# Patient Record
Sex: MALE | Race: BLACK OR AFRICAN AMERICAN | NOT HISPANIC OR LATINO | Employment: OTHER | URBAN - METROPOLITAN AREA
[De-identification: names, ages, dates, MRNs, and addresses within clinical notes are randomized per-mention and may not be internally consistent; named-entity substitution may affect disease eponyms.]

---

## 2018-05-22 ENCOUNTER — CLINICAL SUPPORT (OUTPATIENT)
Dept: SMOKING CESSATION | Facility: CLINIC | Age: 63
End: 2018-05-22
Payer: COMMERCIAL

## 2018-05-22 DIAGNOSIS — F17.200 NICOTINE DEPENDENCE: Primary | ICD-10-CM

## 2018-05-22 PROCEDURE — 99404 PREV MED CNSL INDIV APPRX 60: CPT | Mod: S$GLB,,, | Performed by: FAMILY MEDICINE

## 2018-05-22 RX ORDER — NICOTINE 7MG/24HR
1 PATCH, TRANSDERMAL 24 HOURS TRANSDERMAL DAILY
Qty: 28 PATCH | Refills: 0 | Status: SHIPPED | OUTPATIENT
Start: 2018-05-22 | End: 2018-05-28 | Stop reason: SDUPTHER

## 2018-05-22 NOTE — Clinical Note
Pt seen at intake today. He currently smokes 4 cigs/day, down from a pack a day. Discussed tobacco cessation medication of 7 mg nicotine patch QD. Pt started on rate reduction and wait time of 15 min prior to smoking. Exhaled carbon monoxide level was 9 (0-6 non-smoker). Will see pt back in office in 2 wks.

## 2018-05-28 DIAGNOSIS — F17.200 NICOTINE DEPENDENCE: ICD-10-CM

## 2018-05-28 RX ORDER — NICOTINE 7MG/24HR
1 PATCH, TRANSDERMAL 24 HOURS TRANSDERMAL DAILY
Qty: 28 PATCH | Refills: 0 | Status: SHIPPED | OUTPATIENT
Start: 2018-05-28 | End: 2018-06-05 | Stop reason: CLARIF

## 2018-05-28 NOTE — PROGRESS NOTES
Pt called regarding nicotine patches. Pharmacy states they never received prescription. Epic shows they did. Resent it today.

## 2018-06-05 ENCOUNTER — CLINICAL SUPPORT (OUTPATIENT)
Dept: SMOKING CESSATION | Facility: CLINIC | Age: 63
End: 2018-06-05
Payer: COMMERCIAL

## 2018-06-05 DIAGNOSIS — F17.200 NICOTINE DEPENDENCE: Primary | ICD-10-CM

## 2018-06-05 PROCEDURE — 99402 PREV MED CNSL INDIV APPRX 30: CPT | Mod: S$GLB,,, | Performed by: FAMILY MEDICINE

## 2018-06-05 RX ORDER — DM/P-EPHED/ACETAMINOPH/DOXYLAM 30-7.5/3
2 LIQUID (ML) ORAL
Qty: 81 LOZENGE | Refills: 0 | Status: SHIPPED | OUTPATIENT
Start: 2018-06-05 | End: 2018-07-05

## 2018-06-05 RX ORDER — NICOTINE 7MG/24HR
1 PATCH, TRANSDERMAL 24 HOURS TRANSDERMAL DAILY
Qty: 28 PATCH | Refills: 0 | Status: SHIPPED | OUTPATIENT
Start: 2018-06-05 | End: 2018-07-10 | Stop reason: SDUPTHER

## 2018-06-05 NOTE — Clinical Note
Pt seen in office today. He continues to smoke 4 cigs/day. Pt remains on tobacco cessation medication of 7 mg nicotine patch QD and 2 mg nicotine lozenge PRN (1-2 per hour in place of cigarettes). He has only been using cinnamon tooth picks. Pharmacy has patches and lozenges on back order. Cx'd and sent to another pharmacy. No adverse effects/mental changes noted at this time. Reviewed coping strategies/habitual behavior/relapse prevention with patient. Exhaled carbon monoxide level was 5 ppm per Smokerlyzer (0-6 non-smoker). Will see pt back in office in 1 wk.

## 2018-06-05 NOTE — PROGRESS NOTES
Individual Follow-Up Form    6/5/2018    Clinical Status of Patient: Outpatient    Length of Service: 30 minutes    Continuing Medication: yes  Patches or Nicotine Lozenges    Other Medications: none     Target Symptoms: Withdrawal and medication side effects. The following were rated moderate (3) to severe (4) on TCRS:  · Moderate (3): desire/crave tobaccp  · Severe (4): none    Comments:  Pt seen in office today. He continues to smoke 4 cigs/day. Pt remains on tobacco cessation medication of 7 mg nicotine patch QD and 2 mg nicotine lozenge PRN (1-2 per hour in place of cigarettes). He has only been using cinnamon tooth picks. Pharmacy has patches and lozenges on back order. Cx'd and sent to another pharmacy. No adverse effects/mental changes noted at this time. Reviewed coping strategies/habitual behavior/relapse prevention with patient. Exhaled carbon monoxide level was 5 ppm per Smokerlyzer (0-6 non-smoker). Will see pt back in office in 1 wk.     Diagnosis: F17.200    Next Visit: 1 week

## 2018-06-12 ENCOUNTER — CLINICAL SUPPORT (OUTPATIENT)
Dept: SMOKING CESSATION | Facility: CLINIC | Age: 63
End: 2018-06-12
Payer: COMMERCIAL

## 2018-06-12 DIAGNOSIS — F17.200 NICOTINE DEPENDENCE: ICD-10-CM

## 2018-06-12 PROCEDURE — 99402 PREV MED CNSL INDIV APPRX 30: CPT | Mod: S$GLB,,, | Performed by: FAMILY MEDICINE

## 2018-06-12 NOTE — Clinical Note
Pt seen in office today. He continues to smoke 3-4 cigs/day. Pt remains on tobacco cessation medication of 7 mg nicotine patch QD and 2 mg nicotine lozenge PRN (1-2 per hour in place of cigarettes). He has not been using lozenges due to disagreeable taste and hiccups. Explained that hiccups are due to too much nicotine. Advised to stop lozenges and use hard candy of his liking. No other adverse effects/mental changes noted at this time. Pt asked to attempt to make this his last pack. Reviewed coping strategies/habitual behavior/relapse prevention with patient. Exhaled carbon monoxide level was 5 ppm per Smokerlyzer (0-6 non-smoker). Will see pt back in office in 1 wk.

## 2018-06-12 NOTE — PROGRESS NOTES
Individual Follow-Up Form    6/12/2018    Clinical Status of Patient: Outpatient    Length of Service: 30 minutes    Continuing Medication: yes  Patches or Nicotine Lozenges    Other Medications: none     Target Symptoms: Withdrawal and medication side effects. The following were rated moderate (3) to severe (4) on TCRS:  · Moderate (3): desire/crave tobacco, hiccups  · Severe (4): none    Comments:  Pt seen in office today. He continues to smoke 3-4 cigs/day. Pt remains on tobacco cessation medication of 7 mg nicotine patch QD and 2 mg nicotine lozenge PRN (1-2 per hour in place of cigarettes). He has not been using lozenges due to disagreeable taste and hiccups. Explained that hiccups are due to too much nicotine. Advised to stop lozenges and use hard candy of his liking. No other adverse effects/mental changes noted at this time. Pt asked to attempt to make this his last pack. Reviewed coping strategies/habitual behavior/relapse prevention with patient. Exhaled carbon monoxide level was 5 ppm per Smokerlyzer (0-6 non-smoker). Will see pt back in office in 1 wk.     Diagnosis: F17.200    Next Visit: 1 week

## 2018-06-19 ENCOUNTER — CLINICAL SUPPORT (OUTPATIENT)
Dept: SMOKING CESSATION | Facility: CLINIC | Age: 63
End: 2018-06-19
Payer: COMMERCIAL

## 2018-06-19 DIAGNOSIS — F17.200 NICOTINE DEPENDENCE: ICD-10-CM

## 2018-06-19 PROCEDURE — 99402 PREV MED CNSL INDIV APPRX 30: CPT | Mod: S$GLB,,, | Performed by: FAMILY MEDICINE

## 2018-06-19 NOTE — PROGRESS NOTES
Individual Follow-Up Form    6/19/2018    Clinical Status of Patient: Outpatient    Length of Service: 30 minutes    Continuing Medication: yes  Patches or Nicotine Lozenges    Other Medications: none     Target Symptoms: Withdrawal and medication side effects. The following were rated moderate (3) to severe (4) on TCRS:  · Moderate (3): none  · Severe (4): none    Comments:  Pt seen in office today. He continues to smoke 3 cigs/day. Pt remains on tobacco cessation medication of 7 mg nicotine patch QD and 2 mg nicotine lozenge PRN (1-2 per hour in place of cigarettes). No adverse effects/mental changes noted at this time. Pt asked to make this his last pack and attempt a quit prior to next visit. Reviewed coping strategies/habitual behavior/relapse prevention with patient. Exhaled carbon monoxide level was 7 ppm per Smokerlyzer (0-6 non-smoker). Will see pt back in office in 1 wk.     Diagnosis: F17.200    Next Visit: 1 week

## 2018-06-19 NOTE — Clinical Note
Pt seen in office today. He continues to smoke 3 cigs/day. Pt remains on tobacco cessation medication of 7 mg nicotine patch QD and 2 mg nicotine lozenge PRN (1-2 per hour in place of cigarettes). No adverse effects/mental changes noted at this time. Pt asked to make this his last pack and attempt a quit prior to next visit. Reviewed coping strategies/habitual behavior/relapse prevention with patient. Exhaled carbon monoxide level was 7 ppm per Smokerlyzer (0-6 non-smoker). Will see pt back in office in 1 wk.

## 2018-06-26 ENCOUNTER — CLINICAL SUPPORT (OUTPATIENT)
Dept: SMOKING CESSATION | Facility: CLINIC | Age: 63
End: 2018-06-26
Payer: COMMERCIAL

## 2018-06-26 DIAGNOSIS — F17.200 NICOTINE DEPENDENCE: ICD-10-CM

## 2018-06-26 PROCEDURE — 99411 PREVENTIVE COUNSELING GROUP: CPT | Mod: S$GLB,,, | Performed by: FAMILY MEDICINE

## 2018-06-26 NOTE — PROGRESS NOTES
Individual Follow-Up Form    6/26/2018    Clinical Status of Patient: Outpatient    Length of Service: 30 minutes    Continuing Medication: yes  Patches or Nicotine Lozenges    Other Medications: none     Target Symptoms: Withdrawal and medication side effects. The following were rated moderate (3) to severe (4) on TCRS:  · Moderate (3): none  · Severe (4): none    Comments:  Pt seen in office today. He continues to smoke 2-3 cigs/day. Pt remains on tobacco cessation medication of 7 mg nicotine patch QD. No adverse effects/mental changes noted at this time. Pt asked to make this his last pack and attempt a quit. Reviewed coping strategies/habitual behavior/relapse prevention with patient. Exhaled carbon monoxide level was 5 ppm per Smokerlyzer (0-6 non-smoker). Will see pt back in office in 1 wk.     Diagnosis: F17.200    Next Visit: 1 week

## 2018-06-26 NOTE — Clinical Note
Pt seen in office today. He continues to smoke 2-3 cigs/day. Pt remains on tobacco cessation medication of 7 mg nicotine patch QD. No adverse effects/mental changes noted at this time. Pt asked to make this his last pack and attempt a quit. Reviewed coping strategies/habitual behavior/relapse prevention with patient. Exhaled carbon monoxide level was 5 ppm per Smokerlyzer (0-6 non-smoker). Will see pt back in office in 1 wk.

## 2018-07-03 ENCOUNTER — CLINICAL SUPPORT (OUTPATIENT)
Dept: SMOKING CESSATION | Facility: CLINIC | Age: 63
End: 2018-07-03
Payer: COMMERCIAL

## 2018-07-03 DIAGNOSIS — F17.200 NICOTINE DEPENDENCE: ICD-10-CM

## 2018-07-03 PROCEDURE — 99402 PREV MED CNSL INDIV APPRX 30: CPT | Mod: S$GLB,,, | Performed by: FAMILY MEDICINE

## 2018-07-03 NOTE — PROGRESS NOTES
Individual Follow-Up Form    7/3/2018    Clinical Status of Patient: Outpatient    Length of Service: 30 minutes    Continuing Medication: yes  Patches    Other Medications: none     Target Symptoms: Withdrawal and medication side effects. The following were rated moderate (3) to severe (4) on TCRS:  · Moderate (3): none  · Severe (4): none    Comments:  Pt seen in office today. He is tobacco free at this time. He did admit to a slip yesterday, but has remained tobacco free.  Pt remains on tobacco cessation medication of 7 mg nicotine patch QD. No adverse effects/mental changes noted at this time. Congratulated him on his success and encouraged him to keep up the great work.  Reviewed coping strategies/habitual behavior/relapse prevention with patient. Exhaled carbon monoxide level was 3 ppm per Smokerlyzer (0-6 non-smoker). Will see pt back in office in 1 wk.     Diagnosis: F17.200    Next Visit: 1 week

## 2018-07-03 NOTE — Clinical Note
Pt seen in office today. He is tobacco free at this time. He did admit to a slip yesterday, but has remained tobacco free.  Pt remains on tobacco cessation medication of 7 mg nicotine patch QD. No adverse effects/mental changes noted at this time. Congratulated him on his success and encouraged him to keep up the great work.  Reviewed coping strategies/habitual behavior/relapse prevention with patient. Exhaled carbon monoxide level was 3 ppm per Smokerlyzer (0-6 non-smoker). Will see pt back in office in 1 wk.

## 2018-07-10 ENCOUNTER — CLINICAL SUPPORT (OUTPATIENT)
Dept: SMOKING CESSATION | Facility: CLINIC | Age: 63
End: 2018-07-10
Payer: COMMERCIAL

## 2018-07-10 DIAGNOSIS — F17.200 NICOTINE DEPENDENCE: ICD-10-CM

## 2018-07-10 PROCEDURE — 99402 PREV MED CNSL INDIV APPRX 30: CPT | Mod: S$GLB,,, | Performed by: FAMILY MEDICINE

## 2018-07-10 RX ORDER — NICOTINE 7MG/24HR
1 PATCH, TRANSDERMAL 24 HOURS TRANSDERMAL DAILY
Qty: 28 PATCH | Refills: 0 | Status: SHIPPED | OUTPATIENT
Start: 2018-07-10 | End: 2018-08-09

## 2018-07-10 NOTE — Clinical Note
Pt seen in office today. He is tobacco free at this time, but he admits to a slip yesterday. Pt remains on tobacco cessation medication of 7 mg nicotine patch QD. No adverse effects/mental changes noted at this time. Congratulated him on his success and encouraged him to keep up the great work. Reviewed coping strategies/habitual behavior/relapse prevention with patient. Exhaled carbon monoxide level was 5 ppm per Smokerlyzer (0-6 non-smoker). Will see pt back in office PRN. He has completed all assigned visits.

## 2018-07-10 NOTE — PROGRESS NOTES
Individual Follow-Up Form    7/10/2018    Clinical Status of Patient: Outpatient    Length of Service: 30 minutes    Continuing Medication: yes  Patches    Other Medications: none     Target Symptoms: Withdrawal and medication side effects. The following were rated moderate (3) to severe (4) on TCRS:  · Moderate (3): none  · Severe (4): none    Comments:  Pt seen in office today. He is tobacco free at this time, but he admits to a slip yesterday. Pt remains on tobacco cessation medication of 7 mg nicotine patch QD. No adverse effects/mental changes noted at this time. Congratulated him on his success and encouraged him to keep up the great work. Reviewed coping strategies/habitual behavior/relapse prevention with patient. Exhaled carbon monoxide level was 5 ppm per Smokerlyzer (0-6 non-smoker). Will see pt back in office PRN. He has completed all assigned visits.     Diagnosis: F17.200    Next Visit: PRN

## 2018-08-03 ENCOUNTER — TELEPHONE (OUTPATIENT)
Dept: SMOKING CESSATION | Facility: CLINIC | Age: 63
End: 2018-08-03

## 2018-08-06 ENCOUNTER — CLINICAL SUPPORT (OUTPATIENT)
Dept: SMOKING CESSATION | Facility: CLINIC | Age: 63
End: 2018-08-06
Payer: COMMERCIAL

## 2018-08-06 DIAGNOSIS — F17.200 NICOTINE DEPENDENCE: Primary | ICD-10-CM

## 2018-08-06 PROCEDURE — 99407 BEHAV CHNG SMOKING > 10 MIN: CPT | Mod: S$GLB,,,

## 2018-08-06 NOTE — PROGRESS NOTES
Called pt to f/u on his 3 month smoking cessation quit status. Pt stated he remains tobacco free. Congratulated him and informed him of 6 and 12 month phone f/up's.

## 2018-11-28 ENCOUNTER — TELEPHONE (OUTPATIENT)
Dept: SMOKING CESSATION | Facility: CLINIC | Age: 63
End: 2018-11-28

## 2018-11-30 ENCOUNTER — CLINICAL SUPPORT (OUTPATIENT)
Dept: SMOKING CESSATION | Facility: CLINIC | Age: 63
End: 2018-11-30
Payer: COMMERCIAL

## 2018-11-30 DIAGNOSIS — F17.200 NICOTINE DEPENDENCE: Primary | ICD-10-CM

## 2018-11-30 PROCEDURE — 99407 BEHAV CHNG SMOKING > 10 MIN: CPT | Mod: S$GLB,,,

## 2019-05-28 ENCOUNTER — TELEPHONE (OUTPATIENT)
Dept: SMOKING CESSATION | Facility: CLINIC | Age: 64
End: 2019-05-28

## 2019-06-04 ENCOUNTER — TELEPHONE (OUTPATIENT)
Dept: SMOKING CESSATION | Facility: CLINIC | Age: 64
End: 2019-06-04

## 2019-06-25 ENCOUNTER — TELEPHONE (OUTPATIENT)
Dept: SMOKING CESSATION | Facility: CLINIC | Age: 64
End: 2019-06-25

## 2020-01-23 ENCOUNTER — OFFICE VISIT (OUTPATIENT)
Dept: PRIMARY CARE CLINIC | Facility: CLINIC | Age: 65
End: 2020-01-23
Payer: COMMERCIAL

## 2020-01-23 VITALS
BODY MASS INDEX: 33.7 KG/M2 | WEIGHT: 209.69 LBS | TEMPERATURE: 98 F | SYSTOLIC BLOOD PRESSURE: 122 MMHG | RESPIRATION RATE: 18 BRPM | HEART RATE: 89 BPM | HEIGHT: 66 IN | DIASTOLIC BLOOD PRESSURE: 84 MMHG | OXYGEN SATURATION: 95 %

## 2020-01-23 DIAGNOSIS — Z72.0 TOBACCO ABUSE: ICD-10-CM

## 2020-01-23 DIAGNOSIS — E78.5 HYPERLIPIDEMIA, UNSPECIFIED HYPERLIPIDEMIA TYPE: ICD-10-CM

## 2020-01-23 DIAGNOSIS — I10 ESSENTIAL HYPERTENSION: Primary | ICD-10-CM

## 2020-01-23 DIAGNOSIS — N18.30 CKD (CHRONIC KIDNEY DISEASE) STAGE 3, GFR 30-59 ML/MIN: ICD-10-CM

## 2020-01-23 DIAGNOSIS — Z87.01 HISTORY OF BACTERIAL PNEUMONIA: ICD-10-CM

## 2020-01-23 PROCEDURE — 99203 PR OFFICE/OUTPT VISIT, NEW, LEVL III, 30-44 MIN: ICD-10-PCS | Mod: S$GLB,,, | Performed by: FAMILY MEDICINE

## 2020-01-23 PROCEDURE — 3008F BODY MASS INDEX DOCD: CPT | Mod: CPTII,S$GLB,, | Performed by: FAMILY MEDICINE

## 2020-01-23 PROCEDURE — 99999 PR PBB SHADOW E&M-EST. PATIENT-LVL III: ICD-10-PCS | Mod: PBBFAC,,, | Performed by: FAMILY MEDICINE

## 2020-01-23 PROCEDURE — 3074F PR MOST RECENT SYSTOLIC BLOOD PRESSURE < 130 MM HG: ICD-10-PCS | Mod: CPTII,S$GLB,, | Performed by: FAMILY MEDICINE

## 2020-01-23 PROCEDURE — 3008F PR BODY MASS INDEX (BMI) DOCUMENTED: ICD-10-PCS | Mod: CPTII,S$GLB,, | Performed by: FAMILY MEDICINE

## 2020-01-23 PROCEDURE — 3079F DIAST BP 80-89 MM HG: CPT | Mod: CPTII,S$GLB,, | Performed by: FAMILY MEDICINE

## 2020-01-23 PROCEDURE — 3079F PR MOST RECENT DIASTOLIC BLOOD PRESSURE 80-89 MM HG: ICD-10-PCS | Mod: CPTII,S$GLB,, | Performed by: FAMILY MEDICINE

## 2020-01-23 PROCEDURE — 3074F SYST BP LT 130 MM HG: CPT | Mod: CPTII,S$GLB,, | Performed by: FAMILY MEDICINE

## 2020-01-23 PROCEDURE — 99203 OFFICE O/P NEW LOW 30 MIN: CPT | Mod: S$GLB,,, | Performed by: FAMILY MEDICINE

## 2020-01-23 PROCEDURE — 99999 PR PBB SHADOW E&M-EST. PATIENT-LVL III: CPT | Mod: PBBFAC,,, | Performed by: FAMILY MEDICINE

## 2020-01-23 RX ORDER — AMLODIPINE BESYLATE 10 MG/1
10 TABLET ORAL DAILY
COMMUNITY
Start: 2019-12-27 | End: 2020-06-02 | Stop reason: SDUPTHER

## 2020-01-23 RX ORDER — FUROSEMIDE 20 MG/1
20 TABLET ORAL DAILY
COMMUNITY
End: 2020-06-02 | Stop reason: SDUPTHER

## 2020-01-23 NOTE — PROGRESS NOTES
"  Subjective:       Patient ID: Gurwinder Hair is a 65 y.o. male.    Chief Complaint: Establish Care (Patient here to establish care with new PCP. Needs forms filled out for school. ); Insomnia (Patient waking up during the night.); Mass (Patient reports "lump" to left side of scalp. Nontender. ); and Shoulder Pain (Patient c/o bilateral shoulder andres, worse at night. )    HPI: 64 yo BM-- patient is a new patient needs new PCP has problems with some     History of hypertension patient has arm blood pressure cuff--BP at home 120/79 blood pressure here today 122/84     History of pneumonia 2 years ago--got congested few weeks ago--, no fever, +occasionally at night runny stuffy nose, no sore throat, +cough, +phlegm-green  No asthma TB-+smoking 1 pack per day.  Patient did participate in no smoking program but was ineffective afraid of Chantix due to side effects.    ROS:  Skin: no psoriasis, eczema, skin cancer  HEENT: No headache, ocular pain, blurred vision, diplopia, epistaxis, hoarseness change in voice, thyroid trouble  Lung:  + hx pneumonia, no asthma, Tb, wheezing, SOB, +smoking 1 pack per day 2 years ago after the ammonia was on oxygen for while  Heart: No chest pain, ankle edema, palpitations, MI, +merary murmur, +hypertension,+ hx  Hyperlipidemia---no stent bypass arrhythmia  Abdomen: No nausea, vomiting, diarrhea, constipation, ulcers, hepatitis, gallbladder disease, melena, hematochezia, hematemesis  : no UTI, renal disease, stones, prostate 2 yrs ago kidneys not functioning well after the pneumonia  MS: no fractures, O/A, lupus, rheumatoid, gout fracture left 3rd 4th and 5th digits, right wrist   Neuro: No dizziness, LOC, seizures   No diabetes, no anemia, no anxiety, no depression  --6 children, retired used work [employer] , Lives with wife and 2 children     Objective:   Physical Exam:  General: Well nourished, well developed, no acute distress  Skin: No lesions  HEENT: Eyes " PERRLA, EOM intact, nose patent, throat non-erythematous   NECK: Supple, no bruits, No JVD, no nodes  Lungs: Clear, no rales, rhonchi, wheezing  Heart: Regular rate and rhythm, no murmurs, gallops, or rubs  Abdomen: flat, bowel sounds positive, no tenderness, or organomegaly  MS: Range of motion and muscle strength intact  Neuro: Alert, CN intact, oriented X 3  Extremities: No cyanosis, clubbing, or edema         Assessment:       1. Essential hypertension    2. Hyperlipidemia, unspecified hyperlipidemia type    3. CKD (chronic kidney disease) stage 3, GFR 30-59 ml/min    4. Tobacco abuse    5. History of bacterial pneumonia        Plan:       Essential hypertension  -     CBC auto differential; Future; Expected date: 01/23/2020  -     Comprehensive metabolic panel; Future; Expected date: 01/23/2020  -     EKG 12-lead; Future  -     Fecal Immunochemical Test (iFOBT); Future; Expected date: 01/23/2020  -     X-Ray Chest PA And Lateral; Future; Expected date: 01/23/2020  -     POCT urine dipstick without microscope  -     T4, free; Future; Expected date: 01/23/2020  -     TSH; Future; Expected date: 01/23/2020    Hyperlipidemia, unspecified hyperlipidemia type  -     Lipid panel; Future; Expected date: 01/23/2020    CKD (chronic kidney disease) stage 3, GFR 30-59 ml/min    Tobacco abuse    History of bacterial pneumonia        Patient in mainly for form to be filled out for [employer] in [employer]--normal exam no contagious disease no physical or mental restrictions smokes 1 pack per day no alcohol hypertension but well controlled history of pneumonia 2 years ago no residual sequelae excellent candidate for [employer]  Hypertension-blood pressure 122/84 has arm blood pressure cuff checking blood pressure at home on Cozaar 100 hydrochlorothiazide 25 and Norvasc 10--if lab reveals patient has chronic renal insufficiency will have to be off of Cozaar  Hyperlipidemia--on Lipitor 20  Low-sodium  low-fat diet--exercise as tolerated--try to get ideal body weight  Tobacco abuse-1 pack per day-tried Nicoderm gum tried Chantix--told needs to stop smoking  Physical exam last in computer 2016-needs CBCs CMP lipids T4 TSH stool guaiac UA chest x-ray EKG is physical  Health maintenance hepatitis C lipids HIV tetanus  Six month supply of all medications

## 2020-02-05 ENCOUNTER — TELEPHONE (OUTPATIENT)
Dept: PRIMARY CARE CLINIC | Facility: CLINIC | Age: 65
End: 2020-02-05

## 2020-02-05 DIAGNOSIS — R91.1 PULMONARY NODULE: Primary | ICD-10-CM

## 2020-02-05 NOTE — TELEPHONE ENCOUNTER
----- Message from Sina Ball MD sent at 1/25/2020 10:32 PM CST -----  Chest Xray stable right lower lobe nodule, EKG inferior infarct age undetermined -- lab Cr 1.6 GFR 44-51 HDL 33 can increase with exercise See if pt has cardiologist if so needs see him with EKG unless ptnhad recent cardiac workup [place] chest Xray be sure nodule stable if not will need CT scan chest

## 2020-02-10 ENCOUNTER — TELEPHONE (OUTPATIENT)
Dept: PRIMARY CARE CLINIC | Facility: CLINIC | Age: 65
End: 2020-02-10

## 2020-02-10 DIAGNOSIS — R94.31 ABNORMAL EKG: Primary | ICD-10-CM

## 2020-02-10 NOTE — TELEPHONE ENCOUNTER
----- Message from Diana Alonso sent at 2/10/2020 12:38 PM CST -----  Contact: Patient  Type:  Patient Returning Call    Who Called:  Gurwinder, patient  Who Left Message for Patient:  Deepti  Does the patient know what this is regarding?:  Xray results  Best Call Back Number:  [phone]  Additional Information:  Missed your call, please call him back. Thanks.

## 2020-02-10 NOTE — TELEPHONE ENCOUNTER
Spoke with patient, lab, EKG, and CXR given. Patient with abnormal EKG. Referral to cardiology pended. Please advise.

## 2020-02-10 NOTE — TELEPHONE ENCOUNTER
Spoke with patient in another telephone encounter, results given. Referral for cardiology pended to PCP.

## 2020-02-19 NOTE — TELEPHONE ENCOUNTER
----- Message from Liya Amaradhaval sent at 2/19/2020 11:36 AM CST -----  Contact: Patient [phone]  Requesting an RX refill or new RX.  Is this a refill or new RX:  refill  RX name and strength: losartan (COZAAR) 100 MG tablet  Directions (copy/paste from chart):    Is this a 30 day or 90 day RX:    Local pharmacy or mail order pharmacy:  local  Pharmacy name and phone # (copy/paste from chart):  [id number] - [place], [place] - [address] AT [facility] [address] [phone] (Phone)  [phone] (Fax)     Comments:

## 2020-02-25 RX ORDER — LOSARTAN POTASSIUM 100 MG/1
100 TABLET ORAL EVERY MORNING
Qty: 30 TABLET | Refills: 5 | Status: SHIPPED | OUTPATIENT
Start: 2020-02-25 | End: 2020-06-02 | Stop reason: SDUPTHER

## 2020-06-02 ENCOUNTER — OFFICE VISIT (OUTPATIENT)
Dept: PRIMARY CARE CLINIC | Facility: CLINIC | Age: 65
End: 2020-06-02
Payer: COMMERCIAL

## 2020-06-02 VITALS
HEIGHT: 66 IN | DIASTOLIC BLOOD PRESSURE: 86 MMHG | OXYGEN SATURATION: 96 % | SYSTOLIC BLOOD PRESSURE: 122 MMHG | WEIGHT: 212.88 LBS | TEMPERATURE: 99 F | RESPIRATION RATE: 18 BRPM | HEART RATE: 89 BPM | BODY MASS INDEX: 34.21 KG/M2

## 2020-06-02 DIAGNOSIS — I10 ESSENTIAL HYPERTENSION: Primary | ICD-10-CM

## 2020-06-02 DIAGNOSIS — Z72.0 TOBACCO ABUSE: ICD-10-CM

## 2020-06-02 DIAGNOSIS — E78.5 HYPERLIPIDEMIA, UNSPECIFIED HYPERLIPIDEMIA TYPE: ICD-10-CM

## 2020-06-02 DIAGNOSIS — N18.30 CKD (CHRONIC KIDNEY DISEASE) STAGE 3, GFR 30-59 ML/MIN: ICD-10-CM

## 2020-06-02 PROCEDURE — 99999 PR PBB SHADOW E&M-EST. PATIENT-LVL III: ICD-10-PCS | Mod: PBBFAC,,, | Performed by: FAMILY MEDICINE

## 2020-06-02 PROCEDURE — 99999 PR PBB SHADOW E&M-EST. PATIENT-LVL III: CPT | Mod: PBBFAC,,, | Performed by: FAMILY MEDICINE

## 2020-06-02 PROCEDURE — 3079F DIAST BP 80-89 MM HG: CPT | Mod: CPTII,S$GLB,, | Performed by: FAMILY MEDICINE

## 2020-06-02 PROCEDURE — 99213 OFFICE O/P EST LOW 20 MIN: CPT | Mod: S$GLB,,, | Performed by: FAMILY MEDICINE

## 2020-06-02 PROCEDURE — 3079F PR MOST RECENT DIASTOLIC BLOOD PRESSURE 80-89 MM HG: ICD-10-PCS | Mod: CPTII,S$GLB,, | Performed by: FAMILY MEDICINE

## 2020-06-02 PROCEDURE — 3008F PR BODY MASS INDEX (BMI) DOCUMENTED: ICD-10-PCS | Mod: CPTII,S$GLB,, | Performed by: FAMILY MEDICINE

## 2020-06-02 PROCEDURE — 3008F BODY MASS INDEX DOCD: CPT | Mod: CPTII,S$GLB,, | Performed by: FAMILY MEDICINE

## 2020-06-02 PROCEDURE — 3074F PR MOST RECENT SYSTOLIC BLOOD PRESSURE < 130 MM HG: ICD-10-PCS | Mod: CPTII,S$GLB,, | Performed by: FAMILY MEDICINE

## 2020-06-02 PROCEDURE — 3074F SYST BP LT 130 MM HG: CPT | Mod: CPTII,S$GLB,, | Performed by: FAMILY MEDICINE

## 2020-06-02 PROCEDURE — 99213 PR OFFICE/OUTPT VISIT, EST, LEVL III, 20-29 MIN: ICD-10-PCS | Mod: S$GLB,,, | Performed by: FAMILY MEDICINE

## 2020-06-02 RX ORDER — AMLODIPINE BESYLATE 10 MG/1
10 TABLET ORAL DAILY
Qty: 90 TABLET | Refills: 1 | Status: SHIPPED | OUTPATIENT
Start: 2020-06-02 | End: 2020-12-10 | Stop reason: SDUPTHER

## 2020-06-02 RX ORDER — FUROSEMIDE 20 MG/1
20 TABLET ORAL DAILY
Qty: 90 TABLET | Refills: 1 | Status: SHIPPED | OUTPATIENT
Start: 2020-06-02 | End: 2020-12-10 | Stop reason: SDUPTHER

## 2020-06-02 RX ORDER — LOSARTAN POTASSIUM 100 MG/1
100 TABLET ORAL EVERY MORNING
Qty: 90 TABLET | Refills: 1 | Status: SHIPPED | OUTPATIENT
Start: 2020-06-02 | End: 2021-03-03 | Stop reason: SDUPTHER

## 2020-06-02 RX ORDER — ATORVASTATIN CALCIUM 20 MG/1
TABLET, FILM COATED ORAL
Qty: 90 TABLET | Refills: 1 | Status: SHIPPED | OUTPATIENT
Start: 2020-06-02 | End: 2021-02-12 | Stop reason: SDUPTHER

## 2020-06-02 NOTE — PROGRESS NOTES
Subjective:       Patient ID: Gurwinder Hair is a 65 y.o. male.    Chief Complaint: Medication Refill    HPI: 66 yo BM-- patient needs medication refills---eating well---+BM---ambulating well.  Patient has arm blood pressure cuff--doing well at home today 122/86    ROS:  Skin: no psoriasis, eczema, skin cancer  HEENT: No headache, ocular pain, blurred vision, diplopia, epistaxis, hoarseness change in voice, thyroid trouble  Lung:  + hx pneumonia, no asthma, Tb, wheezing, SOB, +smoking 1 pack per day 2 years ago after the ammonia was on oxygen for while  Heart: No chest pain, ankle edema, palpitations, MI, +merary murmur, +hypertension,+ hx  Hyperlipidemia---no stent bypass arrhythmia  Abdomen: No nausea, vomiting, diarrhea, constipation, ulcers, hepatitis, gallbladder disease, melena, hematochezia, hematemesis  : no UTI, renal disease, stones, prostate   MS: no fractures, O/A, lupus, rheumatoid, gout fracture left 3rd 4th and 5th digits, right wrist   Neuro: No dizziness, LOC, seizures   No diabetes, no anemia, no anxiety, no depression  --6 children, retired used work [employer] , Lives with wife and 2 children     Objective:   Physical Exam:  General: Well nourished, well developed, no acute distress  Skin: No lesions  HEENT: Eyes PERRLA, EOM intact, nose patent, throat non-erythematous   NECK: Supple, no bruits, No JVD, no nodes  Lungs: Clear, no rales, rhonchi, wheezing  Heart: Regular rate and rhythm, no murmurs, gallops, or rubs  Abdomen: flat, bowel sounds positive, no tenderness, or organomegaly  MS: Range of motion and muscle strength intact  Neuro: Alert, CN intact, oriented X 3  Extremities: No cyanosis, clubbing, or edema         Assessment:       1. Essential hypertension    2. Hyperlipidemia, unspecified hyperlipidemia type    3. CKD (chronic kidney disease) stage 3, GFR 30-59 ml/min    4. Tobacco abuse        Plan:       Essential hypertension    Hyperlipidemia, unspecified  hyperlipidemia type    CKD (chronic kidney disease) stage 3, GFR 30-59 ml/min    Tobacco abuse    Other orders  -     amLODIPine (NORVASC) 10 MG tablet; Take 1 tablet (10 mg total) by mouth once daily.  Dispense: 90 tablet; Refill: 1  -     atorvastatin (LIPITOR) 20 MG tablet; TAKE 1 TABLET (20 MG) BY ORAL ROUTE ONCE DAILY (PM)  Dispense: 90 tablet; Refill: 1  -     furosemide (LASIX) 20 MG tablet; Take 1 tablet (20 mg total) by mouth once daily.  Dispense: 90 tablet; Refill: 1  -     losartan (COZAAR) 100 MG tablet; Take 1 tablet (100 mg total) by mouth every morning.  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

## 2020-12-04 RX ORDER — FUROSEMIDE 20 MG/1
20 TABLET ORAL DAILY
Qty: 90 TABLET | Refills: 1 | Status: CANCELLED | OUTPATIENT
Start: 2020-12-04

## 2020-12-04 RX ORDER — AMLODIPINE BESYLATE 10 MG/1
10 TABLET ORAL DAILY
Qty: 90 TABLET | Refills: 1 | Status: CANCELLED | OUTPATIENT
Start: 2020-12-04

## 2020-12-04 NOTE — TELEPHONE ENCOUNTER
----- Message from Kinza Feliciano MA sent at 12/4/2020 11:03 AM CST -----  Contact: [phone]  Requesting an RX refill or new RX.  Is this a refill or new RX:   RX name and strength:furosemide (LASIX) 20 MG tablet  Is this a 30 day or 90 day RX: 30  Pharmacy name and phone # (copy/paste from chart):  [id number] [place] [facility] [place] - [address] AT [facility] [place] [place] [address] [phone] (Phone)  [phone] (Fax)  Comments:   Requesting an RX refill or new RX.  Is this a refill or new RX:   RX name and strength:amLODIPine (NORVASC) 10 MG tablet  Is this a 30 day or 90 day RX: 30  Pharmacy name and phone # (copy/paste from chart):  [facility] #[id number] - [place], [place] - [address] AT [facility] [place] [place] [address] [phone] (Phone)  [phone] (Fax)  Comments:

## 2020-12-10 NOTE — TELEPHONE ENCOUNTER
----- Message from Shira S Baljit sent at 12/10/2020  3:15 PM CST -----  Regarding: Pt self Mobile/Home [phone]  Requesting an RX refill or new RX.  Is this a refill or new RX: Refill  RX name and strength: furosemide (LASIX) 20 MG tablet  Is this a 30 day or 90 day RX: 90  Pharmacy name and phone # [facility] #[id number] - [place], [place]  [address] [facility] [address]  [phone] Fax# [phone]   Comments: Patient usually get a thirty day supply in these medications but he would like to get a ninety day supply please. Patient would like for you to give him a call when his script is filled please.           Requesting an RX refill or new RX.  Is this a refill or new RX: Refill  RX name and strength: amLODIPine (NORVASC) 10 MG tablet  Is this a 30 day or 90 day RX: 90  Pharmacy name and phone # [facility] #[id number] - [place], [place] - [address] [place] [facility] [address]  [phone] Fax# [phone]

## 2020-12-13 RX ORDER — FUROSEMIDE 20 MG/1
20 TABLET ORAL DAILY
Qty: 90 TABLET | Refills: 1 | Status: SHIPPED | OUTPATIENT
Start: 2020-12-13 | End: 2021-05-17 | Stop reason: SDUPTHER

## 2020-12-13 RX ORDER — AMLODIPINE BESYLATE 10 MG/1
10 TABLET ORAL DAILY
Qty: 90 TABLET | Refills: 1 | Status: SHIPPED | OUTPATIENT
Start: 2020-12-13 | End: 2021-05-17 | Stop reason: SDUPTHER

## 2020-12-14 NOTE — TELEPHONE ENCOUNTER
Call tell pt last seen may should have lab q 6 mo Last lab OK 3 mo refills but needs lab and be seen OK3 mo refills CBC CMP,lipid T4,TSh see me 2 mo later

## 2020-12-22 NOTE — TELEPHONE ENCOUNTER
Called patient notified that medications are refilled and needs labs before more refills states understanding

## 2021-02-12 ENCOUNTER — OFFICE VISIT (OUTPATIENT)
Dept: PRIMARY CARE CLINIC | Facility: CLINIC | Age: 66
End: 2021-02-12
Payer: MEDICARE

## 2021-02-12 VITALS
OXYGEN SATURATION: 98 % | BODY MASS INDEX: 34.29 KG/M2 | TEMPERATURE: 98 F | WEIGHT: 218.5 LBS | RESPIRATION RATE: 18 BRPM | DIASTOLIC BLOOD PRESSURE: 74 MMHG | SYSTOLIC BLOOD PRESSURE: 114 MMHG | HEART RATE: 84 BPM | HEIGHT: 67 IN

## 2021-02-12 DIAGNOSIS — Z11.4 ENCOUNTER FOR SCREENING FOR HIV: ICD-10-CM

## 2021-02-12 DIAGNOSIS — Z87.01 HISTORY OF BACTERIAL PNEUMONIA: ICD-10-CM

## 2021-02-12 DIAGNOSIS — I10 ESSENTIAL HYPERTENSION: Primary | ICD-10-CM

## 2021-02-12 DIAGNOSIS — Z72.0 TOBACCO ABUSE: ICD-10-CM

## 2021-02-12 DIAGNOSIS — E78.5 HYPERLIPIDEMIA, UNSPECIFIED HYPERLIPIDEMIA TYPE: ICD-10-CM

## 2021-02-12 DIAGNOSIS — Z11.59 NEED FOR HEPATITIS C SCREENING TEST: ICD-10-CM

## 2021-02-12 DIAGNOSIS — Z87.898 HISTORY OF SOLITARY PULMONARY NODULE: ICD-10-CM

## 2021-02-12 LAB
BILIRUB SERPL-MCNC: NORMAL MG/DL
BLOOD URINE, POC: NORMAL
CLARITY, POC UA: CLEAR
COLOR, POC UA: YELLOW
GLUCOSE UR QL STRIP: NORMAL
KETONES UR QL STRIP: NORMAL
LEUKOCYTE ESTERASE URINE, POC: NORMAL
NITRITE, POC UA: NORMAL
PH, POC UA: 5
PROTEIN, POC: NORMAL
SPECIFIC GRAVITY, POC UA: 1
UROBILINOGEN, POC UA: NORMAL

## 2021-02-12 PROCEDURE — 99499 UNLISTED E&M SERVICE: CPT | Mod: S$GLB,,, | Performed by: FAMILY MEDICINE

## 2021-02-12 PROCEDURE — 81002 URINALYSIS NONAUTO W/O SCOPE: CPT | Mod: S$GLB,,, | Performed by: FAMILY MEDICINE

## 2021-02-12 PROCEDURE — 99999 PR PBB SHADOW E&M-EST. PATIENT-LVL III: ICD-10-PCS | Mod: PBBFAC,,, | Performed by: FAMILY MEDICINE

## 2021-02-12 PROCEDURE — 99499 RISK ADDL DX/OHS AUDIT: ICD-10-PCS | Mod: S$GLB,,, | Performed by: FAMILY MEDICINE

## 2021-02-12 PROCEDURE — 1126F PR PAIN SEVERITY QUANTIFIED, NO PAIN PRESENT: ICD-10-PCS | Mod: S$GLB,,, | Performed by: FAMILY MEDICINE

## 2021-02-12 PROCEDURE — 1126F AMNT PAIN NOTED NONE PRSNT: CPT | Mod: S$GLB,,, | Performed by: FAMILY MEDICINE

## 2021-02-12 PROCEDURE — 81002 POCT URINE DIPSTICK WITHOUT MICROSCOPE: ICD-10-PCS | Mod: S$GLB,,, | Performed by: FAMILY MEDICINE

## 2021-02-12 PROCEDURE — 3008F PR BODY MASS INDEX (BMI) DOCUMENTED: ICD-10-PCS | Mod: CPTII,S$GLB,, | Performed by: FAMILY MEDICINE

## 2021-02-12 PROCEDURE — 99999 PR PBB SHADOW E&M-EST. PATIENT-LVL III: CPT | Mod: PBBFAC,,, | Performed by: FAMILY MEDICINE

## 2021-02-12 PROCEDURE — 3008F BODY MASS INDEX DOCD: CPT | Mod: CPTII,S$GLB,, | Performed by: FAMILY MEDICINE

## 2021-02-12 PROCEDURE — 99397 PR PREVENTIVE VISIT,EST,65 & OVER: ICD-10-PCS | Mod: 25,S$GLB,, | Performed by: FAMILY MEDICINE

## 2021-02-12 PROCEDURE — 99397 PER PM REEVAL EST PAT 65+ YR: CPT | Mod: 25,S$GLB,, | Performed by: FAMILY MEDICINE

## 2021-02-12 PROCEDURE — 3078F PR MOST RECENT DIASTOLIC BLOOD PRESSURE < 80 MM HG: ICD-10-PCS | Mod: CPTII,S$GLB,, | Performed by: FAMILY MEDICINE

## 2021-02-12 PROCEDURE — 3074F SYST BP LT 130 MM HG: CPT | Mod: CPTII,S$GLB,, | Performed by: FAMILY MEDICINE

## 2021-02-12 PROCEDURE — 3074F PR MOST RECENT SYSTOLIC BLOOD PRESSURE < 130 MM HG: ICD-10-PCS | Mod: CPTII,S$GLB,, | Performed by: FAMILY MEDICINE

## 2021-02-12 PROCEDURE — 3078F DIAST BP <80 MM HG: CPT | Mod: CPTII,S$GLB,, | Performed by: FAMILY MEDICINE

## 2021-02-12 RX ORDER — ATORVASTATIN CALCIUM 20 MG/1
TABLET, FILM COATED ORAL
Qty: 90 TABLET | Refills: 1 | Status: SHIPPED | OUTPATIENT
Start: 2021-02-12 | End: 2021-02-12 | Stop reason: SDUPTHER

## 2021-02-15 ENCOUNTER — TELEPHONE (OUTPATIENT)
Dept: PRIMARY CARE CLINIC | Facility: CLINIC | Age: 66
End: 2021-02-15

## 2021-02-16 RX ORDER — FENOFIBRIC ACID 45 MG/1
45 CAPSULE, DELAYED RELEASE ORAL DAILY
Qty: 90 CAPSULE | Refills: 3 | Status: SHIPPED | OUTPATIENT
Start: 2021-02-16 | End: 2021-08-12 | Stop reason: SDUPTHER

## 2021-02-19 ENCOUNTER — TELEPHONE (OUTPATIENT)
Dept: PRIMARY CARE CLINIC | Facility: CLINIC | Age: 66
End: 2021-02-19

## 2021-02-19 DIAGNOSIS — I71.40 ABDOMINAL AORTIC ANEURYSM (AAA) WITHOUT RUPTURE: Primary | ICD-10-CM

## 2021-02-22 ENCOUNTER — TELEPHONE (OUTPATIENT)
Dept: VASCULAR SURGERY | Facility: CLINIC | Age: 66
End: 2021-02-22

## 2021-02-22 DIAGNOSIS — I71.40 AAA (ABDOMINAL AORTIC ANEURYSM) WITHOUT RUPTURE: Primary | ICD-10-CM

## 2021-02-24 ENCOUNTER — INITIAL CONSULT (OUTPATIENT)
Dept: VASCULAR SURGERY | Facility: CLINIC | Age: 66
End: 2021-02-24
Payer: MEDICARE

## 2021-02-24 ENCOUNTER — HOSPITAL ENCOUNTER (OUTPATIENT)
Dept: VASCULAR SURGERY | Facility: CLINIC | Age: 66
Discharge: HOME | End: 2021-02-24
Attending: SURGERY
Payer: MEDICARE

## 2021-02-24 VITALS
HEART RATE: 77 BPM | BODY MASS INDEX: 33.66 KG/M2 | HEIGHT: 66 IN | SYSTOLIC BLOOD PRESSURE: 125 MMHG | WEIGHT: 209.44 LBS | DIASTOLIC BLOOD PRESSURE: 85 MMHG | TEMPERATURE: 99 F

## 2021-02-24 DIAGNOSIS — Z72.0 TOBACCO ABUSE: ICD-10-CM

## 2021-02-24 DIAGNOSIS — I71.40 ABDOMINAL AORTIC ANEURYSM (AAA) WITHOUT RUPTURE: Primary | ICD-10-CM

## 2021-02-24 DIAGNOSIS — I71.40 ABDOMINAL AORTIC ANEURYSM (AAA) WITHOUT RUPTURE: ICD-10-CM

## 2021-02-24 DIAGNOSIS — I71.40 AAA (ABDOMINAL AORTIC ANEURYSM) WITHOUT RUPTURE: ICD-10-CM

## 2021-02-24 PROCEDURE — 99407 BEHAV CHNG SMOKING > 10 MIN: CPT | Mod: S$GLB,,, | Performed by: SURGERY

## 2021-02-24 PROCEDURE — 99204 PR OFFICE/OUTPT VISIT, NEW, LEVL IV, 45-59 MIN: ICD-10-PCS | Mod: 25,S$GLB,, | Performed by: SURGERY

## 2021-02-24 PROCEDURE — 99204 OFFICE O/P NEW MOD 45 MIN: CPT | Mod: 25,S$GLB,, | Performed by: SURGERY

## 2021-02-24 PROCEDURE — 99999 PR PBB SHADOW E&M-EST. PATIENT-LVL III: CPT | Mod: PBBFAC,,, | Performed by: SURGERY

## 2021-02-24 PROCEDURE — 93925 PR DUPLEX LO EXTREM ART BILAT: ICD-10-PCS | Mod: 52,S$GLB,, | Performed by: SURGERY

## 2021-02-24 PROCEDURE — 93978 PR DUPLEX LARGE VESSEL(S),COMPLETE: ICD-10-PCS | Mod: S$GLB,,, | Performed by: SURGERY

## 2021-02-24 PROCEDURE — 93978 VASCULAR STUDY: CPT | Mod: S$GLB,,, | Performed by: SURGERY

## 2021-02-24 PROCEDURE — 99999 PR PBB SHADOW E&M-EST. PATIENT-LVL III: ICD-10-PCS | Mod: PBBFAC,,, | Performed by: SURGERY

## 2021-02-24 PROCEDURE — 3079F PR MOST RECENT DIASTOLIC BLOOD PRESSURE 80-89 MM HG: ICD-10-PCS | Mod: CPTII,S$GLB,, | Performed by: SURGERY

## 2021-02-24 PROCEDURE — 3074F PR MOST RECENT SYSTOLIC BLOOD PRESSURE < 130 MM HG: ICD-10-PCS | Mod: CPTII,S$GLB,, | Performed by: SURGERY

## 2021-02-24 PROCEDURE — 3079F DIAST BP 80-89 MM HG: CPT | Mod: CPTII,S$GLB,, | Performed by: SURGERY

## 2021-02-24 PROCEDURE — 3074F SYST BP LT 130 MM HG: CPT | Mod: CPTII,S$GLB,, | Performed by: SURGERY

## 2021-02-24 PROCEDURE — 93925 LOWER EXTREMITY STUDY: CPT | Mod: 52,S$GLB,, | Performed by: SURGERY

## 2021-02-24 PROCEDURE — 99407 PR TOBACCO USE CESSATION INTENSIVE >10 MINUTES: ICD-10-PCS | Mod: S$GLB,,, | Performed by: SURGERY

## 2021-03-03 DIAGNOSIS — I10 ESSENTIAL HYPERTENSION: Primary | ICD-10-CM

## 2021-03-03 RX ORDER — LOSARTAN POTASSIUM 100 MG/1
100 TABLET ORAL EVERY MORNING
Qty: 90 TABLET | Refills: 1 | Status: SHIPPED | OUTPATIENT
Start: 2021-03-03 | End: 2021-05-07 | Stop reason: SDUPTHER

## 2021-03-05 ENCOUNTER — IMMUNIZATION (OUTPATIENT)
Dept: PRIMARY CARE CLINIC | Facility: CLINIC | Age: 66
End: 2021-03-05
Payer: MEDICARE

## 2021-03-05 DIAGNOSIS — Z23 NEED FOR VACCINATION: Primary | ICD-10-CM

## 2021-03-05 PROCEDURE — 91303 PR SARSCOV2 VAC AD26 .5ML IM: ICD-10-PCS | Mod: S$GLB,,, | Performed by: INTERNAL MEDICINE

## 2021-03-05 PROCEDURE — 91303 PR SARSCOV2 VAC AD26 .5ML IM: CPT | Mod: S$GLB,,, | Performed by: INTERNAL MEDICINE

## 2021-03-05 PROCEDURE — 0031A PR IMMUNIZ ADMIN, SARS-COV-2 COVID-19 VACC, 5X10VP/0.5ML: CPT | Mod: CV19,S$GLB,, | Performed by: INTERNAL MEDICINE

## 2021-03-05 PROCEDURE — 0031A PR IMMUNIZ ADMIN, SARS-COV-2 COVID-19 VACC, 5X10VP/0.5ML: ICD-10-PCS | Mod: CV19,S$GLB,, | Performed by: INTERNAL MEDICINE

## 2021-03-07 LAB — NONINV COLON CA DNA+OCC BLD SCRN STL QL: NEGATIVE

## 2021-05-18 RX ORDER — FUROSEMIDE 20 MG/1
20 TABLET ORAL DAILY
Qty: 90 TABLET | Refills: 1 | Status: SHIPPED | OUTPATIENT
Start: 2021-05-18 | End: 2021-08-12 | Stop reason: SDUPTHER

## 2021-05-18 RX ORDER — AMLODIPINE BESYLATE 10 MG/1
10 TABLET ORAL DAILY
Qty: 90 TABLET | Refills: 1 | Status: SHIPPED | OUTPATIENT
Start: 2021-05-18 | End: 2021-08-12 | Stop reason: SDUPTHER

## 2021-06-16 ENCOUNTER — TELEPHONE (OUTPATIENT)
Dept: PRIMARY CARE CLINIC | Facility: CLINIC | Age: 66
End: 2021-06-16

## 2021-08-12 ENCOUNTER — OFFICE VISIT (OUTPATIENT)
Dept: PRIMARY CARE CLINIC | Facility: CLINIC | Age: 66
End: 2021-08-12
Payer: MEDICARE

## 2021-08-12 VITALS
HEART RATE: 81 BPM | SYSTOLIC BLOOD PRESSURE: 102 MMHG | WEIGHT: 207.69 LBS | BODY MASS INDEX: 33.38 KG/M2 | DIASTOLIC BLOOD PRESSURE: 66 MMHG | HEIGHT: 66 IN | RESPIRATION RATE: 16 BRPM | OXYGEN SATURATION: 95 %

## 2021-08-12 DIAGNOSIS — I71.40 ABDOMINAL AORTIC ANEURYSM (AAA) WITHOUT RUPTURE: ICD-10-CM

## 2021-08-12 DIAGNOSIS — Z87.898 HISTORY OF SOLITARY PULMONARY NODULE: ICD-10-CM

## 2021-08-12 DIAGNOSIS — Z72.0 TOBACCO ABUSE: ICD-10-CM

## 2021-08-12 DIAGNOSIS — E78.5 HYPERLIPIDEMIA, UNSPECIFIED HYPERLIPIDEMIA TYPE: ICD-10-CM

## 2021-08-12 DIAGNOSIS — Z87.01 HISTORY OF BACTERIAL PNEUMONIA: ICD-10-CM

## 2021-08-12 DIAGNOSIS — Z23 NEED FOR VACCINATION: Primary | ICD-10-CM

## 2021-08-12 DIAGNOSIS — I10 ESSENTIAL HYPERTENSION: ICD-10-CM

## 2021-08-12 PROCEDURE — 1159F PR MEDICATION LIST DOCUMENTED IN MEDICAL RECORD: ICD-10-PCS | Mod: CPTII,S$GLB,, | Performed by: FAMILY MEDICINE

## 2021-08-12 PROCEDURE — 99214 OFFICE O/P EST MOD 30 MIN: CPT | Mod: 25,S$GLB,, | Performed by: FAMILY MEDICINE

## 2021-08-12 PROCEDURE — 3074F SYST BP LT 130 MM HG: CPT | Mod: CPTII,S$GLB,, | Performed by: FAMILY MEDICINE

## 2021-08-12 PROCEDURE — G0009 ADMIN PNEUMOCOCCAL VACCINE: HCPCS | Mod: S$GLB,,, | Performed by: FAMILY MEDICINE

## 2021-08-12 PROCEDURE — 3074F PR MOST RECENT SYSTOLIC BLOOD PRESSURE < 130 MM HG: ICD-10-PCS | Mod: CPTII,S$GLB,, | Performed by: FAMILY MEDICINE

## 2021-08-12 PROCEDURE — 1101F PR PT FALLS ASSESS DOC 0-1 FALLS W/OUT INJ PAST YR: ICD-10-PCS | Mod: CPTII,S$GLB,, | Performed by: FAMILY MEDICINE

## 2021-08-12 PROCEDURE — 90670 PNEUMOCOCCAL CONJUGATE VACCINE 13-VALENT LESS THAN 5YO & GREATER THAN: ICD-10-PCS | Mod: S$GLB,,, | Performed by: FAMILY MEDICINE

## 2021-08-12 PROCEDURE — 99999 PR PBB SHADOW E&M-EST. PATIENT-LVL III: CPT | Mod: PBBFAC,,, | Performed by: FAMILY MEDICINE

## 2021-08-12 PROCEDURE — 1101F PT FALLS ASSESS-DOCD LE1/YR: CPT | Mod: CPTII,S$GLB,, | Performed by: FAMILY MEDICINE

## 2021-08-12 PROCEDURE — G0009 PNEUMOCOCCAL CONJUGATE VACCINE 13-VALENT LESS THAN 5YO & GREATER THAN: ICD-10-PCS | Mod: S$GLB,,, | Performed by: FAMILY MEDICINE

## 2021-08-12 PROCEDURE — 90714 TD VACCINE GREATER THAN OR EQUAL TO 7YO PRESERVATIVE FREE IM: ICD-10-PCS | Mod: S$GLB,,, | Performed by: FAMILY MEDICINE

## 2021-08-12 PROCEDURE — 90714 TD VACC NO PRESV 7 YRS+ IM: CPT | Mod: S$GLB,,, | Performed by: FAMILY MEDICINE

## 2021-08-12 PROCEDURE — 90472 TD VACCINE GREATER THAN OR EQUAL TO 7YO PRESERVATIVE FREE IM: ICD-10-PCS | Mod: S$GLB,,, | Performed by: FAMILY MEDICINE

## 2021-08-12 PROCEDURE — 99499 UNLISTED E&M SERVICE: CPT | Mod: S$GLB,,, | Performed by: FAMILY MEDICINE

## 2021-08-12 PROCEDURE — 99499 RISK ADDL DX/OHS AUDIT: ICD-10-PCS | Mod: S$GLB,,, | Performed by: FAMILY MEDICINE

## 2021-08-12 PROCEDURE — 99999 PR PBB SHADOW E&M-EST. PATIENT-LVL III: ICD-10-PCS | Mod: PBBFAC,,, | Performed by: FAMILY MEDICINE

## 2021-08-12 PROCEDURE — 3078F DIAST BP <80 MM HG: CPT | Mod: CPTII,S$GLB,, | Performed by: FAMILY MEDICINE

## 2021-08-12 PROCEDURE — 3008F PR BODY MASS INDEX (BMI) DOCUMENTED: ICD-10-PCS | Mod: CPTII,S$GLB,, | Performed by: FAMILY MEDICINE

## 2021-08-12 PROCEDURE — 3288F FALL RISK ASSESSMENT DOCD: CPT | Mod: CPTII,S$GLB,, | Performed by: FAMILY MEDICINE

## 2021-08-12 PROCEDURE — 99214 PR OFFICE/OUTPT VISIT, EST, LEVL IV, 30-39 MIN: ICD-10-PCS | Mod: 25,S$GLB,, | Performed by: FAMILY MEDICINE

## 2021-08-12 PROCEDURE — 3078F PR MOST RECENT DIASTOLIC BLOOD PRESSURE < 80 MM HG: ICD-10-PCS | Mod: CPTII,S$GLB,, | Performed by: FAMILY MEDICINE

## 2021-08-12 PROCEDURE — 1159F MED LIST DOCD IN RCRD: CPT | Mod: CPTII,S$GLB,, | Performed by: FAMILY MEDICINE

## 2021-08-12 PROCEDURE — 90670 PCV13 VACCINE IM: CPT | Mod: S$GLB,,, | Performed by: FAMILY MEDICINE

## 2021-08-12 PROCEDURE — 90472 IMMUNIZATION ADMIN EACH ADD: CPT | Mod: S$GLB,,, | Performed by: FAMILY MEDICINE

## 2021-08-12 PROCEDURE — 1126F AMNT PAIN NOTED NONE PRSNT: CPT | Mod: CPTII,S$GLB,, | Performed by: FAMILY MEDICINE

## 2021-08-12 PROCEDURE — 3288F PR FALLS RISK ASSESSMENT DOCUMENTED: ICD-10-PCS | Mod: CPTII,S$GLB,, | Performed by: FAMILY MEDICINE

## 2021-08-12 PROCEDURE — 3008F BODY MASS INDEX DOCD: CPT | Mod: CPTII,S$GLB,, | Performed by: FAMILY MEDICINE

## 2021-08-12 PROCEDURE — 1126F PR PAIN SEVERITY QUANTIFIED, NO PAIN PRESENT: ICD-10-PCS | Mod: CPTII,S$GLB,, | Performed by: FAMILY MEDICINE

## 2021-08-12 RX ORDER — ATORVASTATIN CALCIUM 20 MG/1
TABLET, FILM COATED ORAL
Qty: 90 TABLET | Refills: 1 | Status: SHIPPED | OUTPATIENT
Start: 2021-08-12 | End: 2021-12-27 | Stop reason: SDUPTHER

## 2021-08-12 RX ORDER — LOSARTAN POTASSIUM 50 MG/1
50 TABLET ORAL DAILY
Qty: 90 TABLET | Refills: 3 | Status: SHIPPED | OUTPATIENT
Start: 2021-08-12 | End: 2021-12-27

## 2021-08-12 RX ORDER — LOSARTAN POTASSIUM 100 MG/1
100 TABLET ORAL EVERY MORNING
Qty: 90 TABLET | Refills: 1 | Status: CANCELLED | OUTPATIENT
Start: 2021-08-12

## 2021-08-12 RX ORDER — AMLODIPINE BESYLATE 10 MG/1
10 TABLET ORAL DAILY
Qty: 90 TABLET | Refills: 1 | Status: SHIPPED | OUTPATIENT
Start: 2021-08-12 | End: 2021-09-17 | Stop reason: SDUPTHER

## 2021-08-12 RX ORDER — FUROSEMIDE 20 MG/1
20 TABLET ORAL DAILY
Qty: 90 TABLET | Refills: 1 | Status: SHIPPED | OUTPATIENT
Start: 2021-08-12 | End: 2021-09-17 | Stop reason: SDUPTHER

## 2021-08-12 RX ORDER — FENOFIBRIC ACID 45 MG/1
45 CAPSULE, DELAYED RELEASE ORAL DAILY
Qty: 90 CAPSULE | Refills: 3 | Status: SHIPPED | OUTPATIENT
Start: 2021-08-12 | End: 2022-02-10 | Stop reason: SDUPTHER

## 2021-09-17 RX ORDER — AMLODIPINE BESYLATE 10 MG/1
10 TABLET ORAL DAILY
Qty: 90 TABLET | Refills: 1 | Status: SHIPPED | OUTPATIENT
Start: 2021-09-17 | End: 2021-12-04

## 2021-09-17 RX ORDER — FUROSEMIDE 20 MG/1
20 TABLET ORAL DAILY
Qty: 90 TABLET | Refills: 1 | Status: SHIPPED | OUTPATIENT
Start: 2021-09-17 | End: 2021-12-04

## 2021-11-11 ENCOUNTER — IMMUNIZATION (OUTPATIENT)
Dept: PRIMARY CARE CLINIC | Facility: CLINIC | Age: 66
End: 2021-11-11
Payer: MEDICARE

## 2021-11-11 DIAGNOSIS — Z23 NEED FOR VACCINATION: Primary | ICD-10-CM

## 2021-11-11 PROCEDURE — 0064A COVID-19, MRNA, LNP-S, PF, 100 MCG/0.25 ML DOSE VACCINE (MODERNA BOOSTER): CPT | Mod: PBBFAC | Performed by: EMERGENCY MEDICINE

## 2021-12-19 NOTE — PROGRESS NOTES
Called pt to f/u on his 6 month smoking cessation quit status. Pt stated he remains tobacco free. Congratulated pt on his hard work and informed him of future benefits available, phone follow ups, and contact information. Will f/u in 6 months for 1 yr f/u.  
[employer] dose 1 and 2

## 2021-12-27 DIAGNOSIS — I10 ESSENTIAL HYPERTENSION: ICD-10-CM

## 2021-12-27 RX ORDER — LOSARTAN POTASSIUM 100 MG/1
TABLET ORAL
Qty: 90 TABLET | Refills: 1 | Status: SHIPPED | OUTPATIENT
Start: 2021-12-27 | End: 2022-02-10

## 2021-12-27 RX ORDER — AMLODIPINE BESYLATE 10 MG/1
10 TABLET ORAL DAILY
Qty: 90 TABLET | Refills: 1 | Status: SHIPPED | OUTPATIENT
Start: 2021-12-27 | End: 2022-02-10 | Stop reason: SDUPTHER

## 2021-12-27 RX ORDER — ATORVASTATIN CALCIUM 20 MG/1
TABLET, FILM COATED ORAL
Qty: 90 TABLET | Refills: 1 | Status: SHIPPED | OUTPATIENT
Start: 2021-12-27 | End: 2022-02-10 | Stop reason: SDUPTHER

## 2021-12-27 RX ORDER — FUROSEMIDE 20 MG/1
20 TABLET ORAL DAILY
Qty: 90 TABLET | Refills: 1 | Status: SHIPPED | OUTPATIENT
Start: 2021-12-27 | End: 2022-02-10 | Stop reason: SDUPTHER

## 2022-02-10 ENCOUNTER — OFFICE VISIT (OUTPATIENT)
Dept: PRIMARY CARE CLINIC | Facility: CLINIC | Age: 67
End: 2022-02-10
Payer: MEDICARE

## 2022-02-10 VITALS
OXYGEN SATURATION: 97 % | RESPIRATION RATE: 18 BRPM | WEIGHT: 210.63 LBS | SYSTOLIC BLOOD PRESSURE: 124 MMHG | BODY MASS INDEX: 33.85 KG/M2 | HEIGHT: 66 IN | DIASTOLIC BLOOD PRESSURE: 80 MMHG | HEART RATE: 90 BPM

## 2022-02-10 DIAGNOSIS — Z23 NEED FOR VACCINATION: Primary | ICD-10-CM

## 2022-02-10 DIAGNOSIS — I10 ESSENTIAL HYPERTENSION: ICD-10-CM

## 2022-02-10 DIAGNOSIS — Z87.898 HISTORY OF SOLITARY PULMONARY NODULE: ICD-10-CM

## 2022-02-10 DIAGNOSIS — Z72.0 TOBACCO ABUSE: ICD-10-CM

## 2022-02-10 DIAGNOSIS — I71.40 ABDOMINAL AORTIC ANEURYSM (AAA) WITHOUT RUPTURE: ICD-10-CM

## 2022-02-10 DIAGNOSIS — J44.9 COPD, SEVERITY TO BE DETERMINED: ICD-10-CM

## 2022-02-10 PROCEDURE — 90732 PPSV23 VACC 2 YRS+ SUBQ/IM: CPT | Mod: S$GLB,,, | Performed by: FAMILY MEDICINE

## 2022-02-10 PROCEDURE — 1159F PR MEDICATION LIST DOCUMENTED IN MEDICAL RECORD: ICD-10-PCS | Mod: HCNC,CPTII,S$GLB, | Performed by: FAMILY MEDICINE

## 2022-02-10 PROCEDURE — 4010F PR ACE/ARB THEARPY RXD/TAKEN: ICD-10-PCS | Mod: HCNC,CPTII,S$GLB, | Performed by: FAMILY MEDICINE

## 2022-02-10 PROCEDURE — 1126F AMNT PAIN NOTED NONE PRSNT: CPT | Mod: HCNC,CPTII,S$GLB, | Performed by: FAMILY MEDICINE

## 2022-02-10 PROCEDURE — 1159F MED LIST DOCD IN RCRD: CPT | Mod: HCNC,CPTII,S$GLB, | Performed by: FAMILY MEDICINE

## 2022-02-10 PROCEDURE — 3079F PR MOST RECENT DIASTOLIC BLOOD PRESSURE 80-89 MM HG: ICD-10-PCS | Mod: HCNC,CPTII,S$GLB, | Performed by: FAMILY MEDICINE

## 2022-02-10 PROCEDURE — 99214 PR OFFICE/OUTPT VISIT, EST, LEVL IV, 30-39 MIN: ICD-10-PCS | Mod: S$GLB,,, | Performed by: FAMILY MEDICINE

## 2022-02-10 PROCEDURE — 90732 PNEUMOCOCCAL POLYSACCHARIDE VACCINE 23-VALENT =>2YO SQ IM: ICD-10-PCS | Mod: S$GLB,,, | Performed by: FAMILY MEDICINE

## 2022-02-10 PROCEDURE — 3288F FALL RISK ASSESSMENT DOCD: CPT | Mod: HCNC,CPTII,S$GLB, | Performed by: FAMILY MEDICINE

## 2022-02-10 PROCEDURE — 1101F PT FALLS ASSESS-DOCD LE1/YR: CPT | Mod: HCNC,CPTII,S$GLB, | Performed by: FAMILY MEDICINE

## 2022-02-10 PROCEDURE — 99999 PR PBB SHADOW E&M-EST. PATIENT-LVL IV: CPT | Mod: PBBFAC,,, | Performed by: FAMILY MEDICINE

## 2022-02-10 PROCEDURE — 3074F PR MOST RECENT SYSTOLIC BLOOD PRESSURE < 130 MM HG: ICD-10-PCS | Mod: HCNC,CPTII,S$GLB, | Performed by: FAMILY MEDICINE

## 2022-02-10 PROCEDURE — 1126F PR PAIN SEVERITY QUANTIFIED, NO PAIN PRESENT: ICD-10-PCS | Mod: HCNC,CPTII,S$GLB, | Performed by: FAMILY MEDICINE

## 2022-02-10 PROCEDURE — 3079F DIAST BP 80-89 MM HG: CPT | Mod: HCNC,CPTII,S$GLB, | Performed by: FAMILY MEDICINE

## 2022-02-10 PROCEDURE — G0009 PNEUMOCOCCAL POLYSACCHARIDE VACCINE 23-VALENT =>2YO SQ IM: ICD-10-PCS | Mod: S$GLB,,, | Performed by: FAMILY MEDICINE

## 2022-02-10 PROCEDURE — 99214 OFFICE O/P EST MOD 30 MIN: CPT | Mod: S$GLB,,, | Performed by: FAMILY MEDICINE

## 2022-02-10 PROCEDURE — 99999 PR PBB SHADOW E&M-EST. PATIENT-LVL IV: ICD-10-PCS | Mod: PBBFAC,,, | Performed by: FAMILY MEDICINE

## 2022-02-10 PROCEDURE — 3288F PR FALLS RISK ASSESSMENT DOCUMENTED: ICD-10-PCS | Mod: HCNC,CPTII,S$GLB, | Performed by: FAMILY MEDICINE

## 2022-02-10 PROCEDURE — 3008F BODY MASS INDEX DOCD: CPT | Mod: HCNC,CPTII,S$GLB, | Performed by: FAMILY MEDICINE

## 2022-02-10 PROCEDURE — G0009 ADMIN PNEUMOCOCCAL VACCINE: HCPCS | Mod: S$GLB,,, | Performed by: FAMILY MEDICINE

## 2022-02-10 PROCEDURE — 1101F PR PT FALLS ASSESS DOC 0-1 FALLS W/OUT INJ PAST YR: ICD-10-PCS | Mod: HCNC,CPTII,S$GLB, | Performed by: FAMILY MEDICINE

## 2022-02-10 PROCEDURE — 3008F PR BODY MASS INDEX (BMI) DOCUMENTED: ICD-10-PCS | Mod: HCNC,CPTII,S$GLB, | Performed by: FAMILY MEDICINE

## 2022-02-10 PROCEDURE — 3074F SYST BP LT 130 MM HG: CPT | Mod: HCNC,CPTII,S$GLB, | Performed by: FAMILY MEDICINE

## 2022-02-10 PROCEDURE — 4010F ACE/ARB THERAPY RXD/TAKEN: CPT | Mod: HCNC,CPTII,S$GLB, | Performed by: FAMILY MEDICINE

## 2022-02-10 RX ORDER — AMLODIPINE BESYLATE 10 MG/1
10 TABLET ORAL DAILY
Qty: 90 TABLET | Refills: 3 | Status: SHIPPED | OUTPATIENT
Start: 2022-02-10 | End: 2022-06-03 | Stop reason: SDUPTHER

## 2022-02-10 RX ORDER — FENOFIBRIC ACID 45 MG/1
45 CAPSULE, DELAYED RELEASE ORAL DAILY
Qty: 90 CAPSULE | Refills: 3 | Status: SHIPPED | OUTPATIENT
Start: 2022-02-10 | End: 2022-07-26

## 2022-02-10 RX ORDER — FUROSEMIDE 20 MG/1
20 TABLET ORAL DAILY
Qty: 90 TABLET | Refills: 3 | Status: SHIPPED | OUTPATIENT
Start: 2022-02-10 | End: 2022-06-03 | Stop reason: SDUPTHER

## 2022-02-10 RX ORDER — LOSARTAN POTASSIUM 50 MG/1
50 TABLET ORAL DAILY
Qty: 90 TABLET | Refills: 3 | Status: SHIPPED | OUTPATIENT
Start: 2022-02-10 | End: 2022-09-06 | Stop reason: SDUPTHER

## 2022-02-10 RX ORDER — ATORVASTATIN CALCIUM 20 MG/1
TABLET, FILM COATED ORAL
Qty: 90 TABLET | Refills: 3 | Status: SHIPPED | OUTPATIENT
Start: 2022-02-10 | End: 2022-11-28 | Stop reason: SDUPTHER

## 2022-02-10 NOTE — PROGRESS NOTES
Subjective:       Patient ID: Gurwinder Hair is a 67 y.o. male.    Chief Complaint: Follow-up    HPI: 66 yo BM-- 6 month checkup--eating well--+BM--ambulating well.  History hypertension blood pressure today 124/80 .  Left wife of 9 years she was 49 want to be with a younger man living with 92 yo mother. Now  ,    ROS:  Skin: no psoriasis, eczema, skin cancer  HEENT: No headache, ocular pain, blurred vision, diplopia, epistaxis, hoarseness change in voice, thyroid trouble  Lung:  + hx pneumonia, no asthma, Tb, wheezing, SOB, +smoking 1 pack per day 2 years ago after the ammonia was on oxygen for while history chest x-ray showing right lower lobe nodule in change  Heart: No chest pain, ankle edema, palpitations, MI, +merary murmur, +hypertension,+ hx  Hyperlipidemia---no stent bypass arrhythmia EKG with inferior ST changes  Abdomen: No nausea, vomiting, diarrhea, constipation, ulcers, hepatitis, gallbladder disease, melena, hematochezia, hematemesis  : no UTI, renal disease, stones, prostate   MS: no fractures, O/A, lupus, rheumatoid, gout fracture left 3rd 4th and 5th digits, right wrist--some back pain gone    Neuro: No dizziness, LOC, seizures   No diabetes, no anemia, no anxiety, no depression  --2 children and 4 step children . , retired used work [employer] , Lives with mother     Objective:   Physical Exam:  General: Well nourished, well developed, no acute distress  Skin: No lesions  HEENT: Eyes PERRLA, EOM intact, nose patent, throat non-erythematous   NECK: Supple, no bruits, No JVD, no nodes  Lungs: Clear, no rales, rhonchi, wheezing  Heart: Regular rate and rhythm, no murmurs, gallops, or rubs  Abdomen: flat, bowel sounds positive, no tenderness, or organomegaly  MS: Range of motion and muscle strength intact  Neuro: Alert, CN intact, oriented X 3  Extremities: No cyanosis, clubbing, or edema         Assessment:       1. Need for vaccination    2. Essential hypertension     3. COPD, severity to be determined    4. Abdominal aortic aneurysm (AAA) without rupture    5. History of solitary pulmonary nodule    6. Tobacco abuse        Plan:       Need for vaccination  -     (In Office Administered) Pneumococcal Polysaccharide Vaccine (23 Valent) (SQ/IM)    Essential hypertension  -     Comprehensive Metabolic Panel; Future; Expected date: 02/10/2022  -     CBC Auto Differential; Future; Expected date: 02/10/2022  -     Lipid Panel; Future; Expected date: 02/10/2022    COPD, severity to be determined    Abdominal aortic aneurysm (AAA) without rupture  -     US Abdominal Aorta; Future; Expected date: 02/10/2022    History of solitary pulmonary nodule    Tobacco abuse    Other orders  -     amLODIPine (NORVASC) 10 MG tablet; Take 1 tablet (10 mg total) by mouth once daily.  Dispense: 90 tablet; Refill: 3  -     atorvastatin (LIPITOR) 20 MG tablet; TAKE 1 TABLET BY MOUTH EVERY EVENING  Dispense: 90 tablet; Refill: 3  -     fenofibric acid (FIBRICOR) 45 mg CpDR; Take 1 capsule (45 mg total) by mouth once daily.  Dispense: 90 capsule; Refill: 3  -     furosemide (LASIX) 20 MG tablet; Take 1 tablet (20 mg total) by mouth once daily.  Dispense: 90 tablet; Refill: 3  -     losartan (COZAAR) 50 MG tablet; Take 1 tablet (50 mg total) by mouth once daily.  Dispense: 90 tablet; Refill: 3        [place] for visit six-month checkup  Hypertension-blood pressure --blood pressure doing well 120/84 has arm blood pressure cuff Needs blood pressure be 140/90 or less and greater than 90/60 patient on amlodipine and Cozaar will decrease Cozaar from 100-50  Hyperlipidemia--on Lipitor 20  Low-sodium low-fat diet--exercise as tolerated--try to get ideal body weight  Tobacco abuse-1 pack per day-tried Nicoderm gum tried Chantix--told needs to stop smoking--smoking cessation program at hospital available  History pulmonary nodule should do chest x-ray February 2022  History abdominal aortic aneurysm needs to redo  ultrasound March 2022  Lab CBCs CMP lipids now and in 6 months  Health maintenance pneumococcal and flu vaccine  Six month supply of all medications

## 2022-02-21 PROBLEM — N18.9 CRI (CHRONIC RENAL INSUFFICIENCY): Status: ACTIVE | Noted: 2022-02-21

## 2022-03-02 DIAGNOSIS — I71.40 ABDOMINAL AORTIC ANEURYSM (AAA) WITHOUT RUPTURE: Primary | ICD-10-CM

## 2022-03-11 ENCOUNTER — TELEPHONE (OUTPATIENT)
Dept: PRIMARY CARE CLINIC | Facility: CLINIC | Age: 67
End: 2022-03-11
Payer: MEDICARE

## 2022-03-11 NOTE — TELEPHONE ENCOUNTER
----- Message from Amberly Kyle sent at 3/11/2022 11:11 AM CST -----  Regarding: Abdominal aortic aneurysm (AAA) without rupture  Please call patient about his Vascular Surgery referral. Thank you!

## 2022-04-28 ENCOUNTER — TELEPHONE (OUTPATIENT)
Dept: ADMINISTRATIVE | Facility: HOSPITAL | Age: 67
End: 2022-04-28
Payer: MEDICARE

## 2022-04-28 NOTE — TELEPHONE ENCOUNTER
----- Message from Aisha Lora sent at 4/28/2022 12:51 PM CDT -----  Contact: Pt [phone]  Patient is wanting to Establish Care with Dr Suarez and no available appointment are coming up.    Please call and advise.    Thank You

## 2022-05-05 ENCOUNTER — PATIENT MESSAGE (OUTPATIENT)
Dept: SMOKING CESSATION | Facility: CLINIC | Age: 67
End: 2022-05-05
Payer: MEDICARE

## 2022-05-25 ENCOUNTER — OFFICE VISIT (OUTPATIENT)
Dept: PRIMARY CARE CLINIC | Facility: CLINIC | Age: 67
End: 2022-05-25
Payer: MEDICARE

## 2022-05-25 VITALS
HEART RATE: 90 BPM | OXYGEN SATURATION: 88 % | DIASTOLIC BLOOD PRESSURE: 86 MMHG | SYSTOLIC BLOOD PRESSURE: 139 MMHG | WEIGHT: 205.25 LBS | BODY MASS INDEX: 33.13 KG/M2

## 2022-05-25 DIAGNOSIS — I10 ESSENTIAL HYPERTENSION: ICD-10-CM

## 2022-05-25 DIAGNOSIS — N18.32 STAGE 3B CHRONIC KIDNEY DISEASE: ICD-10-CM

## 2022-05-25 DIAGNOSIS — J44.1 COPD EXACERBATION: Primary | ICD-10-CM

## 2022-05-25 DIAGNOSIS — I71.40 ABDOMINAL AORTIC ANEURYSM (AAA) WITHOUT RUPTURE: ICD-10-CM

## 2022-05-25 DIAGNOSIS — Z72.0 TOBACCO ABUSE: ICD-10-CM

## 2022-05-25 DIAGNOSIS — R06.02 SHORTNESS OF BREATH: ICD-10-CM

## 2022-05-25 LAB
CTP QC/QA: YES
SARS-COV-2 RDRP RESP QL NAA+PROBE: NEGATIVE

## 2022-05-25 PROCEDURE — 3008F PR BODY MASS INDEX (BMI) DOCUMENTED: ICD-10-PCS | Mod: CPTII,S$GLB,, | Performed by: FAMILY MEDICINE

## 2022-05-25 PROCEDURE — 3075F SYST BP GE 130 - 139MM HG: CPT | Mod: CPTII,S$GLB,, | Performed by: FAMILY MEDICINE

## 2022-05-25 PROCEDURE — 99214 PR OFFICE/OUTPT VISIT, EST, LEVL IV, 30-39 MIN: ICD-10-PCS | Mod: S$GLB,,, | Performed by: FAMILY MEDICINE

## 2022-05-25 PROCEDURE — 99999 PR PBB SHADOW E&M-EST. PATIENT-LVL III: ICD-10-PCS | Mod: PBBFAC,,, | Performed by: FAMILY MEDICINE

## 2022-05-25 PROCEDURE — 3288F FALL RISK ASSESSMENT DOCD: CPT | Mod: CPTII,S$GLB,, | Performed by: FAMILY MEDICINE

## 2022-05-25 PROCEDURE — 99499 RISK ADDL DX/OHS AUDIT: ICD-10-PCS | Mod: S$GLB,,, | Performed by: FAMILY MEDICINE

## 2022-05-25 PROCEDURE — 99499 UNLISTED E&M SERVICE: CPT | Mod: S$GLB,,, | Performed by: FAMILY MEDICINE

## 2022-05-25 PROCEDURE — 3008F BODY MASS INDEX DOCD: CPT | Mod: CPTII,S$GLB,, | Performed by: FAMILY MEDICINE

## 2022-05-25 PROCEDURE — 4010F ACE/ARB THERAPY RXD/TAKEN: CPT | Mod: CPTII,S$GLB,, | Performed by: FAMILY MEDICINE

## 2022-05-25 PROCEDURE — U0002 COVID-19 LAB TEST NON-CDC: HCPCS | Mod: QW,S$GLB,, | Performed by: FAMILY MEDICINE

## 2022-05-25 PROCEDURE — 1159F PR MEDICATION LIST DOCUMENTED IN MEDICAL RECORD: ICD-10-PCS | Mod: CPTII,S$GLB,, | Performed by: FAMILY MEDICINE

## 2022-05-25 PROCEDURE — U0002: ICD-10-PCS | Mod: QW,S$GLB,, | Performed by: FAMILY MEDICINE

## 2022-05-25 PROCEDURE — 3288F PR FALLS RISK ASSESSMENT DOCUMENTED: ICD-10-PCS | Mod: CPTII,S$GLB,, | Performed by: FAMILY MEDICINE

## 2022-05-25 PROCEDURE — 1101F PR PT FALLS ASSESS DOC 0-1 FALLS W/OUT INJ PAST YR: ICD-10-PCS | Mod: CPTII,S$GLB,, | Performed by: FAMILY MEDICINE

## 2022-05-25 PROCEDURE — 4010F PR ACE/ARB THEARPY RXD/TAKEN: ICD-10-PCS | Mod: CPTII,S$GLB,, | Performed by: FAMILY MEDICINE

## 2022-05-25 PROCEDURE — 1159F MED LIST DOCD IN RCRD: CPT | Mod: CPTII,S$GLB,, | Performed by: FAMILY MEDICINE

## 2022-05-25 PROCEDURE — 3079F DIAST BP 80-89 MM HG: CPT | Mod: CPTII,S$GLB,, | Performed by: FAMILY MEDICINE

## 2022-05-25 PROCEDURE — 99214 OFFICE O/P EST MOD 30 MIN: CPT | Mod: S$GLB,,, | Performed by: FAMILY MEDICINE

## 2022-05-25 PROCEDURE — 3079F PR MOST RECENT DIASTOLIC BLOOD PRESSURE 80-89 MM HG: ICD-10-PCS | Mod: CPTII,S$GLB,, | Performed by: FAMILY MEDICINE

## 2022-05-25 PROCEDURE — 99999 PR PBB SHADOW E&M-EST. PATIENT-LVL III: CPT | Mod: PBBFAC,,, | Performed by: FAMILY MEDICINE

## 2022-05-25 PROCEDURE — 1101F PT FALLS ASSESS-DOCD LE1/YR: CPT | Mod: CPTII,S$GLB,, | Performed by: FAMILY MEDICINE

## 2022-05-25 PROCEDURE — 3075F PR MOST RECENT SYSTOLIC BLOOD PRESS GE 130-139MM HG: ICD-10-PCS | Mod: CPTII,S$GLB,, | Performed by: FAMILY MEDICINE

## 2022-05-25 RX ORDER — PREDNISONE 50 MG/1
50 TABLET ORAL DAILY
Qty: 5 TABLET | Refills: 0 | Status: SHIPPED | OUTPATIENT
Start: 2022-05-25 | End: 2022-06-03 | Stop reason: ALTCHOICE

## 2022-05-25 RX ORDER — AZITHROMYCIN 250 MG/1
TABLET, FILM COATED ORAL
Qty: 6 TABLET | Refills: 0 | Status: SHIPPED | OUTPATIENT
Start: 2022-05-25 | End: 2022-05-30

## 2022-05-25 NOTE — PROGRESS NOTES
Subjective:       Patient ID: Gurwinder Hair is a 67 y.o. male.    Chief Complaint: [facility]    68 yo M pt, new to me, with PMH significant for HTN, CKD3, COPD, Solitary Pulmonary Nodule (calcified granuloma in the RLL), AAA, Tobacco Use.  He presents to [facility]. He was previously followed by Dr. Sina Ball; last visit 2/10/22. He presents with c/o SOB x 2 weeks. 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  Reports that he developed shortness of breath that progressively worsened.  Reports having difficulty ambulating in the home; had minimal improvement with rest First 2-3 days had runny nose.  Subsequently developed a productive cough of yellow phlegm, wheezing, and chest tightness. Describes headache with excessive cough.  + subjective fevers/chills. No sore throat. No body aches  He has had an associated decreased appetite. Symptoms improved with night formulation of theraflu. Used nebulized albuterol about 3-4 times with improvement in symptoms. Over past week, he's been smoking 1-2 cigarettes daily; typically smokes 1 ppd. Has had J&J COVID-19 vaccine + 1 COVID-19 booster. He did not perform COVID-19 testing. Today he is feeling better. He was rx'd Symbicort in 2017/2018, but non-adherent with inhaler.      Past Medical History:   Diagnosis Date    Hypertension        Patient Active Problem List   Diagnosis    Other closed fractures of distal end of radius (alone)    Essential hypertension    CKD (chronic kidney disease) stage 3, GFR 30-59 ml/min    Tobacco abuse    History of bacterial pneumonia    History of solitary pulmonary nodule    Abdominal aortic aneurysm (AAA) without rupture    COPD, severity to be determined    CRI (chronic renal insufficiency)       Past Surgical History:   Procedure Laterality Date    FINGER FRACTURE SURGERY         Family History   Problem Relation Age of Onset    No Known Problems Mother      No Known Problems Father        Social History     Tobacco Use   Smoking Status Current Every Day Smoker    Packs/day: 1.00   Smokeless Tobacco Never Used       Social History     Social History Narrative    Tobacco: Initiated at age 11 yo; max use 1ppd    EtOH: None    Drug: None    Employment: Retired     Education: 1 year of college    Lives alone         2 adult children        Medications have been reviewed and reconciled.     Review of patient's allergies indicates:   Allergen Reactions    Codeine Itching        Review of Systems   Constitutional: Positive for chills, fatigue and fever.   HENT: Negative for congestion, postnasal drip, rhinorrhea, sinus pressure, sinus pain, sneezing and sore throat.    Respiratory: Positive for cough, shortness of breath and wheezing.    Cardiovascular: Negative for chest pain, palpitations and leg swelling.   Neurological: Positive for headaches.         Objective:        /86 (BP Location: Right arm, Patient Position: Sitting, BP Method: X-Large (Automatic))   Pulse 90   Wt 93.1 kg (205 lb 4 oz)   SpO2 (!) 88%   BMI 33.13 kg/m²      Physical Exam  Constitutional:       General: He is not in acute distress.     Appearance: He is well-developed. He is obese.   HENT:      Head: Normocephalic and atraumatic.      Right Ear: External ear normal.      Left Ear: External ear normal.   Eyes:      General: No scleral icterus.     Extraocular Movements: Extraocular movements intact.      Conjunctiva/sclera: Conjunctivae normal.   Cardiovascular:      Rate and Rhythm: Normal rate and regular rhythm.      Heart sounds: Normal heart sounds. No murmur heard.    No friction rub. No gallop.   Pulmonary:      Effort: Pulmonary effort is normal. No respiratory distress.      Breath sounds: Wheezing (expiratory wheezing throughout with prolonged expiration) and rhonchi present. No rales.   Musculoskeletal:         General: No tenderness or deformity. Normal  range of motion.      Cervical back: Normal range of motion.      Right lower leg: No edema.      Left lower leg: No edema.   Skin:     General: Skin is warm and dry.      Findings: No erythema or rash.   Neurological:      Mental Status: He is alert and oriented to person, place, and time.      Cranial Nerves: No cranial nerve deficit.      Motor: No abnormal muscle tone.      Gait: Gait normal.   Psychiatric:         Behavior: Behavior normal.         Assessment:       1. COPD exacerbation    2. Shortness of breath    3. Tobacco abuse    4. Essential hypertension    5. Stage 3b chronic kidney disease    6. Abdominal aortic aneurysm (AAA) without rupture        Plan:       Gurwinder was seen today for establish care.    Diagnoses and all orders for this visit:    COPD exacerbation  Comments:  Pred 50 mg x 5 days. Z-pack given age >66 yo. Start combivent prn and incruse as preventive. Smoking Cessation  Orders:  -     ipratropium-albuteroL (COMBIVENT)  mcg/actuation inhaler; Inhale 2 puffs into the lungs every 6 (six) hours as needed for Wheezing. Rescue  -     predniSONE (DELTASONE) 50 MG Tab; Take 1 tablet (50 mg total) by mouth once daily.  -     azithromycin (Z-LÁZARO) 250 MG tablet; Take 2 tablets by mouth on day 1; Take 1 tablet by mouth on days 2-5  -     umeclidinium (INCRUSE ELLIPTA) 62.5 mcg/actuation inhalation capsule; Inhale 62.5 mcg into the lungs once daily. Controller    Shortness of breath  Comments:  POCT COVID-19 test negative in office today 5/25/22. SOB likely due to COPD exacerbation. See above.   Orders:  -     POCT COVID-19 Rapid Screening; Future  -     POCT COVID-19 Rapid Screening    Tobacco abuse  -     Ambulatory referral/consult to Smoking Cessation Program; Future    Essential hypertension  Comments:  Cont amlodipine 10 mg daily + Losartan 50 mg daily.     Stage 3b chronic kidney disease  Comments:  BUN 17, Cr 1.8, eGFR 38.4 2/18/22    Abdominal aortic aneurysm (AAA) without  rupture  Comments:  US AAA 2/18/2022 with max diameter of 5cm; unchanged from 2021. Evaluated by vascular 3/2021. Will discuss f/u on next visit    US AA 2/10/2022  Distal abdominal aortic aneurysm that measures up to 5 cm.  The size of this aneurysm is unchanged from 02/15/2021, although there appears to be more plaque formation today when compared to that exam.  The aneurysm extends into the left iliac artery.  The proximal abdominal aorta measures up to 3.1 cm, also unchanged.    Follow up in about 10 days (around 6/4/2022) for F/U COPD exacerbation (Please scheduled for 9a on 6/3/2022).    I spent a total of 40 minutes on the day of the visit.This includes face to face time and non-face to face time preparing to see the patient (eg, review of tests), obtaining and/or reviewing separately obtained history, documenting clinical information in the electronic or other health record, independently interpreting results and communicating results to the patient/family/caregiver, or care coordinator.

## 2022-06-03 ENCOUNTER — OFFICE VISIT (OUTPATIENT)
Dept: PRIMARY CARE CLINIC | Facility: CLINIC | Age: 67
End: 2022-06-03
Payer: MEDICARE

## 2022-06-03 VITALS
HEART RATE: 73 BPM | SYSTOLIC BLOOD PRESSURE: 128 MMHG | WEIGHT: 207.44 LBS | TEMPERATURE: 99 F | DIASTOLIC BLOOD PRESSURE: 78 MMHG | BODY MASS INDEX: 33.34 KG/M2 | OXYGEN SATURATION: 97 % | HEIGHT: 66 IN

## 2022-06-03 DIAGNOSIS — I10 ESSENTIAL HYPERTENSION: ICD-10-CM

## 2022-06-03 DIAGNOSIS — N18.32 STAGE 3B CHRONIC KIDNEY DISEASE: ICD-10-CM

## 2022-06-03 DIAGNOSIS — I71.40 ABDOMINAL AORTIC ANEURYSM (AAA) WITHOUT RUPTURE: ICD-10-CM

## 2022-06-03 DIAGNOSIS — Z72.0 TOBACCO ABUSE: ICD-10-CM

## 2022-06-03 DIAGNOSIS — J41.0 SIMPLE CHRONIC BRONCHITIS: Primary | ICD-10-CM

## 2022-06-03 DIAGNOSIS — E78.5 HYPERLIPIDEMIA, UNSPECIFIED HYPERLIPIDEMIA TYPE: ICD-10-CM

## 2022-06-03 PROCEDURE — 3074F SYST BP LT 130 MM HG: CPT | Mod: CPTII,S$GLB,, | Performed by: FAMILY MEDICINE

## 2022-06-03 PROCEDURE — 1101F PT FALLS ASSESS-DOCD LE1/YR: CPT | Mod: CPTII,S$GLB,, | Performed by: FAMILY MEDICINE

## 2022-06-03 PROCEDURE — 1159F PR MEDICATION LIST DOCUMENTED IN MEDICAL RECORD: ICD-10-PCS | Mod: CPTII,S$GLB,, | Performed by: FAMILY MEDICINE

## 2022-06-03 PROCEDURE — 1101F PR PT FALLS ASSESS DOC 0-1 FALLS W/OUT INJ PAST YR: ICD-10-PCS | Mod: CPTII,S$GLB,, | Performed by: FAMILY MEDICINE

## 2022-06-03 PROCEDURE — 1126F PR PAIN SEVERITY QUANTIFIED, NO PAIN PRESENT: ICD-10-PCS | Mod: CPTII,S$GLB,, | Performed by: FAMILY MEDICINE

## 2022-06-03 PROCEDURE — 3078F DIAST BP <80 MM HG: CPT | Mod: CPTII,S$GLB,, | Performed by: FAMILY MEDICINE

## 2022-06-03 PROCEDURE — 99999 PR PBB SHADOW E&M-EST. PATIENT-LVL III: ICD-10-PCS | Mod: PBBFAC,,, | Performed by: FAMILY MEDICINE

## 2022-06-03 PROCEDURE — 1126F AMNT PAIN NOTED NONE PRSNT: CPT | Mod: CPTII,S$GLB,, | Performed by: FAMILY MEDICINE

## 2022-06-03 PROCEDURE — 3008F PR BODY MASS INDEX (BMI) DOCUMENTED: ICD-10-PCS | Mod: CPTII,S$GLB,, | Performed by: FAMILY MEDICINE

## 2022-06-03 PROCEDURE — 99214 OFFICE O/P EST MOD 30 MIN: CPT | Mod: S$GLB,,, | Performed by: FAMILY MEDICINE

## 2022-06-03 PROCEDURE — 3078F PR MOST RECENT DIASTOLIC BLOOD PRESSURE < 80 MM HG: ICD-10-PCS | Mod: CPTII,S$GLB,, | Performed by: FAMILY MEDICINE

## 2022-06-03 PROCEDURE — 3074F PR MOST RECENT SYSTOLIC BLOOD PRESSURE < 130 MM HG: ICD-10-PCS | Mod: CPTII,S$GLB,, | Performed by: FAMILY MEDICINE

## 2022-06-03 PROCEDURE — 4010F PR ACE/ARB THEARPY RXD/TAKEN: ICD-10-PCS | Mod: CPTII,S$GLB,, | Performed by: FAMILY MEDICINE

## 2022-06-03 PROCEDURE — 99214 PR OFFICE/OUTPT VISIT, EST, LEVL IV, 30-39 MIN: ICD-10-PCS | Mod: S$GLB,,, | Performed by: FAMILY MEDICINE

## 2022-06-03 PROCEDURE — 3008F BODY MASS INDEX DOCD: CPT | Mod: CPTII,S$GLB,, | Performed by: FAMILY MEDICINE

## 2022-06-03 PROCEDURE — 3288F FALL RISK ASSESSMENT DOCD: CPT | Mod: CPTII,S$GLB,, | Performed by: FAMILY MEDICINE

## 2022-06-03 PROCEDURE — 4010F ACE/ARB THERAPY RXD/TAKEN: CPT | Mod: CPTII,S$GLB,, | Performed by: FAMILY MEDICINE

## 2022-06-03 PROCEDURE — 3288F PR FALLS RISK ASSESSMENT DOCUMENTED: ICD-10-PCS | Mod: CPTII,S$GLB,, | Performed by: FAMILY MEDICINE

## 2022-06-03 PROCEDURE — 1159F MED LIST DOCD IN RCRD: CPT | Mod: CPTII,S$GLB,, | Performed by: FAMILY MEDICINE

## 2022-06-03 PROCEDURE — 99999 PR PBB SHADOW E&M-EST. PATIENT-LVL III: CPT | Mod: PBBFAC,,, | Performed by: FAMILY MEDICINE

## 2022-06-03 RX ORDER — FUROSEMIDE 20 MG/1
20 TABLET ORAL DAILY
Qty: 90 TABLET | Refills: 3 | Status: SHIPPED | OUTPATIENT
Start: 2022-06-03 | End: 2022-06-21 | Stop reason: SDUPTHER

## 2022-06-03 RX ORDER — AMLODIPINE BESYLATE 10 MG/1
10 TABLET ORAL DAILY
Qty: 90 TABLET | Refills: 3 | Status: SHIPPED | OUTPATIENT
Start: 2022-06-03 | End: 2022-06-21 | Stop reason: SDUPTHER

## 2022-06-03 NOTE — PROGRESS NOTES
Subjective:       Patient ID: Gurwinder Hair is a 67 y.o. male.    Chief Complaint: Follow-up and Medication Refill    68 yo M, known to me (last visit 05/25/2022) with PMH significant for HTN, CKD3, COPD, Solitary Pulmonary Nodule (calcified granuloma in the RLL), AAA, Tobacco Use. He presents to f/u COPD exacerbation. Details of previous visit reviewed:   =========   He presents with c/o SOB x 2 weeks. 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  Reports that he developed shortness of breath that progressively worsened.  Reports having difficulty ambulating in the home; had minimal improvement with rest First 2-3 days had runny nose.  Subsequently developed a productive cough of yellow phlegm, wheezing, and chest tightness. Describes headache with excessive cough.  + subjective fevers/chills. No sore throat. No body aches  He has had an associated decreased appetite. Symptoms improved with night formulation of theraflu. Used nebulized albuterol about 3-4 times with improvement in symptoms. Over past week, he's been smoking 1-2 cigarettes daily; typically smokes 1 ppd. Has had J&J COVID-19 vaccine + 1 COVID-19 booster. He did not perform COVID-19 testing. Today he is feeling better. He was rx'd Symbicort in 2017/2018, but non-adherent with inhaler.  ========  He was treated with pred 50 mg x 5 days + Z-pack. Also started on combivent prn and incruse as preventive. Patient no longer with cough, wheeze, or shortness of breath. Continues adherence with incruse. Referred to smoking cessation; has not called back to schedule appointment.     He has been out of amlodipine 10 mg daily and lasix 20 mg daily x 1 week and would like refills. Reports he was previously rx'd losartan 100 mg daily, but this was subsequently decreased to 50 mg daily. Reports dizziness with valsalva (ie laughing). Unclear why he is rx'd lasix. No h/o CHF or LE swelling.         Past  "Medical History:   Diagnosis Date    Hypertension        Patient Active Problem List   Diagnosis    Other closed fractures of distal end of radius (alone)    Essential hypertension    Hyperlipidemia    Stage 3b chronic kidney disease    Tobacco abuse    History of bacterial pneumonia    History of solitary pulmonary nodule    Abdominal aortic aneurysm (AAA) without rupture    COPD, severity to be determined    CRI (chronic renal insufficiency)       Past Surgical History:   Procedure Laterality Date    FINGER FRACTURE SURGERY         Family History   Problem Relation Age of Onset    No Known Problems Mother     No Known Problems Father        Social History     Tobacco Use   Smoking Status Current Every Day Smoker    Packs/day: 1.00   Smokeless Tobacco Never Used       Social History     Social History Narrative    Tobacco: Initiated at age 11 yo; max use 1ppd    EtOH: None    Drug: None    Employment: Retired     Education: 1 year of college    Lives alone         2 adult children        Medications have been reviewed and reconciled.     Review of patient's allergies indicates:   Allergen Reactions    Codeine Itching        Review of Systems   Constitutional: Negative for chills, fatigue and fever.   HENT: Negative for congestion, postnasal drip, rhinorrhea, sinus pressure, sinus pain, sneezing and sore throat.    Respiratory: Negative for cough, shortness of breath and wheezing.    Cardiovascular: Negative for chest pain, palpitations and leg swelling.   Neurological: Positive for headaches. Light-headedness: with valsalva.         Objective:        /78 (BP Location: Right arm, Patient Position: Sitting, BP Method: Large (Automatic))   Pulse 73   Temp 98.8 °F (37.1 °C) (Oral)   Ht 5' 6" (1.676 m)   Wt 94.1 kg (207 lb 7.3 oz)   SpO2 97%   BMI 33.48 kg/m²      Physical Exam  Constitutional:       General: He is not in acute distress.     Appearance: He is " well-developed. He is obese.   HENT:      Head: Normocephalic and atraumatic.      Right Ear: External ear normal.      Left Ear: External ear normal.   Eyes:      General: No scleral icterus.     Extraocular Movements: Extraocular movements intact.      Conjunctiva/sclera: Conjunctivae normal.   Cardiovascular:      Rate and Rhythm: Normal rate and regular rhythm.      Heart sounds: Normal heart sounds. No murmur heard.    No friction rub. No gallop.   Pulmonary:      Effort: Pulmonary effort is normal. No respiratory distress.      Breath sounds: No wheezing, rhonchi or rales.   Musculoskeletal:         General: No tenderness or deformity. Normal range of motion.      Cervical back: Normal range of motion.      Right lower leg: No edema.      Left lower leg: No edema.   Skin:     General: Skin is warm and dry.      Findings: No erythema or rash.   Neurological:      Mental Status: He is alert and oriented to person, place, and time.      Cranial Nerves: No cranial nerve deficit.      Motor: No abnormal muscle tone.      Gait: Gait normal.   Psychiatric:         Behavior: Behavior normal.         Assessment:       1. Simple chronic bronchitis    2. Tobacco abuse    3. Essential hypertension    4. Hyperlipidemia, unspecified hyperlipidemia type    5. Stage 3b chronic kidney disease    6. Abdominal aortic aneurysm (AAA) without rupture        Plan:       Gurwinder was seen today for follow-up and medication refill.    Diagnoses and all orders for this visit:    Simple chronic bronchitis    Tobacco abuse    Essential hypertension  -     amLODIPine (NORVASC) 10 MG tablet; Take 1 tablet (10 mg total) by mouth once daily.  -     furosemide (LASIX) 20 MG tablet; Take 1 tablet (20 mg total) by mouth once daily.    Hyperlipidemia, unspecified hyperlipidemia type    Stage 3b chronic kidney disease    Abdominal aortic aneurysm (AAA) without rupture      COPD   Comments:  Exacerbation resolved with Pred 50 mg x 5 days + Z-pack  given age >65 yo.   Continue combivent prn and incruse daily as preventive. Encouraged to schedule with smoking Cessation    Tobacco abuse   Encouraged to schedule with smoking Cessation      Essential hypertension  Comments:  BP at target despite being out of amlodipine 10 mg daily x 1 week. Continue Losartan 50 mg daily. Refilling amlodipine 10 mg daily + lasix 20 mg daily. Strongly consider d/c'ing lasix and decreasing amlodipine 10-->5 mg daily pending BP reads on f/u    HLD  Comments:  , , HDL 31, .0 2/18/22. Continue atorvastatin 20 mg hs. Continue fenofibric acid 45 mg daily. Consider d/cing fibrate in futrue    Stage 3b chronic kidney disease  Comments:  BUN 17, Cr 1.8, eGFR 38.4 2/18/22    Abdominal aortic aneurysm (AAA) without rupture  Comments:  US AAA 2/18/2022 with max diameter of 5cm; unchanged from 2021. Evaluated by vascular 3/2021. Will discuss f/u on next visit    US AA 2/10/2022  Distal abdominal aortic aneurysm that measures up to 5 cm.  The size of this aneurysm is unchanged from 02/15/2021, although there appears to be more plaque formation today when compared to that exam.  The aneurysm extends into the left iliac artery.  The proximal abdominal aorta measures up to 3.1 cm, also unchanged.    I spent a total of 30 minutes on the day of the visit.This includes face to face time and non-face to face time preparing to see the patient (eg, review of tests), obtaining and/or reviewing separately obtained history, documenting clinical information in the electronic or other health record, independently interpreting results and communicating results to the patient/family/caregiver, or care coordinator.    Follow up in about 3 months (around 9/3/2022) for f/u chronic medical conditions.

## 2022-06-07 ENCOUNTER — CLINICAL SUPPORT (OUTPATIENT)
Dept: SMOKING CESSATION | Facility: CLINIC | Age: 67
End: 2022-06-07
Payer: COMMERCIAL

## 2022-06-07 DIAGNOSIS — F17.200 NICOTINE DEPENDENCE: Primary | ICD-10-CM

## 2022-06-07 PROCEDURE — 99404 PR PREVENT COUNSEL,INDIV,60 MIN: ICD-10-PCS | Mod: S$GLB,,, | Performed by: FAMILY MEDICINE

## 2022-06-07 PROCEDURE — 99404 PREV MED CNSL INDIV APPRX 60: CPT | Mod: S$GLB,,, | Performed by: FAMILY MEDICINE

## 2022-06-07 RX ORDER — IBUPROFEN 200 MG
1 TABLET ORAL DAILY
Qty: 14 PATCH | Refills: 0 | Status: SHIPPED | OUTPATIENT
Start: 2022-06-07 | End: 2022-06-21 | Stop reason: SDUPTHER

## 2022-06-07 NOTE — Clinical Note
Patient was in seen clinic today for intake.  Patient smokes 1 pack a day. Patient was prescribed tobacco cessation medication regimen of 21 mg nicotine patch.  Patient will participating bi weekly sessions

## 2022-06-07 NOTE — PROGRESS NOTES
Patient was in seen clinic today for intake.  Patient smokes 1 pack a day. Patient was prescribed tobacco cessation medication regimen of 21 mg nicotine patch.  Patient will participating bi weekly sessions.

## 2022-06-08 ENCOUNTER — TELEPHONE (OUTPATIENT)
Dept: PRIMARY CARE CLINIC | Facility: CLINIC | Age: 67
End: 2022-06-08
Payer: MEDICARE

## 2022-06-08 NOTE — TELEPHONE ENCOUNTER
----- Message from Peyton Mandel sent at 6/8/2022 12:14 PM CDT -----  Contact: Patient [phone]  Requesting an RX refill or new RX.  Is this a refill or new RX: new  RX name and strength (copy/paste from chart):  losartan (COZAAR) 50 MG tablet  Is this a 30 day or 90 day RX: 90  Pharmacy name and phone # (copy/paste from chart):   [id number] - [place] - [address] AT [facility] [address]  [address] [address]  Phone: [phone] Fax: [phone]    Pt states the pharmacy advised the pharmacy advised they do not have the Rxs for amLODIPine (NORVASC) 10 MG tablet and furosemide (LASIX) 20 MG tablet that was sent over on 06/03/2022.   Pt is requesting a call from the office.

## 2022-06-08 NOTE — TELEPHONE ENCOUNTER
Spoke with patient and [facility] pharmacy and informed that patient the the medication the he is requesting a refill for was sent into the pharmacy on 06/03/2022. Upon talking to pharmacy they stated that it was to soon for the patient to have the medication refilled and that he could not received the medication until August. Patient was then notified of pharmacy explanation. Patient verbalized understanding to all information noted above.

## 2022-06-20 ENCOUNTER — NURSE TRIAGE (OUTPATIENT)
Dept: ADMINISTRATIVE | Facility: CLINIC | Age: 67
End: 2022-06-20
Payer: MEDICARE

## 2022-06-20 NOTE — TELEPHONE ENCOUNTER
Pt calling regarding elevated blood pressure of 164/100. 159/102 currently. He also reports chest tightness/pressure. Advised, per protocol. He verbalizes understanding, may arrive POV to nearest ED.

## 2022-06-20 NOTE — TELEPHONE ENCOUNTER
Spoke to pt who states he is currently in ER at [facility] getting worked up. 2 wk f/u scheduled and mailed.

## 2022-06-21 ENCOUNTER — OFFICE VISIT (OUTPATIENT)
Dept: CARDIOLOGY | Facility: CLINIC | Age: 67
End: 2022-06-21
Payer: MEDICARE

## 2022-06-21 ENCOUNTER — HOSPITAL ENCOUNTER (EMERGENCY)
Facility: OTHER | Age: 67
Discharge: HOME | End: 2022-06-21
Attending: EMERGENCY MEDICINE
Payer: MEDICARE

## 2022-06-21 ENCOUNTER — CLINICAL SUPPORT (OUTPATIENT)
Dept: SMOKING CESSATION | Facility: CLINIC | Age: 67
End: 2022-06-21
Payer: COMMERCIAL

## 2022-06-21 VITALS
SYSTOLIC BLOOD PRESSURE: 161 MMHG | DIASTOLIC BLOOD PRESSURE: 86 MMHG | WEIGHT: 206 LBS | HEART RATE: 88 BPM | HEIGHT: 66 IN | BODY MASS INDEX: 33.11 KG/M2 | RESPIRATION RATE: 20 BRPM | TEMPERATURE: 98 F | OXYGEN SATURATION: 95 %

## 2022-06-21 VITALS
DIASTOLIC BLOOD PRESSURE: 92 MMHG | BODY MASS INDEX: 33.06 KG/M2 | HEIGHT: 66 IN | HEART RATE: 75 BPM | SYSTOLIC BLOOD PRESSURE: 134 MMHG | WEIGHT: 205.69 LBS | OXYGEN SATURATION: 95 %

## 2022-06-21 DIAGNOSIS — I10 ESSENTIAL HYPERTENSION: ICD-10-CM

## 2022-06-21 DIAGNOSIS — R07.89 CHEST PAIN, NON-CARDIAC: Primary | ICD-10-CM

## 2022-06-21 DIAGNOSIS — I71.40 ABDOMINAL AORTIC ANEURYSM (AAA) WITHOUT RUPTURE: Primary | ICD-10-CM

## 2022-06-21 DIAGNOSIS — R07.9 CHEST PAIN: ICD-10-CM

## 2022-06-21 DIAGNOSIS — E78.5 HYPERLIPIDEMIA, UNSPECIFIED HYPERLIPIDEMIA TYPE: ICD-10-CM

## 2022-06-21 DIAGNOSIS — F17.200 NICOTINE DEPENDENCE: Primary | ICD-10-CM

## 2022-06-21 DIAGNOSIS — R07.89 OTHER CHEST PAIN: ICD-10-CM

## 2022-06-21 DIAGNOSIS — R94.31 NONSPECIFIC ABNORMAL ELECTROCARDIOGRAM (ECG) (EKG): ICD-10-CM

## 2022-06-21 DIAGNOSIS — K21.9 GASTROESOPHAGEAL REFLUX DISEASE, UNSPECIFIED WHETHER ESOPHAGITIS PRESENT: ICD-10-CM

## 2022-06-21 DIAGNOSIS — Z72.0 TOBACCO ABUSE: ICD-10-CM

## 2022-06-21 LAB
ALBUMIN SERPL BCP-MCNC: 4.1 G/DL (ref 3.5–5.2)
ALP SERPL-CCNC: 53 U/L (ref 55–135)
ALT SERPL W/O P-5'-P-CCNC: 21 U/L (ref 10–44)
ANION GAP SERPL CALC-SCNC: 10 MMOL/L (ref 8–16)
AST SERPL-CCNC: 23 U/L (ref 10–40)
BASOPHILS # BLD AUTO: 0.02 K/UL (ref 0–0.2)
BASOPHILS NFR BLD: 0.3 % (ref 0–1.9)
BILIRUB SERPL-MCNC: 0.5 MG/DL (ref 0.1–1)
BNP SERPL-MCNC: <10 PG/ML (ref 0–99)
BUN SERPL-MCNC: 15 MG/DL (ref 8–23)
CALCIUM SERPL-MCNC: 10.3 MG/DL (ref 8.7–10.5)
CHLORIDE SERPL-SCNC: 107 MMOL/L (ref 95–110)
CO2 SERPL-SCNC: 26 MMOL/L (ref 23–29)
CREAT SERPL-MCNC: 1.8 MG/DL (ref 0.5–1.4)
DIFFERENTIAL METHOD: NORMAL
EOSINOPHIL # BLD AUTO: 0.3 K/UL (ref 0–0.5)
EOSINOPHIL NFR BLD: 4.8 % (ref 0–8)
ERYTHROCYTE [DISTWIDTH] IN BLOOD BY AUTOMATED COUNT: 14.1 % (ref 11.5–14.5)
EST. GFR  (AFRICAN AMERICAN): 44 ML/MIN/1.73 M^2
EST. GFR  (NON AFRICAN AMERICAN): 38 ML/MIN/1.73 M^2
GLUCOSE SERPL-MCNC: 86 MG/DL (ref 70–110)
HCT VFR BLD AUTO: 49.2 % (ref 40–54)
HGB BLD-MCNC: 16.2 G/DL (ref 14–18)
IMM GRANULOCYTES # BLD AUTO: 0.02 K/UL (ref 0–0.04)
IMM GRANULOCYTES NFR BLD AUTO: 0.3 % (ref 0–0.5)
LYMPHOCYTES # BLD AUTO: 1.9 K/UL (ref 1–4.8)
LYMPHOCYTES NFR BLD: 29.5 % (ref 18–48)
MCH RBC QN AUTO: 30.3 PG (ref 27–31)
MCHC RBC AUTO-ENTMCNC: 32.9 G/DL (ref 32–36)
MCV RBC AUTO: 92 FL (ref 82–98)
MONOCYTES # BLD AUTO: 0.5 K/UL (ref 0.3–1)
MONOCYTES NFR BLD: 7.5 % (ref 4–15)
NEUTROPHILS # BLD AUTO: 3.7 K/UL (ref 1.8–7.7)
NEUTROPHILS NFR BLD: 57.6 % (ref 38–73)
NRBC BLD-RTO: 0 /100 WBC
PLATELET # BLD AUTO: 195 K/UL (ref 150–450)
PMV BLD AUTO: 10.8 FL (ref 9.2–12.9)
POTASSIUM SERPL-SCNC: 4.3 MMOL/L (ref 3.5–5.1)
PROT SERPL-MCNC: 7.9 G/DL (ref 6–8.4)
RBC # BLD AUTO: 5.34 M/UL (ref 4.6–6.2)
SODIUM SERPL-SCNC: 143 MMOL/L (ref 136–145)
TROPONIN I SERPL DL<=0.01 NG/ML-MCNC: 0.01 NG/ML (ref 0–0.03)
WBC # BLD AUTO: 6.4 K/UL (ref 3.9–12.7)

## 2022-06-21 PROCEDURE — 1125F AMNT PAIN NOTED PAIN PRSNT: CPT | Mod: CPTII,S$GLB,, | Performed by: INTERNAL MEDICINE

## 2022-06-21 PROCEDURE — 99403 PREV MED CNSL INDIV APPRX 45: CPT | Mod: S$GLB,,,

## 2022-06-21 PROCEDURE — 93005 ELECTROCARDIOGRAM TRACING: CPT

## 2022-06-21 PROCEDURE — 1125F PR PAIN SEVERITY QUANTIFIED, PAIN PRESENT: ICD-10-PCS | Mod: CPTII,S$GLB,, | Performed by: INTERNAL MEDICINE

## 2022-06-21 PROCEDURE — 83880 ASSAY OF NATRIURETIC PEPTIDE: CPT | Performed by: NURSE PRACTITIONER

## 2022-06-21 PROCEDURE — 3008F PR BODY MASS INDEX (BMI) DOCUMENTED: ICD-10-PCS | Mod: CPTII,S$GLB,, | Performed by: INTERNAL MEDICINE

## 2022-06-21 PROCEDURE — 3080F PR MOST RECENT DIASTOLIC BLOOD PRESSURE >= 90 MM HG: ICD-10-PCS | Mod: CPTII,S$GLB,, | Performed by: INTERNAL MEDICINE

## 2022-06-21 PROCEDURE — 3080F DIAST BP >= 90 MM HG: CPT | Mod: CPTII,S$GLB,, | Performed by: INTERNAL MEDICINE

## 2022-06-21 PROCEDURE — 99204 OFFICE O/P NEW MOD 45 MIN: CPT | Mod: S$GLB,,, | Performed by: INTERNAL MEDICINE

## 2022-06-21 PROCEDURE — 84484 ASSAY OF TROPONIN QUANT: CPT | Performed by: NURSE PRACTITIONER

## 2022-06-21 PROCEDURE — 3288F FALL RISK ASSESSMENT DOCD: CPT | Mod: CPTII,S$GLB,, | Performed by: INTERNAL MEDICINE

## 2022-06-21 PROCEDURE — 99285 EMERGENCY DEPT VISIT HI MDM: CPT | Mod: 25

## 2022-06-21 PROCEDURE — 3288F PR FALLS RISK ASSESSMENT DOCUMENTED: ICD-10-PCS | Mod: CPTII,S$GLB,, | Performed by: INTERNAL MEDICINE

## 2022-06-21 PROCEDURE — 36000 PLACE NEEDLE IN VEIN: CPT

## 2022-06-21 PROCEDURE — 3008F BODY MASS INDEX DOCD: CPT | Mod: CPTII,S$GLB,, | Performed by: INTERNAL MEDICINE

## 2022-06-21 PROCEDURE — 4010F PR ACE/ARB THEARPY RXD/TAKEN: ICD-10-PCS | Mod: CPTII,S$GLB,, | Performed by: INTERNAL MEDICINE

## 2022-06-21 PROCEDURE — 99999 PR PBB SHADOW E&M-EST. PATIENT-LVL III: CPT | Mod: PBBFAC,,, | Performed by: INTERNAL MEDICINE

## 2022-06-21 PROCEDURE — 1101F PT FALLS ASSESS-DOCD LE1/YR: CPT | Mod: CPTII,S$GLB,, | Performed by: INTERNAL MEDICINE

## 2022-06-21 PROCEDURE — 99999 PR PBB SHADOW E&M-EST. PATIENT-LVL III: ICD-10-PCS | Mod: PBBFAC,,, | Performed by: INTERNAL MEDICINE

## 2022-06-21 PROCEDURE — 93010 ELECTROCARDIOGRAM REPORT: CPT | Mod: 76,,, | Performed by: INTERNAL MEDICINE

## 2022-06-21 PROCEDURE — 3075F SYST BP GE 130 - 139MM HG: CPT | Mod: CPTII,S$GLB,, | Performed by: INTERNAL MEDICINE

## 2022-06-21 PROCEDURE — 25000003 PHARM REV CODE 250: Performed by: NURSE PRACTITIONER

## 2022-06-21 PROCEDURE — 4010F ACE/ARB THERAPY RXD/TAKEN: CPT | Mod: CPTII,S$GLB,, | Performed by: INTERNAL MEDICINE

## 2022-06-21 PROCEDURE — 3075F PR MOST RECENT SYSTOLIC BLOOD PRESS GE 130-139MM HG: ICD-10-PCS | Mod: CPTII,S$GLB,, | Performed by: INTERNAL MEDICINE

## 2022-06-21 PROCEDURE — 80053 COMPREHEN METABOLIC PANEL: CPT | Performed by: NURSE PRACTITIONER

## 2022-06-21 PROCEDURE — 93010 ELECTROCARDIOGRAM REPORT: CPT | Mod: ,,, | Performed by: INTERNAL MEDICINE

## 2022-06-21 PROCEDURE — 1159F MED LIST DOCD IN RCRD: CPT | Mod: CPTII,S$GLB,, | Performed by: INTERNAL MEDICINE

## 2022-06-21 PROCEDURE — 1101F PR PT FALLS ASSESS DOC 0-1 FALLS W/OUT INJ PAST YR: ICD-10-PCS | Mod: CPTII,S$GLB,, | Performed by: INTERNAL MEDICINE

## 2022-06-21 PROCEDURE — 99204 PR OFFICE/OUTPT VISIT, NEW, LEVL IV, 45-59 MIN: ICD-10-PCS | Mod: S$GLB,,, | Performed by: INTERNAL MEDICINE

## 2022-06-21 PROCEDURE — 1159F PR MEDICATION LIST DOCUMENTED IN MEDICAL RECORD: ICD-10-PCS | Mod: CPTII,S$GLB,, | Performed by: INTERNAL MEDICINE

## 2022-06-21 PROCEDURE — 93010 EKG 12-LEAD: ICD-10-PCS | Mod: 76,,, | Performed by: INTERNAL MEDICINE

## 2022-06-21 PROCEDURE — 85025 COMPLETE CBC W/AUTO DIFF WBC: CPT | Performed by: NURSE PRACTITIONER

## 2022-06-21 PROCEDURE — 99403 PR PREVENT COUNSEL,INDIV,45 MIN: ICD-10-PCS | Mod: S$GLB,,,

## 2022-06-21 RX ORDER — MAG HYDROX/ALUMINUM HYD/SIMETH 200-200-20
30 SUSPENSION, ORAL (FINAL DOSE FORM) ORAL ONCE
Status: COMPLETED | OUTPATIENT
Start: 2022-06-21 | End: 2022-06-21

## 2022-06-21 RX ORDER — IBUPROFEN 200 MG
1 TABLET ORAL DAILY
Qty: 14 PATCH | Refills: 0 | Status: SHIPPED | OUTPATIENT
Start: 2022-06-21 | End: 2022-08-02 | Stop reason: SDUPTHER

## 2022-06-21 RX ORDER — FAMOTIDINE 20 MG/1
20 TABLET, FILM COATED ORAL
Status: COMPLETED | OUTPATIENT
Start: 2022-06-21 | End: 2022-06-21

## 2022-06-21 RX ORDER — FUROSEMIDE 20 MG/1
20 TABLET ORAL DAILY
Qty: 30 TABLET | Refills: 0 | Status: SHIPPED | OUTPATIENT
Start: 2022-06-21 | End: 2022-06-27 | Stop reason: ALTCHOICE

## 2022-06-21 RX ORDER — PANTOPRAZOLE SODIUM 20 MG/1
20 TABLET, DELAYED RELEASE ORAL DAILY
Qty: 30 TABLET | Refills: 0 | Status: SHIPPED | OUTPATIENT
Start: 2022-06-21 | End: 2022-07-13 | Stop reason: SDUPTHER

## 2022-06-21 RX ORDER — AMLODIPINE BESYLATE 10 MG/1
10 TABLET ORAL DAILY
Qty: 30 TABLET | Refills: 0 | Status: SHIPPED | OUTPATIENT
Start: 2022-06-21 | End: 2022-07-13 | Stop reason: SDUPTHER

## 2022-06-21 RX ORDER — AMLODIPINE BESYLATE 5 MG/1
10 TABLET ORAL
Status: COMPLETED | OUTPATIENT
Start: 2022-06-21 | End: 2022-06-21

## 2022-06-21 RX ORDER — LIDOCAINE HYDROCHLORIDE 20 MG/ML
10 SOLUTION OROPHARYNGEAL ONCE
Status: COMPLETED | OUTPATIENT
Start: 2022-06-21 | End: 2022-06-21

## 2022-06-21 RX ADMIN — FAMOTIDINE 20 MG: 20 TABLET ORAL at 08:06

## 2022-06-21 RX ADMIN — AMLODIPINE BESYLATE 10 MG: 5 TABLET ORAL at 08:06

## 2022-06-21 RX ADMIN — LIDOCAINE HYDROCHLORIDE 10 ML: 20 SOLUTION ORAL; TOPICAL at 07:06

## 2022-06-21 RX ADMIN — ALUMINUM HYDROXIDE, MAGNESIUM HYDROXIDE, AND SIMETHICONE 30 ML: 200; 200; 20 SUSPENSION ORAL at 07:06

## 2022-06-21 NOTE — PROGRESS NOTES
Cardiology    6/21/2022  11:48 AM    Problem list  Patient Active Problem List   Diagnosis    Other closed fractures of distal end of radius (alone)    Essential hypertension    Hyperlipidemia    Stage 3b chronic kidney disease    Tobacco abuse    History of bacterial pneumonia    History of solitary pulmonary nodule    Abdominal aortic aneurysm (AAA) without rupture    COPD, severity to be determined    CRI (chronic renal insufficiency)    Other chest pain       CC:  Chest pain    HPI:  Patient is here for same-day appointment for evaluation of chest pain.  He went to [facility] with  yesterday.  He described his chest pain as a pulling sensation.  He states his blood pressure is not well controlled.  He was on losartan 100 mg in the past and his blood pressure was well controlled.  For some reason it was decreased to 50 mg. He also has not filled his amlodipine on Lasix because the pharmacy did not have them in stock.  He has not had any recent cardiac workup.  He just started smoking sensation program last week.  He used to smoke 1 pack per day and now is down to 1 pack per week.  There is no family history for premature coronary disease.  His EKG performed yesterday shows sinus rhythm with inferior Q-waves.  He denies having chest pain on exertion.  He denies any palpitation or syncope.    Medications  Current Outpatient Medications   Medication Sig Dispense Refill    atorvastatin (LIPITOR) 20 MG tablet TAKE 1 TABLET BY MOUTH EVERY EVENING 90 tablet 3    fenofibric acid (FIBRICOR) 45 mg CpDR Take 1 capsule (45 mg total) by mouth once daily. 90 capsule 3    ipratropium-albuteroL (COMBIVENT)  mcg/actuation inhaler Inhale 2 puffs into the lungs every 6 (six) hours as needed for Wheezing. Rescue 4 g 0    losartan (COZAAR) 50 MG tablet Take 1 tablet (50 mg total) by mouth once daily. 90 tablet 3    nicotine (NICODERM CQ) 21 mg/24 hr Place 1 patch onto the skin once daily. 14 patch 0     umeclidinium (INCRUSE ELLIPTA) 62.5 mcg/actuation inhalation capsule Inhale 62.5 mcg into the lungs once daily. Controller 30 each 0    amLODIPine (NORVASC) 10 MG tablet Take 1 tablet (10 mg total) by mouth once daily. (Patient not taking: Reported on 6/21/2022) 90 tablet 3    furosemide (LASIX) 20 MG tablet Take 1 tablet (20 mg total) by mouth once daily. (Patient not taking: Reported on 6/21/2022) 90 tablet 3     No current facility-administered medications for this visit.      Prior to Admission medications    Medication Sig Start Date End Date Taking? Authorizing Provider   atorvastatin (LIPITOR) 20 MG tablet TAKE 1 TABLET BY MOUTH EVERY EVENING 2/10/22  Yes Sina Ball MD   fenofibric acid (FIBRICOR) 45 mg CpDR Take 1 capsule (45 mg total) by mouth once daily. 2/10/22 2/10/23 Yes Sina Ball MD   ipratropium-albuteroL (COMBIVENT)  mcg/actuation inhaler Inhale 2 puffs into the lungs every 6 (six) hours as needed for Wheezing. Rescue 5/25/22  Yes Kurt Suarez MD   losartan (COZAAR) 50 MG tablet Take 1 tablet (50 mg total) by mouth once daily. 2/10/22 2/10/23 Yes Sina Ball MD   nicotine (NICODERM CQ) 21 mg/24 hr Place 1 patch onto the skin once daily. 6/21/22  Yes Nicho Peres MD   umeclidinium (INCRUSE ELLIPTA) 62.5 mcg/actuation inhalation capsule Inhale 62.5 mcg into the lungs once daily. Controller 5/25/22  Yes Kurt Suarez MD   amLODIPine (NORVASC) 10 MG tablet Take 1 tablet (10 mg total) by mouth once daily.  Patient not taking: Reported on 6/21/2022 6/3/22   Kurt Suarez MD   furosemide (LASIX) 20 MG tablet Take 1 tablet (20 mg total) by mouth once daily.  Patient not taking: Reported on 6/21/2022 6/3/22   Kurt Suarez MD   nicotine (NICODERM CQ) 21 mg/24 hr Place 1 patch onto the skin once daily. 6/7/22 6/21/22  Nicho Peres MD         History  Past Medical History:   Diagnosis Date    Asthma     Hyperlipidemia     Hypertension      Past  Surgical History:   Procedure Laterality Date    FINGER FRACTURE SURGERY       Social History     Socioeconomic History    Marital status:    Tobacco Use    Smoking status: Current Every Day Smoker     Packs/day: 1.00     Types: Cigarettes    Smokeless tobacco: Never Used   Substance and Sexual Activity    Alcohol use: Not Currently     Comment: social    Drug use: Never    Sexual activity: Not Currently   Social History Narrative    Tobacco: Initiated at age 11 yo; max use 1ppd    EtOH: None    Drug: None    Employment: Retired     Education: 1 year of college    Lives alone         2 adult children          Allergies  Review of patient's allergies indicates:   Allergen Reactions    Codeine Itching         Review of Systems   Review of Systems   Constitutional: Negative for decreased appetite, fever and weight loss.   HENT: Negative for congestion and nosebleeds.    Eyes: Negative for double vision, vision loss in left eye, vision loss in right eye and visual disturbance.   Cardiovascular: Positive for chest pain. Negative for claudication, cyanosis, dyspnea on exertion, irregular heartbeat, leg swelling, near-syncope, orthopnea, palpitations, paroxysmal nocturnal dyspnea and syncope.   Respiratory: Negative for cough, hemoptysis, shortness of breath, sleep disturbances due to breathing, snoring, sputum production and wheezing.    Endocrine: Negative for cold intolerance and heat intolerance.   Skin: Negative for nail changes and rash.   Musculoskeletal: Negative for joint pain, muscle cramps, muscle weakness and myalgias.   Gastrointestinal: Negative for change in bowel habit, heartburn, hematemesis, hematochezia, hemorrhoids and melena.   Neurological: Negative for dizziness, focal weakness and headaches.         Physical Exam  Wt Readings from Last 1 Encounters:   06/03/22 94.1 kg (207 lb 7.3 oz)     BP Readings from Last 3 Encounters:   06/03/22 128/78   05/25/22 139/86    02/10/22 124/80     Pulse Readings from Last 1 Encounters:   06/03/22 73     There is no height or weight on file to calculate BMI.    Physical Exam  Vitals reviewed.   Constitutional:       Appearance: He is well-developed. He is obese.   HENT:      Head: Atraumatic.   Eyes:      General: No scleral icterus.  Neck:      Vascular: Normal carotid pulses. No carotid bruit, hepatojugular reflux or JVD.   Cardiovascular:      Rate and Rhythm: Normal rate and regular rhythm.      Chest Wall: PMI is not displaced.      Pulses: Intact distal pulses.           Carotid pulses are 2+ on the right side and 2+ on the left side.       Radial pulses are 2+ on the right side and 2+ on the left side.        Dorsalis pedis pulses are 2+ on the right side and 2+ on the left side.      Heart sounds: Normal heart sounds, S1 normal and S2 normal. No murmur heard.    No friction rub.   Pulmonary:      Effort: Pulmonary effort is normal. No respiratory distress.      Breath sounds: Normal breath sounds. No stridor. No wheezing or rales.   Chest:      Chest wall: No tenderness.   Abdominal:      General: Bowel sounds are normal.      Palpations: Abdomen is soft.   Musculoskeletal:      Cervical back: Neck supple. No edema.   Skin:     General: Skin is warm and dry.      Nails: There is no clubbing.   Neurological:      Mental Status: He is alert and oriented to person, place, and time.   Psychiatric:         Behavior: Behavior normal.         Thought Content: Thought content normal.           Assessment  1. Abdominal aortic aneurysm (AAA) without rupture  Being evaluated    2. Hyperlipidemia, unspecified hyperlipidemia type  On medications    3. Essential hypertension  Not well controlled    4. Tobacco abuse  Improving on smoking cessation program    5. Other chest pain  Being evaluated    6.  Abnormal EKG with inferior Q-waves        Plan and Discussion  Discussed his coronary artery disease risk factors.  Discussed his chest pain with  abnormal EKG.  Will proceed with a treadmill nuclear stress test to evaluate his chest pain and abnormal EKG.  Will also proceed with an echocardiogram.  Increase losartan to 100 mg daily.  Continue aspirin 81 mg daily.  Will get an ultrasound the abdomen to evaluate his abdominal aortic aneurysm.  Continue with smoking cessation program.  Will enroll in hypertension digital medicine program.    Follow Up  1-2 months      Constantino Peres MD, F.A.C.C, F.S.C.A.I.

## 2022-06-21 NOTE — PROGRESS NOTES
Individual Follow-Up Form    6/21/2022    Quit Date:     Clinical Status of Patient: Outpatient      Continuing Medication: yes  Patches    Other Medications:      Target Symptoms: Withdrawal and medication side effects. The following were  rated moderate (3) to severe (4) on TCRS:  · Moderate (3): none  · Severe (4): none    Comments: Patient was seen in clinic today for follow up. Patient states smoking  1 ppd down from  3  cpd.  Commended patient on the accomplishment.  Patient remains on prescribed tobacco cessation medication regimen of 21 mg patch without any negative side effects at this time. Refill sent to pharmacy. Discussed changing habitual behavior  and using 15 minutes delay to break routine tobacco use.  The patient will continue with sessions and medication monitoring by CTTS.  Prescribed medication management will be by physician.  Diagnosis: F17.200    Next Visit: 2 weeks

## 2022-06-21 NOTE — Clinical Note
Patient was seen in clinic today for follow up. Patient states smoking  1 ppd down from  3  cpd.  Commended patient on the accomplishment.  Patient remains on prescribed tobacco cessation medication regimen of 21 mg patch without any negative side effects at this time. Refill sent to pharmacy. Discussed changing habitual behavior  and using 15 minutes delay to break routine tobacco use.  The patient will continue with sessions and medication monitoring by CTTS.  Prescribed medication management will be by physician.

## 2022-06-22 NOTE — ED PROVIDER NOTES
Source of History:  Patient, chart    Chief complaint:  Chest Pain (Pt c.o chest pain onset 5 days ago. Non radiating. Denies n/v.  Pt states pain comes and goes. Pt was seen yesterday at [facility]  and today for same complaint  in office. Pt was seen by cardiologist today.  AAO x 3 nadn skin w.d )      HPI:  Gurwinder Hair is a 67 y.o. male presenting with midsternal nonradiating chest pain for the past 5 days.  Patient states he was seen at [facility] yesterday with an unremarkable workup.  Patient did follow-up with PCP and cardiologist today but remain concerned and came to the ED for evaluation.  Patient states he is compliant with his losartan but he did not get his delivery of his furosemide or amlodipine from his mail order pharmacy.  Patient states he has been out for the past 2 weeks.  Patient also endorses increased belching over the past few days.  Patient denies taking anything for acid reflux but has been taking Gas-X with minimal relief.    This is the extent to the patients complaints today here in the emergency department.    ROS: As per HPI and below:  Constitutional: No fever.  No chills.  Eyes: No visual changes.  ENT: No sore throat. No ear pain    Cardiovascular:  Positive for chest pain.    Respiratory:  No cough.  No shortness of breath.  GI: No abdominal pain.  No nausea or vomiting.  Positive for increased belching  Genitourinary: No abnormal urination.  Neurologic: No headache. No focal weakness.  No numbness.  MSK: no back pain.  Integument: No rashes or lesions.  Hematologic: No easy bruising.  Endocrine: No excessive thirst or urination.    Review of patient's allergies indicates:   Allergen Reactions    Codeine Itching       PMH:  As per HPI and below:  Past Medical History:   Diagnosis Date    Asthma     Hyperlipidemia     Hypertension      Past Surgical History:   Procedure Laterality Date    FINGER FRACTURE SURGERY         Social History     Tobacco Use    Smoking status:  "Current Every Day Smoker     Packs/day: 1.00     Types: Cigarettes    Smokeless tobacco: Never Used   Substance Use Topics    Alcohol use: Not Currently     Comment: social    Drug use: Never       Physical Exam:    BP (!) 178/93   Pulse 85   Temp 98.4 °F (36.9 °C) (Oral)   Resp 20   Ht 5' 6" (1.676 m)   Wt 93.4 kg (206 lb)   SpO2 96%   BMI 33.25 kg/m²   Nursing note and vital signs reviewed.  Patient hypertensive but nontoxic appearing.  Constitutional: No acute distress.  Nontoxic  Eyes: No conjunctival injection.  Extraocular muscles are intact.  ENT: Oropharynx clear.  Cardiovascular:  Regular rate and rhythm no murmurs gallops or rubs  Chest/ Respiratory:  Clear to auscultation bilaterally without wheezing rhonchi or rales  Abdomen: Soft.  Not distended.  Nontender.  No guarding.  No rebound. Non-peritoneal.  Musculoskeletal: Good range of motion all joints.  No deformities.  Neck supple.  No meningismus.  Skin: No rashes seen.  Good turgor.  No abrasions.  No ecchymoses.  Neuro: alert and oriented x3,  no focal neurological deficits.  Psych: Appropriate, conversant    Labs that have been ordered have been independently reviewed and interpreted by myself.    Labs Reviewed   COMPREHENSIVE METABOLIC PANEL - Abnormal; Notable for the following components:       Result Value    Creatinine 1.8 (*)     Alkaline Phosphatase 53 (*)     eGFR if  44 (*)     eGFR if non  38 (*)     All other components within normal limits   CBC W/ AUTO DIFFERENTIAL   TROPONIN I   B-TYPE NATRIURETIC PEPTIDE       Imaging Results          X-Ray Chest AP Portable (Final result)  Result time 06/21/22 20:03:22    Final result by Stephan Guillermo MD (06/21/22 20:03:22)                 Impression:      No acute process.      Electronically signed by: Stephan Guillermo MD  Date:    06/21/2022  Time:    20:03             Narrative:    EXAMINATION:  XR CHEST AP PORTABLE    CLINICAL HISTORY:  Chest " Pain;    TECHNIQUE:  Single frontal view of the chest was performed.    COMPARISON:  02/15/2021.    FINDINGS:  Monitoring EKG leads are present.  The trachea is unremarkable.  The cardiomediastinal silhouette is within limits.  The hemidiaphragms unremarkable.  There is no evidence of free air beneath the hemidiaphragms.  There are no pleural effusions.  There is no evidence of a pneumothorax.  There is no evidence of pneumomediastinum.  There is a stable calcified granuloma in the right lower lung zone.  There is no focal consolidation.  The osseous structures are unremarkable.                                I decided to obtain the patient's medical records.  Summary of Medical Records:  Patient seen at and 1 cc yesterday.  I unremarkable workup.  Cardiologist today recommends outpatient stress test, echocardiogram, abdominal ultrasound and follow-up.    MDM/ Differential Dx:  Emergent evaluation of a 68 yo male presenting for upper epigastric pain and chest pain.  Patient states pain has been present for the past 5 days.  Patient seen by outside facility ED, PCP and cardiologist within the past 24 hours.  Patient states pain has continued.  Patient denies taking anything for his pain pain.  On exam pt is A&Ox3. VSS. Nonfebrile and nontoxic appearing. Breath sounds clear bilaterally. Mucous membranes pink and moist.  Tenderness to palpation to epigastric region.  No chest wall pain to palpation noted peer.  Remain abdomen soft and nontender. No rebound or guarding appreciated on exam.   BS WNL.  Pt speaking in full sentences.  Steady gait appreciated. Cap refill < 3 seconds.      Differential diagnoses include but are not limited to ischemic chest pain, angina, pulmonary embolism, aortic dissection, pericarditis, chest wall pain, pneumonia, referred pain from the abdomen, gastritis, pancreatitis, anxiety, GERD others.    I will get labs, imaging, medicate and reassess.        ED Course as of 06/21/22 2103   Tue Gómez  21, 2022 1937 CBC unremarkable.  No leukocytosis noted.  H&H stable 16.2 and 49.2. [RZ]   2017 CMP unremarkable.  Troponin negative.  BNP negative.  Chest x-ray negative for any acute abnormalities.  Patient reassessed.  Patient states feeling much better.  Patient updated on lab and imaging results.  Did order patient's home Norvasc.  Will monitor for a little longer and discharged home. [RZ]   2101 Pt reassessed.  Blood pressure improved.  Patient states he does have some mild upper epigastric pain.  Will give a dose of Pepcid prior to discharge.  Patient advised to avoid fried or greasy foods.  Avoid caffeine or coffee.  Follow-up with PCP.  Strict return to ED precautions discussed.  Patient verbalized understanding of this plan of care.  All questions and concerns addressed.   [RZ]   2102 Patient is hemodynamically stable, vital signs are normal. Discharge instructions given. Return to ED precautions discussed. Follow up as directed. Pt verbalized understanding of this plan.  Pt is stable for discharge.  [RZ]      ED Course User Index  [RZ] Maureen Whittington NP               Diagnostic Impression:    1. Chest pain, non-cardiac    2. Chest pain    3. Gastroesophageal reflux disease, unspecified whether esophagitis present    4. Essential hypertension         ED Disposition Condition    Discharge Stable          ED Prescriptions     Medication Sig Dispense Start Date End Date Auth. Provider    pantoprazole (PROTONIX) 20 MG tablet Take 1 tablet (20 mg total) by mouth once daily. 30 tablet 6/21/2022 7/21/2022 Maureen Whittington NP    amLODIPine (NORVASC) 10 MG tablet Take 1 tablet (10 mg total) by mouth once daily. 30 tablet 6/21/2022 7/21/2022 Maureen Whittington NP    furosemide (LASIX) 20 MG tablet Take 1 tablet (20 mg total) by mouth once daily. 30 tablet 6/21/2022 7/21/2022 Maureen Whittington NP        Follow-up Information     Follow up With Specialties Details Why Contact Info    Kurt Suarez MD Family  Medicine Schedule an appointment as soon as possible for a visit  As needed [address]  [address]  [place] [address]  [phone]               Maureen Whittington NP  06/21/22 2074

## 2022-06-22 NOTE — ED NOTES
Pt states epigastric pain has returned. Pt states pain is a sharp pain. Rates pains 9/10. BERNARDO Reddy notified of this. New order received.

## 2022-06-22 NOTE — DISCHARGE INSTRUCTIONS
Please start taking medication for your acid reflux.  We have given you a short-term refills on your Norvasc and Lasix due to not getting them from [facility].  Please monitor your blood pressure.  Please avoid spicy or fried foods.  Please follow-up with your cardia was for your outpatient testing as recommended. Please return to the ED for any worsening symptoms such as chest pain, shortness of breath, fever not controlled with Tylenol or ibuprofen or uncontrolled pain.      Our goal in the emergency department is to always give you outstanding care and exceptional service. You may receive a survey by mail or e-mail in the next week regarding your experience in our ED. We would greatly appreciate your completing and returning the survey. Your feedback provides us with a way to recognize our staff who give very good care and it helps us learn how to improve when your experience was below our aspiration of excellence.

## 2022-06-22 NOTE — ED NOTES
Pt states pain is much better after gi cocktail. Pt denies any complaints at this time. Pending provider recheck. Family present at bedside. Call light within reach. Will cont to monitor

## 2022-06-22 NOTE — ED NOTES
Pt c/o intermittent epigastric pain since Friday. Pt states that he was seen for the same on yesterday at [facility] and was told to take gas-x. Pt denies sob or any other complaints. Ccm/bp/o2 monitoring initiated. Ed work-up in progress

## 2022-06-23 NOTE — PROGRESS NOTES
"Subjective:       Patient ID: Gurwinder Hair is a 67 y.o. male.    Chief Complaint: Follow-up (1 week)    Mr. Gurwinder Hair is a 67 year old male, new to me, presents to the clinic for blood pressure follow up.PCP is Kurt Suarez. Medical and surgical history in addition to problem list reviewed as listed below.    Patient presents for blood pressure follow up. Patient states he has not been taking Amlodipoine. Continues to take Lasix, patient is unsure why he ever started Lasix. Patient states he has been smoking since 12 years old. Continues with smoking cessation program, he states "I am working on it." Spoke in depth with patient about smoking and abdominal aortic aneurysm. No acute concerns.      Chart Review 06/21/2022 patient seen by Dr. Peres Cardiology. Plan for a treadmill nuclear stress test to evaluate his chest pain/ abnormal EKG, and echocardiogram. Patient increased Losartan to 100 mg daily as recommended by cardiology and Continue aspirin 81 mg daily. Ultrasound of abdomen to evaluate  abdominal aortic aneurysm.    Past Medical History:   Diagnosis Date    Asthma     Hyperlipidemia     Hypertension         Past Surgical History:   Procedure Laterality Date    FINGER FRACTURE SURGERY          Family History   Problem Relation Age of Onset    No Known Problems Mother     No Known Problems Father        Social History     Tobacco Use   Smoking Status Current Every Day Smoker    Packs/day: 1.00    Types: Cigarettes   Smokeless Tobacco Never Used       Social History     Social History Narrative    Tobacco: Initiated at age 13 yo; max use 1ppd    EtOH: None    Drug: None    Employment: Retired     Education: 1 year of college    Lives alone         2 adult children        Review of patient's allergies indicates:   Allergen Reactions    Codeine Itching        Review of Systems   Constitutional: Negative.    HENT: Negative.    Eyes: Negative.    Respiratory: Negative.  "   Cardiovascular: Negative.    Gastrointestinal: Negative.    Genitourinary: Negative.    Musculoskeletal: Negative.    Neurological: Negative.    Psychiatric/Behavioral: Negative.          Objective:        Vitals:    06/27/22 0811   BP: (!) 148/73   Pulse: 82        Physical Exam  Constitutional:       General: He is not in acute distress.     Appearance: He is well-developed.   HENT:      Head: Normocephalic and atraumatic.      Right Ear: External ear normal.      Left Ear: External ear normal.   Eyes:      General: No scleral icterus.     Extraocular Movements: Extraocular movements intact.      Conjunctiva/sclera: Conjunctivae normal.   Cardiovascular:      Rate and Rhythm: Normal rate and regular rhythm.      Heart sounds: Normal heart sounds. No murmur heard.    No friction rub. No gallop.   Pulmonary:      Effort: Pulmonary effort is normal. No respiratory distress.      Breath sounds: Normal breath sounds. No wheezing or rales.   Musculoskeletal:         General: No tenderness or deformity. Normal range of motion.      Cervical back: Normal range of motion.   Skin:     General: Skin is warm and dry.      Findings: No erythema or rash.   Neurological:      Mental Status: He is alert and oriented to person, place, and time.      Cranial Nerves: No cranial nerve deficit.      Motor: No abnormal muscle tone.      Gait: Gait normal.   Psychiatric:         Behavior: Behavior normal.         Assessment:       1. Essential hypertension        Plan:       Essential hypertension   Follow up in two weeks   Document Blood Pressure in the morning and night, Record.   Restart Amlodipine 5 mg if blood pressure not within range.       Medication List with Changes/Refills   Current Medications    AMLODIPINE (NORVASC) 10 MG TABLET    Take 1 tablet (10 mg total) by mouth once daily.    ATORVASTATIN (LIPITOR) 20 MG TABLET    TAKE 1 TABLET BY MOUTH EVERY EVENING    FENOFIBRIC ACID (FIBRICOR) 45 MG CPDR    Take 1 capsule (45 mg  total) by mouth once daily.    IPRATROPIUM-ALBUTEROL (COMBIVENT)  MCG/ACTUATION INHALER    Inhale 2 puffs into the lungs every 6 (six) hours as needed for Wheezing. Rescue    LOSARTAN (COZAAR) 50 MG TABLET    Take 1 tablet (50 mg total) by mouth once daily.    NICOTINE (NICODERM CQ) 21 MG/24 HR    Place 1 patch onto the skin once daily.    PANTOPRAZOLE (PROTONIX) 20 MG TABLET    Take 1 tablet (20 mg total) by mouth once daily.    PNEUMOVAX-23 25 MCG/0.5 ML VACCINE        UMECLIDINIUM (INCRUSE ELLIPTA) 62.5 MCG/ACTUATION INHALATION CAPSULE    Inhale 62.5 mcg into the lungs once daily. Controller   Discontinued Medications    FUROSEMIDE (LASIX) 20 MG TABLET    Take 1 tablet (20 mg total) by mouth once daily.            Follow up in about 2 weeks (around 7/11/2022) for Amelia Ramirez, MSN, APRN, FNP-C.    CATA Spivey, MSN, FNP-C

## 2022-06-27 ENCOUNTER — OFFICE VISIT (OUTPATIENT)
Dept: PRIMARY CARE CLINIC | Facility: CLINIC | Age: 67
End: 2022-06-27
Payer: MEDICARE

## 2022-06-27 VITALS
OXYGEN SATURATION: 95 % | BODY MASS INDEX: 33.25 KG/M2 | HEIGHT: 66 IN | DIASTOLIC BLOOD PRESSURE: 73 MMHG | HEART RATE: 82 BPM | WEIGHT: 206.88 LBS | SYSTOLIC BLOOD PRESSURE: 148 MMHG

## 2022-06-27 DIAGNOSIS — I10 ESSENTIAL HYPERTENSION: Primary | ICD-10-CM

## 2022-06-27 PROCEDURE — 4010F ACE/ARB THERAPY RXD/TAKEN: CPT | Mod: CPTII,S$GLB,, | Performed by: NURSE PRACTITIONER

## 2022-06-27 PROCEDURE — 1159F PR MEDICATION LIST DOCUMENTED IN MEDICAL RECORD: ICD-10-PCS | Mod: CPTII,S$GLB,, | Performed by: NURSE PRACTITIONER

## 2022-06-27 PROCEDURE — 99999 PR PBB SHADOW E&M-EST. PATIENT-LVL IV: CPT | Mod: PBBFAC,,, | Performed by: NURSE PRACTITIONER

## 2022-06-27 PROCEDURE — 1101F PR PT FALLS ASSESS DOC 0-1 FALLS W/OUT INJ PAST YR: ICD-10-PCS | Mod: CPTII,S$GLB,, | Performed by: NURSE PRACTITIONER

## 2022-06-27 PROCEDURE — 3008F PR BODY MASS INDEX (BMI) DOCUMENTED: ICD-10-PCS | Mod: CPTII,S$GLB,, | Performed by: NURSE PRACTITIONER

## 2022-06-27 PROCEDURE — 99212 PR OFFICE/OUTPT VISIT, EST, LEVL II, 10-19 MIN: ICD-10-PCS | Mod: S$GLB,,, | Performed by: NURSE PRACTITIONER

## 2022-06-27 PROCEDURE — 4010F PR ACE/ARB THEARPY RXD/TAKEN: ICD-10-PCS | Mod: CPTII,S$GLB,, | Performed by: NURSE PRACTITIONER

## 2022-06-27 PROCEDURE — 1126F AMNT PAIN NOTED NONE PRSNT: CPT | Mod: CPTII,S$GLB,, | Performed by: NURSE PRACTITIONER

## 2022-06-27 PROCEDURE — 1159F MED LIST DOCD IN RCRD: CPT | Mod: CPTII,S$GLB,, | Performed by: NURSE PRACTITIONER

## 2022-06-27 PROCEDURE — 3008F BODY MASS INDEX DOCD: CPT | Mod: CPTII,S$GLB,, | Performed by: NURSE PRACTITIONER

## 2022-06-27 PROCEDURE — 1101F PT FALLS ASSESS-DOCD LE1/YR: CPT | Mod: CPTII,S$GLB,, | Performed by: NURSE PRACTITIONER

## 2022-06-27 PROCEDURE — 1126F PR PAIN SEVERITY QUANTIFIED, NO PAIN PRESENT: ICD-10-PCS | Mod: CPTII,S$GLB,, | Performed by: NURSE PRACTITIONER

## 2022-06-27 PROCEDURE — 3077F SYST BP >= 140 MM HG: CPT | Mod: CPTII,S$GLB,, | Performed by: NURSE PRACTITIONER

## 2022-06-27 PROCEDURE — 3288F FALL RISK ASSESSMENT DOCD: CPT | Mod: CPTII,S$GLB,, | Performed by: NURSE PRACTITIONER

## 2022-06-27 PROCEDURE — 99999 PR PBB SHADOW E&M-EST. PATIENT-LVL IV: ICD-10-PCS | Mod: PBBFAC,,, | Performed by: NURSE PRACTITIONER

## 2022-06-27 PROCEDURE — 99212 OFFICE O/P EST SF 10 MIN: CPT | Mod: S$GLB,,, | Performed by: NURSE PRACTITIONER

## 2022-06-27 PROCEDURE — 3288F PR FALLS RISK ASSESSMENT DOCUMENTED: ICD-10-PCS | Mod: CPTII,S$GLB,, | Performed by: NURSE PRACTITIONER

## 2022-06-27 PROCEDURE — 3078F DIAST BP <80 MM HG: CPT | Mod: CPTII,S$GLB,, | Performed by: NURSE PRACTITIONER

## 2022-06-27 PROCEDURE — 3077F PR MOST RECENT SYSTOLIC BLOOD PRESSURE >= 140 MM HG: ICD-10-PCS | Mod: CPTII,S$GLB,, | Performed by: NURSE PRACTITIONER

## 2022-06-27 PROCEDURE — 3078F PR MOST RECENT DIASTOLIC BLOOD PRESSURE < 80 MM HG: ICD-10-PCS | Mod: CPTII,S$GLB,, | Performed by: NURSE PRACTITIONER

## 2022-06-27 RX ORDER — PNEUMOCOCCAL VACCINE POLYVALENT 25; 25; 25; 25; 25; 25; 25; 25; 25; 25; 25; 25; 25; 25; 25; 25; 25; 25; 25; 25; 25; 25; 25 UG/.5ML; UG/.5ML; UG/.5ML; UG/.5ML; UG/.5ML; UG/.5ML; UG/.5ML; UG/.5ML; UG/.5ML; UG/.5ML; UG/.5ML; UG/.5ML; UG/.5ML; UG/.5ML; UG/.5ML; UG/.5ML; UG/.5ML; UG/.5ML; UG/.5ML; UG/.5ML; UG/.5ML; UG/.5ML; UG/.5ML
INJECTION, SOLUTION INTRAMUSCULAR; SUBCUTANEOUS
COMMUNITY
Start: 2022-02-10 | End: 2022-07-13

## 2022-07-05 ENCOUNTER — CLINICAL SUPPORT (OUTPATIENT)
Dept: SMOKING CESSATION | Facility: CLINIC | Age: 67
End: 2022-07-05
Payer: COMMERCIAL

## 2022-07-05 DIAGNOSIS — F17.200 NICOTINE DEPENDENCE: ICD-10-CM

## 2022-07-05 PROCEDURE — 99403 PREV MED CNSL INDIV APPRX 45: CPT | Mod: S$GLB,,,

## 2022-07-05 PROCEDURE — 99403 PR PREVENT COUNSEL,INDIV,45 MIN: ICD-10-PCS | Mod: S$GLB,,,

## 2022-07-05 NOTE — PROGRESS NOTES
Individual Follow-Up Form    7/5/2022    Quit Date:     Clinical Status of Patient: Outpatient      Continuing Medication: yes  Patches    Other Medications:      Target Symptoms: Withdrawal and medication side effects. The following were  rated moderate (3) to severe (4) on TCRS:  · Moderate (3): none  · Severe (4): none    Comments: Patient was seen in clinic today for follow up.  Patient smokes 2 cpd.  Patient smokes 10 cigarettes yesterday due to family gathering event.  Reviewed strategies, controlling environment, cues, triggers, new goals set. Introduced high risk situations with preparation interventions, caffeine similarities with withdrawal issue of habit and nicotine, alcohol, understanding urges, cravings, stress, and relaxation. Open discussion with intervention discussion.  Patient remains on prescribed tobacco cessation medication regimen of 21 mg patch without any negative side effects at this time. No refill is needed. Patient will continue bi weekly sessions.    Prescribed medication management will be by physician.      Diagnosis: F17.200    Next Visit: 2 weeks

## 2022-07-05 NOTE — Clinical Note
Patient was seen in clinic today for follow up.  Patient smokes 2 cpd.  Patient smokes 10 cigarettes yesterday due to family gathering event.  Reviewed strategies, controlling environment, cues, triggers, new goals set. Introduced high risk situations with preparation interventions, caffeine similarities with withdrawal issue of habit and nicotine, alcohol, understanding urges, cravings, stress, and relaxation. Open discussion with intervention discussion.  Patient remains on prescribed tobacco cessation medication regimen of 21 mg patch without any negative side effects at this time. No refill is needed. Patient will continue bi weekly sessions.    Prescribed medication management will be by physician.

## 2022-07-12 ENCOUNTER — HOSPITAL ENCOUNTER (OUTPATIENT)
Dept: CARDIOLOGY | Facility: OTHER | Age: 67
Discharge: HOME | End: 2022-07-12
Attending: INTERNAL MEDICINE
Payer: MEDICARE

## 2022-07-12 ENCOUNTER — HOSPITAL ENCOUNTER (OUTPATIENT)
Dept: RADIOLOGY | Facility: OTHER | Age: 67
Discharge: HOME | End: 2022-07-12
Attending: INTERNAL MEDICINE
Payer: MEDICARE

## 2022-07-12 VITALS
WEIGHT: 206 LBS | BODY MASS INDEX: 33.11 KG/M2 | SYSTOLIC BLOOD PRESSURE: 148 MMHG | HEIGHT: 66 IN | DIASTOLIC BLOOD PRESSURE: 73 MMHG

## 2022-07-12 DIAGNOSIS — R07.89 OTHER CHEST PAIN: ICD-10-CM

## 2022-07-12 DIAGNOSIS — R94.31 NONSPECIFIC ABNORMAL ELECTROCARDIOGRAM (ECG) (EKG): ICD-10-CM

## 2022-07-12 DIAGNOSIS — I71.40 ABDOMINAL AORTIC ANEURYSM (AAA) WITHOUT RUPTURE: ICD-10-CM

## 2022-07-12 LAB
ABDOMINAL INFRARENAL AORTA AP: 3.3 CM
ABDOMINAL INFRARENAL AORTA ED VEL: 0 CM/S
ABDOMINAL INFRARENAL AORTA PS VEL: 55 CM/S
ABDOMINAL INFRARENAL AORTA TRANS: 4 CM
ABDOMINAL JUXTARENAL AORTA AP: 2.2 CM
ABDOMINAL JUXTARENAL AORTA ED VEL: 8 CM/S
ABDOMINAL JUXTARENAL AORTA PS VEL: 47 CM/S
ABDOMINAL JUXTARENAL AORTA TRANS: 2.4 CM
ABDOMINAL LT COM ILIAC AP: 1.4 CM
ABDOMINAL LT COM ILIAC TRANS: 1.4 CM
ABDOMINAL LT COM ILIAC VEL: 41 CM/S
ABDOMINAL LT COM ILLIAC ED VEL: 7 CM/S
ABDOMINAL RT COM ILIAC AP: 1.4 CM
ABDOMINAL RT COM ILIAC TRANS: 1.3 CM
ABDOMINAL RT COM ILIAC VEL: 59 CM/S
ABDOMINAL RT COM ILLIAC ED VEL: 11 CM/S
ABDOMINAL SUPRARENAL AORTA AP: 2.7 CM
ABDOMINAL SUPRARENAL AORTA ED VEL: 12 CM/S
ABDOMINAL SUPRARENAL AORTA PS VEL: 46 CM/S
ABDOMINAL SUPRARENAL AORTA TRANS: 2.8 CM
ASCENDING AORTA: 3.43 CM
AV INDEX (PROSTH): 0.82
AV MEAN GRADIENT: 3 MMHG
AV PEAK GRADIENT: 4 MMHG
AV VALVE AREA: 2.89 CM2
AV VELOCITY RATIO: 0.85
BSA FOR ECHO PROCEDURE: 2.09 M2
CV ECHO LV RWT: 0.37 CM
DOP CALC AO PEAK VEL: 1.01 M/S
DOP CALC AO VTI: 23.01 CM
DOP CALC LVOT AREA: 3.5 CM2
DOP CALC LVOT DIAMETER: 2.12 CM
DOP CALC LVOT PEAK VEL: 0.86 M/S
DOP CALC LVOT STROKE VOLUME: 66.5 CM3
DOP CALCLVOT PEAK VEL VTI: 18.85 CM
E WAVE DECELERATION TIME: 151.66 MSEC
E/A RATIO: 0.51
E/E' RATIO: 7.47 M/S
ECHO LV POSTERIOR WALL: 0.81 CM (ref 0.6–1.1)
EJECTION FRACTION: 60 %
FRACTIONAL SHORTENING: 36 % (ref 28–44)
INTERVENTRICULAR SEPTUM: 0.85 CM (ref 0.6–1.1)
IVRT: 143.02 MSEC
LA MAJOR: 3.86 CM
LA MINOR: 4.97 CM
LA WIDTH: 3.35 CM
LEFT ATRIUM SIZE: 3.24 CM
LEFT ATRIUM VOLUME INDEX MOD: 18.7 ML/M2
LEFT ATRIUM VOLUME INDEX: 19.7 ML/M2
LEFT ATRIUM VOLUME MOD: 38 CM3
LEFT ATRIUM VOLUME: 40.09 CM3
LEFT INTERNAL DIMENSION IN SYSTOLE: 2.79 CM (ref 2.1–4)
LEFT VENTRICLE DIASTOLIC VOLUME INDEX: 42.34 ML/M2
LEFT VENTRICLE DIASTOLIC VOLUME: 85.96 ML
LEFT VENTRICLE MASS INDEX: 56 G/M2
LEFT VENTRICLE SYSTOLIC VOLUME INDEX: 14.5 ML/M2
LEFT VENTRICLE SYSTOLIC VOLUME: 29.37 ML
LEFT VENTRICULAR INTERNAL DIMENSION IN DIASTOLE: 4.36 CM (ref 3.5–6)
LEFT VENTRICULAR MASS: 113.16 G
LV LATERAL E/E' RATIO: 7 M/S
LV SEPTAL E/E' RATIO: 8 M/S
MV A" WAVE DURATION": 11.07 MSEC
MV PEAK A VEL: 1.1 M/S
MV PEAK E VEL: 0.56 M/S
MV STENOSIS PRESSURE HALF TIME: 43.98 MS
MV VALVE AREA P 1/2 METHOD: 5 CM2
PISA TR MAX VEL: 2.11 M/S
PULM VEIN S/D RATIO: 1.16
PV PEAK D VEL: 0.43 M/S
PV PEAK S VEL: 0.5 M/S
PV PEAK VELOCITY: 0.9 CM/S
RA MAJOR: 5.04 CM
RIGHT VENTRICULAR END-DIASTOLIC DIMENSION: 2.42 CM
SINUS: 3.14 CM
STJ: 2.75 CM
TDI LATERAL: 0.08 M/S
TDI SEPTAL: 0.07 M/S
TDI: 0.08 M/S
TR MAX PG: 18 MMHG

## 2022-07-12 PROCEDURE — 93016 CV STRESS TEST SUPVJ ONLY: CPT | Mod: ,,, | Performed by: INTERNAL MEDICINE

## 2022-07-12 PROCEDURE — 78452 STRESS TEST WITH MYOCARDIAL PERFUSION (CUPID ONLY): ICD-10-PCS | Mod: 26,,, | Performed by: INTERNAL MEDICINE

## 2022-07-12 PROCEDURE — 93018 STRESS TEST WITH MYOCARDIAL PERFUSION (CUPID ONLY): ICD-10-PCS | Mod: ,,, | Performed by: INTERNAL MEDICINE

## 2022-07-12 PROCEDURE — 93306 TTE W/DOPPLER COMPLETE: CPT | Mod: 26,,, | Performed by: INTERNAL MEDICINE

## 2022-07-12 PROCEDURE — 93016 STRESS TEST WITH MYOCARDIAL PERFUSION (CUPID ONLY): ICD-10-PCS | Mod: ,,, | Performed by: INTERNAL MEDICINE

## 2022-07-12 PROCEDURE — A9500 TC99M SESTAMIBI: HCPCS

## 2022-07-12 PROCEDURE — 93018 CV STRESS TEST I&R ONLY: CPT | Mod: ,,, | Performed by: INTERNAL MEDICINE

## 2022-07-12 PROCEDURE — 76706 CV US AAA SCREENING (CUPID ONLY): ICD-10-PCS | Mod: 26,,, | Performed by: INTERNAL MEDICINE

## 2022-07-12 PROCEDURE — 78452 HT MUSCLE IMAGE SPECT MULT: CPT | Mod: 26,,, | Performed by: INTERNAL MEDICINE

## 2022-07-12 PROCEDURE — 93306 TTE W/DOPPLER COMPLETE: CPT

## 2022-07-12 PROCEDURE — 93306 ECHO (CUPID ONLY): ICD-10-PCS | Mod: 26,,, | Performed by: INTERNAL MEDICINE

## 2022-07-12 PROCEDURE — 76706 US ABDL AORTA SCREEN AAA: CPT | Mod: 26,,, | Performed by: INTERNAL MEDICINE

## 2022-07-12 PROCEDURE — 76706 US ABDL AORTA SCREEN AAA: CPT

## 2022-07-13 ENCOUNTER — OFFICE VISIT (OUTPATIENT)
Dept: PRIMARY CARE CLINIC | Facility: CLINIC | Age: 67
End: 2022-07-13
Payer: MEDICARE

## 2022-07-13 VITALS
RESPIRATION RATE: 16 BRPM | HEIGHT: 66 IN | SYSTOLIC BLOOD PRESSURE: 152 MMHG | HEART RATE: 82 BPM | BODY MASS INDEX: 33.62 KG/M2 | OXYGEN SATURATION: 97 % | WEIGHT: 209.19 LBS | DIASTOLIC BLOOD PRESSURE: 94 MMHG

## 2022-07-13 DIAGNOSIS — K21.9 GASTROESOPHAGEAL REFLUX DISEASE WITHOUT ESOPHAGITIS: ICD-10-CM

## 2022-07-13 DIAGNOSIS — I10 ESSENTIAL HYPERTENSION: Primary | ICD-10-CM

## 2022-07-13 PROCEDURE — 4010F PR ACE/ARB THEARPY RXD/TAKEN: ICD-10-PCS | Mod: CPTII,S$GLB,, | Performed by: NURSE PRACTITIONER

## 2022-07-13 PROCEDURE — 99213 PR OFFICE/OUTPT VISIT, EST, LEVL III, 20-29 MIN: ICD-10-PCS | Mod: S$GLB,,, | Performed by: NURSE PRACTITIONER

## 2022-07-13 PROCEDURE — 3080F DIAST BP >= 90 MM HG: CPT | Mod: CPTII,S$GLB,, | Performed by: NURSE PRACTITIONER

## 2022-07-13 PROCEDURE — 4010F ACE/ARB THERAPY RXD/TAKEN: CPT | Mod: CPTII,S$GLB,, | Performed by: NURSE PRACTITIONER

## 2022-07-13 PROCEDURE — 1101F PT FALLS ASSESS-DOCD LE1/YR: CPT | Mod: CPTII,S$GLB,, | Performed by: NURSE PRACTITIONER

## 2022-07-13 PROCEDURE — 1126F PR PAIN SEVERITY QUANTIFIED, NO PAIN PRESENT: ICD-10-PCS | Mod: CPTII,S$GLB,, | Performed by: NURSE PRACTITIONER

## 2022-07-13 PROCEDURE — 1160F PR REVIEW ALL MEDS BY PRESCRIBER/CLIN PHARMACIST DOCUMENTED: ICD-10-PCS | Mod: CPTII,S$GLB,, | Performed by: NURSE PRACTITIONER

## 2022-07-13 PROCEDURE — 3008F PR BODY MASS INDEX (BMI) DOCUMENTED: ICD-10-PCS | Mod: CPTII,S$GLB,, | Performed by: NURSE PRACTITIONER

## 2022-07-13 PROCEDURE — 99213 OFFICE O/P EST LOW 20 MIN: CPT | Mod: S$GLB,,, | Performed by: NURSE PRACTITIONER

## 2022-07-13 PROCEDURE — 3288F FALL RISK ASSESSMENT DOCD: CPT | Mod: CPTII,S$GLB,, | Performed by: NURSE PRACTITIONER

## 2022-07-13 PROCEDURE — 99999 PR PBB SHADOW E&M-EST. PATIENT-LVL IV: CPT | Mod: PBBFAC,,, | Performed by: NURSE PRACTITIONER

## 2022-07-13 PROCEDURE — 3008F BODY MASS INDEX DOCD: CPT | Mod: CPTII,S$GLB,, | Performed by: NURSE PRACTITIONER

## 2022-07-13 PROCEDURE — 1126F AMNT PAIN NOTED NONE PRSNT: CPT | Mod: CPTII,S$GLB,, | Performed by: NURSE PRACTITIONER

## 2022-07-13 PROCEDURE — 99999 PR PBB SHADOW E&M-EST. PATIENT-LVL IV: ICD-10-PCS | Mod: PBBFAC,,, | Performed by: NURSE PRACTITIONER

## 2022-07-13 PROCEDURE — 3288F PR FALLS RISK ASSESSMENT DOCUMENTED: ICD-10-PCS | Mod: CPTII,S$GLB,, | Performed by: NURSE PRACTITIONER

## 2022-07-13 PROCEDURE — 3077F PR MOST RECENT SYSTOLIC BLOOD PRESSURE >= 140 MM HG: ICD-10-PCS | Mod: CPTII,S$GLB,, | Performed by: NURSE PRACTITIONER

## 2022-07-13 PROCEDURE — 1101F PR PT FALLS ASSESS DOC 0-1 FALLS W/OUT INJ PAST YR: ICD-10-PCS | Mod: CPTII,S$GLB,, | Performed by: NURSE PRACTITIONER

## 2022-07-13 PROCEDURE — 1160F RVW MEDS BY RX/DR IN RCRD: CPT | Mod: CPTII,S$GLB,, | Performed by: NURSE PRACTITIONER

## 2022-07-13 PROCEDURE — 3077F SYST BP >= 140 MM HG: CPT | Mod: CPTII,S$GLB,, | Performed by: NURSE PRACTITIONER

## 2022-07-13 PROCEDURE — 1159F PR MEDICATION LIST DOCUMENTED IN MEDICAL RECORD: ICD-10-PCS | Mod: CPTII,S$GLB,, | Performed by: NURSE PRACTITIONER

## 2022-07-13 PROCEDURE — 1159F MED LIST DOCD IN RCRD: CPT | Mod: CPTII,S$GLB,, | Performed by: NURSE PRACTITIONER

## 2022-07-13 PROCEDURE — 3080F PR MOST RECENT DIASTOLIC BLOOD PRESSURE >= 90 MM HG: ICD-10-PCS | Mod: CPTII,S$GLB,, | Performed by: NURSE PRACTITIONER

## 2022-07-13 RX ORDER — AMLODIPINE BESYLATE 10 MG/1
10 TABLET ORAL DAILY
Qty: 30 TABLET | Refills: 3 | Status: SHIPPED | OUTPATIENT
Start: 2022-07-13 | End: 2022-07-15 | Stop reason: SDUPTHER

## 2022-07-13 RX ORDER — PANTOPRAZOLE SODIUM 20 MG/1
20 TABLET, DELAYED RELEASE ORAL DAILY
Qty: 30 TABLET | Refills: 0 | Status: SHIPPED | OUTPATIENT
Start: 2022-07-13 | End: 2022-07-15 | Stop reason: SDUPTHER

## 2022-07-13 NOTE — PROGRESS NOTES
Subjective:       Patient ID: Gurwinder Hair is a 67 y.o. male.    Chief Complaint: Follow-up    Mr. Gurwinder Hair is a 67 year old male, established with me, presents to the clinic for blood pressure follow up. PCP is Kurt Suarez. Medical and surgical history in addition to problem list reviewed as listed below.     Patient presents for blood pressure follow up. Blood Pressure log for the past two weeks 128-167/.  Patient states he has not been taking Amlodipine, waiting on mail order. Continues with smoking cessation program. Currently wearing a nicotine patch on left scapula. No acute concerns.      Past Medical History:   Diagnosis Date    Asthma     Hyperlipidemia     Hypertension         Past Surgical History:   Procedure Laterality Date    FINGER FRACTURE SURGERY          Family History   Problem Relation Age of Onset    No Known Problems Mother     No Known Problems Father        Social History     Tobacco Use   Smoking Status Current Every Day Smoker    Packs/day: 1.00    Types: Cigarettes   Smokeless Tobacco Never Used       Social History     Social History Narrative    Tobacco: Initiated at age 13 yo; max use 1ppd    EtOH: None    Drug: None    Employment: Retired     Education: 1 year of college    Lives alone         2 adult children        Review of patient's allergies indicates:   Allergen Reactions    Codeine Itching        Review of Systems   Constitutional: Negative.    HENT: Negative.    Respiratory: Negative.    Cardiovascular: Negative.    Gastrointestinal: Negative.    Genitourinary: Negative.    Musculoskeletal: Negative.    Skin: Negative.    Neurological: Negative.    Psychiatric/Behavioral: Negative.          Objective:        Vitals:    07/13/22 0930   BP: (!) 152/94   Pulse: 82   Resp: 16          Physical Exam  Constitutional:       General: He is not in acute distress.     Appearance: He is well-developed.   HENT:      Head: Normocephalic and  atraumatic.      Right Ear: External ear normal.      Left Ear: External ear normal.   Eyes:      General: No scleral icterus.     Extraocular Movements: Extraocular movements intact.      Conjunctiva/sclera: Conjunctivae normal.   Cardiovascular:      Rate and Rhythm: Normal rate and regular rhythm.      Heart sounds: Normal heart sounds. No murmur heard.    No friction rub. No gallop.   Pulmonary:      Effort: Pulmonary effort is normal. No respiratory distress.      Breath sounds: Normal breath sounds. No wheezing or rales.   Musculoskeletal:         General: No tenderness or deformity. Normal range of motion.      Cervical back: Normal range of motion.   Skin:     General: Skin is warm and dry.      Findings: No erythema or rash.   Neurological:      Mental Status: He is alert and oriented to person, place, and time.      Cranial Nerves: No cranial nerve deficit.      Motor: No abnormal muscle tone.      Gait: Gait normal.   Psychiatric:         Behavior: Behavior normal.         Assessment:       1. Essential hypertension    2. Gastroesophageal reflux disease without esophagitis        Plan:       Essential hypertension   Continue to monitor and record blood pressure.    Heart Healthy Diet.  -     amlodipine (NORVASC) 10 MG tablet; Take 1 tablet (10 mg total) by mouth once daily.  Dispense: 30 tablet; Refill: 3    Gastroesophageal reflux disease without esophagitis  -     pantoprazole (PROTONIX) 20 MG tablet; Take 1 tablet (20 mg total) by mouth once daily.  Dispense: 30 tablet; Refill: 0       Medication List with Changes/Refills   Current Medications    ATORVASTATIN (LIPITOR) 20 MG TABLET    TAKE 1 TABLET BY MOUTH EVERY EVENING    FENOFIBRIC ACID (FIBRICOR) 45 MG CPDR    Take 1 capsule (45 mg total) by mouth once daily.    IPRATROPIUM-ALBUTEROL (COMBIVENT)  MCG/ACTUATION INHALER    Inhale 2 puffs into the lungs every 6 (six) hours as needed for Wheezing. Rescue    LOSARTAN (COZAAR) 50 MG TABLET    Take  1 tablet (50 mg total) by mouth once daily.    NICOTINE (NICODERM CQ) 21 MG/24 HR    Place 1 patch onto the skin once daily.   Changed and/or Refilled Medications    Modified Medication Previous Medication    AMLODIPINE (NORVASC) 10 MG TABLET amLODIPine (NORVASC) 10 MG tablet       Take 1 tablet (10 mg total) by mouth once daily.    Take 1 tablet (10 mg total) by mouth once daily.    PANTOPRAZOLE (PROTONIX) 20 MG TABLET pantoprazole (PROTONIX) 20 MG tablet       Take 1 tablet (20 mg total) by mouth once daily.    Take 1 tablet (20 mg total) by mouth once daily.   Discontinued Medications    PNEUMOVAX-23 25 MCG/0.5 ML VACCINE        UMECLIDINIUM (INCRUSE ELLIPTA) 62.5 MCG/ACTUATION INHALATION CAPSULE    Inhale 62.5 mcg into the lungs once daily. Controller            Follow up in about 2 weeks (around 7/27/2022), or if symptoms worsen or fail to improve, for Amelia Ramirez, SAIRA, APRN, FNP-C.    CATA Spivey, MSN, FNP-C

## 2022-07-14 PROBLEM — K21.9 GASTROESOPHAGEAL REFLUX DISEASE WITHOUT ESOPHAGITIS: Status: ACTIVE | Noted: 2022-07-14

## 2022-07-14 LAB
CV STRESS BASE HR: 65 BPM
DIASTOLIC BLOOD PRESSURE: 94 MMHG
NUC REST EJECTION FRACTION: 68
NUC STRESS EJECTION FRACTION: 62 %
OHS CV CPX 85 PERCENT MAX PREDICTED HEART RATE MALE: 131
OHS CV CPX ESTIMATED METS: 7
OHS CV CPX MAX PREDICTED HEART RATE: 154
OHS CV CPX PATIENT IS FEMALE: 0
OHS CV CPX PATIENT IS MALE: 1
OHS CV CPX PEAK DIASTOLIC BLOOD PRESSURE: 90 MMHG
OHS CV CPX PEAK HEAR RATE: 150 BPM
OHS CV CPX PEAK RATE PRESSURE PRODUCT: NORMAL
OHS CV CPX PEAK SYSTOLIC BLOOD PRESSURE: 178 MMHG
OHS CV CPX PERCENT MAX PREDICTED HEART RATE ACHIEVED: 97
OHS CV CPX RATE PRESSURE PRODUCT PRESENTING: 8645
STRESS ANGINA INDEX: 0
STRESS DUKE TREADMILL SCORE: 5
STRESS ECHO POST EXERCISE DUR MIN: 4 MINUTES
STRESS ECHO POST EXERCISE DUR SEC: 31 SECONDS
STRESS ST DEPRESSION: 0 MM
STRESS ST ELEVATION: 0 MM
SYSTOLIC BLOOD PRESSURE: 133 MMHG

## 2022-07-15 DIAGNOSIS — I10 ESSENTIAL HYPERTENSION: ICD-10-CM

## 2022-07-15 DIAGNOSIS — K21.9 GASTROESOPHAGEAL REFLUX DISEASE WITHOUT ESOPHAGITIS: ICD-10-CM

## 2022-07-15 RX ORDER — PANTOPRAZOLE SODIUM 20 MG/1
20 TABLET, DELAYED RELEASE ORAL DAILY
Qty: 30 TABLET | Refills: 0 | Status: SHIPPED | OUTPATIENT
Start: 2022-07-15 | End: 2022-07-15

## 2022-07-15 RX ORDER — AMLODIPINE BESYLATE 10 MG/1
10 TABLET ORAL DAILY
Qty: 30 TABLET | Refills: 3 | Status: SHIPPED | OUTPATIENT
Start: 2022-07-15 | End: 2022-10-04 | Stop reason: SDUPTHER

## 2022-07-15 NOTE — TELEPHONE ENCOUNTER
----- Message from Mary Carmen Hendrix sent at 7/15/2022 10:00 AM CDT -----  Contact: [phone]  Requesting an RX refill or new RX.  Is this a refill or new RX: refill  RX name and strength (copy/paste from chart):  amLODIPine (NORVASC) 10 MG tablet  Is this a 30 day or 90 day RX: 90 day   Pharmacy name and phone # (copy/paste from chart):    [id number] - [place] - [address] AT [facility]  [address] [place] [address]  Phone: [phone] Fax: [phone]  The doctors have asked that we provide their patients with the following 2 reminders -- prescription refills can take up to 72 hours, and a friendly reminder that in the future you can use your [facility] account to request refills: aware       Requesting an RX refill or new RX.  Is this a refill or new RX: refill  RX name and strength (copy/paste from chart):  pantoprazole (PROTONIX) 20 MG tablet  Is this a 30 day or 90 day RX: 90 day   Pharmacy name and phone # (copy/paste from chart):    [id number] - [place] [address] AT [facility] [address] [facility]  [address] [place] [address]  Phone: [phone] Fax: [phone]  The doctors have asked that we provide their patients with the following 2 reminders -- prescription refills can take up to 72 hours, and a friendly reminder that in the future you can use your [facility] account to request refills: aware         Pt advise that he was told by the pharmacy the the RX was missing the provider signature. Pt is out of these medications. Please Advise

## 2022-07-19 ENCOUNTER — CLINICAL SUPPORT (OUTPATIENT)
Dept: SMOKING CESSATION | Facility: CLINIC | Age: 67
End: 2022-07-19
Payer: COMMERCIAL

## 2022-07-19 DIAGNOSIS — F17.200 NICOTINE DEPENDENCE: ICD-10-CM

## 2022-07-19 PROCEDURE — 99403 PREV MED CNSL INDIV APPRX 45: CPT | Mod: S$GLB,,, | Performed by: FAMILY MEDICINE

## 2022-07-19 PROCEDURE — 99403 PR PREVENT COUNSEL,INDIV,45 MIN: ICD-10-PCS | Mod: S$GLB,,, | Performed by: FAMILY MEDICINE

## 2022-07-19 NOTE — Clinical Note
Patient was seen in clinic today for follow up.  Patient currently smokes 5 cigarettes per day.  Discussed changing habitual behavior  and using 15 minutes delay to break routine tobacco use.  Reminded patient to wear nicotine patch everyday   Patient remains on prescribed tobacco cessation medication regimen of 21 mg patch without any negative side effects at this time.  No refill is needed.   The patient denies any abnormal behavior or mental changes at this time. Prescribed medication management will be by physician.Patient will continue bi weekly sessions.

## 2022-07-19 NOTE — PROGRESS NOTES
Individual Follow-Up Form    7/19/2022    Quit Date:     Clinical Status of Patient: Outpatient      Continuing Medication: yes  Patches       Target Symptoms: Withdrawal and medication side effects. The following were  rated moderate (3) to severe (4) on TCRS:  · Moderate (3): none  · Severe (4): none    Comments: Patient was seen in clinic today for follow up.  Patient currently smokes 5 cigarettes per day.  Discussed changing habitual behavior  and using 15 minutes delay to break routine tobacco use.  Reminded patient to wear nicotine patch everyday   Patient remains on prescribed tobacco cessation medication regimen of 21 mg patch without any negative side effects at this time.  No refill is needed.   The patient denies any abnormal behavior or mental changes at this time. Prescribed medication management will be by physician.Patient will continue bi weekly sessions     Diagnosis: F17.200    Next Visit: 2 weeks

## 2022-07-26 ENCOUNTER — OFFICE VISIT (OUTPATIENT)
Dept: CARDIOLOGY | Facility: CLINIC | Age: 67
End: 2022-07-26
Payer: MEDICARE

## 2022-07-26 VITALS
WEIGHT: 207 LBS | DIASTOLIC BLOOD PRESSURE: 82 MMHG | OXYGEN SATURATION: 96 % | HEART RATE: 89 BPM | HEIGHT: 66 IN | BODY MASS INDEX: 33.27 KG/M2 | SYSTOLIC BLOOD PRESSURE: 124 MMHG

## 2022-07-26 DIAGNOSIS — I71.40 ABDOMINAL AORTIC ANEURYSM (AAA) WITHOUT RUPTURE: Primary | ICD-10-CM

## 2022-07-26 DIAGNOSIS — Z72.0 TOBACCO ABUSE: ICD-10-CM

## 2022-07-26 DIAGNOSIS — I10 ESSENTIAL HYPERTENSION: ICD-10-CM

## 2022-07-26 DIAGNOSIS — R07.89 OTHER CHEST PAIN: ICD-10-CM

## 2022-07-26 DIAGNOSIS — E78.5 HYPERLIPIDEMIA, UNSPECIFIED HYPERLIPIDEMIA TYPE: ICD-10-CM

## 2022-07-26 PROCEDURE — 1126F AMNT PAIN NOTED NONE PRSNT: CPT | Mod: CPTII,S$GLB,, | Performed by: INTERNAL MEDICINE

## 2022-07-26 PROCEDURE — 99214 OFFICE O/P EST MOD 30 MIN: CPT | Mod: S$GLB,,, | Performed by: INTERNAL MEDICINE

## 2022-07-26 PROCEDURE — 99999 PR PBB SHADOW E&M-EST. PATIENT-LVL III: ICD-10-PCS | Mod: PBBFAC,,, | Performed by: INTERNAL MEDICINE

## 2022-07-26 PROCEDURE — 3008F PR BODY MASS INDEX (BMI) DOCUMENTED: ICD-10-PCS | Mod: CPTII,S$GLB,, | Performed by: INTERNAL MEDICINE

## 2022-07-26 PROCEDURE — 1126F PR PAIN SEVERITY QUANTIFIED, NO PAIN PRESENT: ICD-10-PCS | Mod: CPTII,S$GLB,, | Performed by: INTERNAL MEDICINE

## 2022-07-26 PROCEDURE — 3074F PR MOST RECENT SYSTOLIC BLOOD PRESSURE < 130 MM HG: ICD-10-PCS | Mod: CPTII,S$GLB,, | Performed by: INTERNAL MEDICINE

## 2022-07-26 PROCEDURE — 1101F PT FALLS ASSESS-DOCD LE1/YR: CPT | Mod: CPTII,S$GLB,, | Performed by: INTERNAL MEDICINE

## 2022-07-26 PROCEDURE — 4010F ACE/ARB THERAPY RXD/TAKEN: CPT | Mod: CPTII,S$GLB,, | Performed by: INTERNAL MEDICINE

## 2022-07-26 PROCEDURE — 3079F DIAST BP 80-89 MM HG: CPT | Mod: CPTII,S$GLB,, | Performed by: INTERNAL MEDICINE

## 2022-07-26 PROCEDURE — 4010F PR ACE/ARB THEARPY RXD/TAKEN: ICD-10-PCS | Mod: CPTII,S$GLB,, | Performed by: INTERNAL MEDICINE

## 2022-07-26 PROCEDURE — 3074F SYST BP LT 130 MM HG: CPT | Mod: CPTII,S$GLB,, | Performed by: INTERNAL MEDICINE

## 2022-07-26 PROCEDURE — 3288F PR FALLS RISK ASSESSMENT DOCUMENTED: ICD-10-PCS | Mod: CPTII,S$GLB,, | Performed by: INTERNAL MEDICINE

## 2022-07-26 PROCEDURE — 1101F PR PT FALLS ASSESS DOC 0-1 FALLS W/OUT INJ PAST YR: ICD-10-PCS | Mod: CPTII,S$GLB,, | Performed by: INTERNAL MEDICINE

## 2022-07-26 PROCEDURE — 99999 PR PBB SHADOW E&M-EST. PATIENT-LVL III: CPT | Mod: PBBFAC,,, | Performed by: INTERNAL MEDICINE

## 2022-07-26 PROCEDURE — 3079F PR MOST RECENT DIASTOLIC BLOOD PRESSURE 80-89 MM HG: ICD-10-PCS | Mod: CPTII,S$GLB,, | Performed by: INTERNAL MEDICINE

## 2022-07-26 PROCEDURE — 3008F BODY MASS INDEX DOCD: CPT | Mod: CPTII,S$GLB,, | Performed by: INTERNAL MEDICINE

## 2022-07-26 PROCEDURE — 3288F FALL RISK ASSESSMENT DOCD: CPT | Mod: CPTII,S$GLB,, | Performed by: INTERNAL MEDICINE

## 2022-07-26 PROCEDURE — 1159F MED LIST DOCD IN RCRD: CPT | Mod: CPTII,S$GLB,, | Performed by: INTERNAL MEDICINE

## 2022-07-26 PROCEDURE — 1159F PR MEDICATION LIST DOCUMENTED IN MEDICAL RECORD: ICD-10-PCS | Mod: CPTII,S$GLB,, | Performed by: INTERNAL MEDICINE

## 2022-07-26 PROCEDURE — 99214 PR OFFICE/OUTPT VISIT, EST, LEVL IV, 30-39 MIN: ICD-10-PCS | Mod: S$GLB,,, | Performed by: INTERNAL MEDICINE

## 2022-07-26 NOTE — PROGRESS NOTES
Cardiology    7/26/2022  11:07 AM    Problem list  Patient Active Problem List   Diagnosis    Other closed fractures of distal end of radius (alone)    Essential hypertension    Hyperlipidemia    Stage 3b chronic kidney disease    Tobacco abuse    History of bacterial pneumonia    History of solitary pulmonary nodule    Abdominal aortic aneurysm (AAA) without rupture    COPD, severity to be determined    CRI (chronic renal insufficiency)    Other chest pain    Gastroesophageal reflux disease without esophagitis       CC:  Follow-up    HPI:  He is doing well.  He is here for results.  We discussed his negative nuclear stress test.  We discussed his echocardiogram showed good left ventricular function no valvular abnormalities.  His abdominal ultrasound showed a small infrarenal aortic aneurysm.  He is still smoking.  His blood pressure is not well controlled since taking the amlodipine.    Medications  Current Outpatient Medications   Medication Sig Dispense Refill    amLODIPine (NORVASC) 10 MG tablet Take 1 tablet (10 mg total) by mouth once daily. 30 tablet 3    atorvastatin (LIPITOR) 20 MG tablet TAKE 1 TABLET BY MOUTH EVERY EVENING 90 tablet 3    losartan (COZAAR) 50 MG tablet Take 1 tablet (50 mg total) by mouth once daily. (Patient taking differently: Take 100 mg by mouth once daily.) 90 tablet 3    nicotine (NICODERM CQ) 21 mg/24 hr Place 1 patch onto the skin once daily. 14 patch 0    ipratropium-albuteroL (COMBIVENT)  mcg/actuation inhaler Inhale 2 puffs into the lungs every 6 (six) hours as needed for Wheezing. Rescue (Patient not taking: Reported on 7/26/2022) 4 g 0    pantoprazole (PROTONIX) 20 MG tablet TAKE 1 TABLET(20 MG) BY MOUTH EVERY DAY (Patient not taking: Reported on 7/26/2022) 90 tablet 0     No current facility-administered medications for this visit.      Prior to Admission medications    Medication Sig Start Date End Date Taking? Authorizing Provider    amLODIPine (NORVASC) 10 MG tablet Take 1 tablet (10 mg total) by mouth once daily. 7/15/22 11/12/22 Yes Amelia Ramirez NP   atorvastatin (LIPITOR) 20 MG tablet TAKE 1 TABLET BY MOUTH EVERY EVENING 2/10/22  Yes Sina Ball MD   losartan (COZAAR) 50 MG tablet Take 1 tablet (50 mg total) by mouth once daily.  Patient taking differently: Take 100 mg by mouth once daily. 2/10/22 2/10/23 Yes Sina Ball MD   nicotine (NICODERM CQ) 21 mg/24 hr Place 1 patch onto the skin once daily. 6/21/22  Yes Nicho Peres MD   ipratropium-albuteroL (COMBIVENT)  mcg/actuation inhaler Inhale 2 puffs into the lungs every 6 (six) hours as needed for Wheezing. Rescue  Patient not taking: Reported on 7/26/2022 5/25/22   Kurt Suarez MD   pantoprazole (PROTONIX) 20 MG tablet TAKE 1 TABLET(20 MG) BY MOUTH EVERY DAY  Patient not taking: Reported on 7/26/2022 7/15/22 10/13/22  Amelia Ramirez NP   fenofibric acid (FIBRICOR) 45 mg CpDR Take 1 capsule (45 mg total) by mouth once daily. 2/10/22 7/26/22  Sina Ball MD         History  Past Medical History:   Diagnosis Date    Asthma     Hyperlipidemia     Hypertension      Past Surgical History:   Procedure Laterality Date    FINGER FRACTURE SURGERY       Social History     Socioeconomic History    Marital status:    Tobacco Use    Smoking status: Current Every Day Smoker     Packs/day: 1.00     Types: Cigarettes    Smokeless tobacco: Never Used   Substance and Sexual Activity    Alcohol use: Not Currently     Comment: social    Drug use: Never    Sexual activity: Not Currently   Social History Narrative    Tobacco: Initiated at age 13 yo; max use 1ppd    EtOH: None    Drug: None    Employment: Retired     Education: 1 year of college    Lives alone         2 adult children          Allergies  Review of patient's allergies indicates:   Allergen Reactions    Codeine Itching         Review of Systems   Review of Systems    Constitutional: Negative for decreased appetite, fever and weight loss.   HENT: Negative for congestion and nosebleeds.    Eyes: Negative for double vision, vision loss in left eye, vision loss in right eye and visual disturbance.   Cardiovascular: Negative for chest pain, claudication, cyanosis, dyspnea on exertion, irregular heartbeat, leg swelling, near-syncope, orthopnea, palpitations, paroxysmal nocturnal dyspnea and syncope.   Respiratory: Negative for cough, hemoptysis, shortness of breath, sleep disturbances due to breathing, snoring, sputum production and wheezing.    Endocrine: Negative for cold intolerance and heat intolerance.   Skin: Negative for nail changes and rash.   Musculoskeletal: Negative for joint pain, muscle cramps, muscle weakness and myalgias.   Gastrointestinal: Negative for change in bowel habit, heartburn, hematemesis, hematochezia, hemorrhoids and melena.   Neurological: Negative for dizziness, focal weakness and headaches.         Physical Exam  Wt Readings from Last 1 Encounters:   07/26/22 93.9 kg (207 lb 0.2 oz)     BP Readings from Last 3 Encounters:   07/26/22 124/82   07/13/22 (!) 152/94   07/12/22 (!) 148/73     Pulse Readings from Last 1 Encounters:   07/26/22 89     Body mass index is 33.41 kg/m².    Physical Exam  Vitals reviewed.   Constitutional:       Appearance: He is well-developed. He is obese.   HENT:      Head: Atraumatic.   Eyes:      General: No scleral icterus.  Neck:      Vascular: Normal carotid pulses. No carotid bruit, hepatojugular reflux or JVD.   Cardiovascular:      Rate and Rhythm: Normal rate and regular rhythm.      Chest Wall: PMI is not displaced.      Pulses: Intact distal pulses.           Carotid pulses are 2+ on the right side and 2+ on the left side.       Radial pulses are 2+ on the right side and 2+ on the left side.        Dorsalis pedis pulses are 2+ on the right side and 2+ on the left side.      Heart sounds: Normal heart sounds, S1 normal  and S2 normal. No murmur heard.    No friction rub.   Pulmonary:      Effort: Pulmonary effort is normal. No respiratory distress.      Breath sounds: Normal breath sounds. No stridor. No wheezing or rales.   Chest:      Chest wall: No tenderness.   Abdominal:      General: Bowel sounds are normal.      Palpations: Abdomen is soft.   Musculoskeletal:      Cervical back: Neck supple. No edema.   Skin:     General: Skin is warm and dry.      Nails: There is no clubbing.   Neurological:      Mental Status: He is alert and oriented to person, place, and time.   Psychiatric:         Behavior: Behavior normal.         Thought Content: Thought content normal.             Assessment  1. Abdominal aortic aneurysm (AAA) without rupture  Stable    2. Other chest pain  Normal stress test    3. Hyperlipidemia, unspecified hyperlipidemia type  On statin  - Comprehensive Metabolic Panel; Future  - Lipid Panel; Future    4. Essential hypertension  Well controlled  - Hypertension Digital Medicine (HDMP) Enrollment Order  - Lipids Digital Medicine (LDMP) Enrollment Order  - Lipids Digital Medicine (LDMP): Assign Onboarding Questionnaires    5. Tobacco abuse  Unchanged        Plan and Discussion  Continue current medications.  Encouraged to quit smoking.  Encourage to exercise at least 30 minutes per day, 5 days per week.  Enroll in hypertension digital medicine.      Follow Up  6 months with labs      Constantino Peres MD, F.A.C.C, F.S.C.A.I.

## 2022-07-27 ENCOUNTER — OFFICE VISIT (OUTPATIENT)
Dept: PRIMARY CARE CLINIC | Facility: CLINIC | Age: 67
End: 2022-07-27
Payer: MEDICARE

## 2022-07-27 VITALS
OXYGEN SATURATION: 94 % | HEIGHT: 66 IN | WEIGHT: 207.44 LBS | HEART RATE: 88 BPM | SYSTOLIC BLOOD PRESSURE: 127 MMHG | DIASTOLIC BLOOD PRESSURE: 87 MMHG | RESPIRATION RATE: 16 BRPM | BODY MASS INDEX: 33.34 KG/M2 | TEMPERATURE: 98 F

## 2022-07-27 DIAGNOSIS — I10 ESSENTIAL HYPERTENSION: Primary | ICD-10-CM

## 2022-07-27 DIAGNOSIS — Z72.0 CURRENTLY ATTEMPTING TO QUIT SMOKING: ICD-10-CM

## 2022-07-27 PROCEDURE — 3008F BODY MASS INDEX DOCD: CPT | Mod: CPTII,S$GLB,, | Performed by: NURSE PRACTITIONER

## 2022-07-27 PROCEDURE — 3008F PR BODY MASS INDEX (BMI) DOCUMENTED: ICD-10-PCS | Mod: CPTII,S$GLB,, | Performed by: NURSE PRACTITIONER

## 2022-07-27 PROCEDURE — 99212 OFFICE O/P EST SF 10 MIN: CPT | Mod: S$GLB,,, | Performed by: NURSE PRACTITIONER

## 2022-07-27 PROCEDURE — 4010F ACE/ARB THERAPY RXD/TAKEN: CPT | Mod: CPTII,S$GLB,, | Performed by: NURSE PRACTITIONER

## 2022-07-27 PROCEDURE — 1126F AMNT PAIN NOTED NONE PRSNT: CPT | Mod: CPTII,S$GLB,, | Performed by: NURSE PRACTITIONER

## 2022-07-27 PROCEDURE — 3079F DIAST BP 80-89 MM HG: CPT | Mod: CPTII,S$GLB,, | Performed by: NURSE PRACTITIONER

## 2022-07-27 PROCEDURE — 99999 PR PBB SHADOW E&M-EST. PATIENT-LVL IV: ICD-10-PCS | Mod: PBBFAC,,, | Performed by: NURSE PRACTITIONER

## 2022-07-27 PROCEDURE — 3079F PR MOST RECENT DIASTOLIC BLOOD PRESSURE 80-89 MM HG: ICD-10-PCS | Mod: CPTII,S$GLB,, | Performed by: NURSE PRACTITIONER

## 2022-07-27 PROCEDURE — 1159F PR MEDICATION LIST DOCUMENTED IN MEDICAL RECORD: ICD-10-PCS | Mod: CPTII,S$GLB,, | Performed by: NURSE PRACTITIONER

## 2022-07-27 PROCEDURE — 3074F PR MOST RECENT SYSTOLIC BLOOD PRESSURE < 130 MM HG: ICD-10-PCS | Mod: CPTII,S$GLB,, | Performed by: NURSE PRACTITIONER

## 2022-07-27 PROCEDURE — 1126F PR PAIN SEVERITY QUANTIFIED, NO PAIN PRESENT: ICD-10-PCS | Mod: CPTII,S$GLB,, | Performed by: NURSE PRACTITIONER

## 2022-07-27 PROCEDURE — 1101F PT FALLS ASSESS-DOCD LE1/YR: CPT | Mod: CPTII,S$GLB,, | Performed by: NURSE PRACTITIONER

## 2022-07-27 PROCEDURE — 99999 PR PBB SHADOW E&M-EST. PATIENT-LVL IV: CPT | Mod: PBBFAC,,, | Performed by: NURSE PRACTITIONER

## 2022-07-27 PROCEDURE — 1101F PR PT FALLS ASSESS DOC 0-1 FALLS W/OUT INJ PAST YR: ICD-10-PCS | Mod: CPTII,S$GLB,, | Performed by: NURSE PRACTITIONER

## 2022-07-27 PROCEDURE — 3288F FALL RISK ASSESSMENT DOCD: CPT | Mod: CPTII,S$GLB,, | Performed by: NURSE PRACTITIONER

## 2022-07-27 PROCEDURE — 4010F PR ACE/ARB THEARPY RXD/TAKEN: ICD-10-PCS | Mod: CPTII,S$GLB,, | Performed by: NURSE PRACTITIONER

## 2022-07-27 PROCEDURE — 1159F MED LIST DOCD IN RCRD: CPT | Mod: CPTII,S$GLB,, | Performed by: NURSE PRACTITIONER

## 2022-07-27 PROCEDURE — 3288F PR FALLS RISK ASSESSMENT DOCUMENTED: ICD-10-PCS | Mod: CPTII,S$GLB,, | Performed by: NURSE PRACTITIONER

## 2022-07-27 PROCEDURE — 99212 PR OFFICE/OUTPT VISIT, EST, LEVL II, 10-19 MIN: ICD-10-PCS | Mod: S$GLB,,, | Performed by: NURSE PRACTITIONER

## 2022-07-27 PROCEDURE — 3074F SYST BP LT 130 MM HG: CPT | Mod: CPTII,S$GLB,, | Performed by: NURSE PRACTITIONER

## 2022-07-27 NOTE — PROGRESS NOTES
Subjective:       Patient ID: Gurwinder Hair is a 67 y.o. male.    Chief Complaint: Follow-up    Mr. Gurwinder Hair is a 67 year old male, established with me, presents to the clinic for blood pressure follow up. PCP is Kurt Suarez. Medical and surgical history in addition to problem list reviewed as listed below.     Patient presents for blood pressure follow up. Patient states he has been taking Amlodipine, prescribed to his local pharmacy. He states he is still waiting on mail order. Continues with smoking cessation program. Currently wearing a nicotine patch on right scapula. Patient states he has cut down from smoking a pack a day to five to six a day. Encouraged to initiate exercise regimen 3-5 days a week at 30 minute intervals. No acute concerns.      Past Medical History:   Diagnosis Date    Asthma     Hyperlipidemia     Hypertension         Past Surgical History:   Procedure Laterality Date    FINGER FRACTURE SURGERY          Family History   Problem Relation Age of Onset    No Known Problems Mother     No Known Problems Father        Social History     Tobacco Use   Smoking Status Current Every Day Smoker    Packs/day: 1.00    Types: Cigarettes   Smokeless Tobacco Never Used       Social History     Social History Narrative    Tobacco: Initiated at age 11 yo; max use 1ppd    EtOH: None    Drug: None    Employment: Retired     Education: 1 year of college    Lives alone         2 adult children        Review of patient's allergies indicates:   Allergen Reactions    Codeine Itching        Review of Systems   Constitutional: Negative.    HENT: Negative.    Respiratory: Negative.    Gastrointestinal: Negative.    Musculoskeletal: Negative.    Skin: Negative.    Neurological: Negative.    Psychiatric/Behavioral: Negative.          Objective:        Vitals:    07/27/22 1357   BP: 127/87   Pulse: 88   Resp: 16   Temp: 98.4 °F (36.9 °C)        Physical Exam  Constitutional:        General: He is not in acute distress.     Appearance: He is well-developed.   HENT:      Head: Normocephalic and atraumatic.      Right Ear: External ear normal.      Left Ear: External ear normal.   Eyes:      General: No scleral icterus.     Extraocular Movements: Extraocular movements intact.      Conjunctiva/sclera: Conjunctivae normal.   Cardiovascular:      Rate and Rhythm: Normal rate and regular rhythm.      Heart sounds: Normal heart sounds. No murmur heard.    No friction rub. No gallop.   Pulmonary:      Effort: Pulmonary effort is normal. No respiratory distress.      Breath sounds: Normal breath sounds. No wheezing or rales.   Musculoskeletal:         General: No tenderness or deformity. Normal range of motion.      Cervical back: Normal range of motion.   Skin:     General: Skin is warm and dry.      Findings: No erythema or rash.   Neurological:      Mental Status: He is alert and oriented to person, place, and time.      Cranial Nerves: No cranial nerve deficit.      Motor: No abnormal muscle tone.      Gait: Gait normal.   Psychiatric:         Behavior: Behavior normal.         Assessment:       1. Essential hypertension    2. Currently attempting to quit smoking        Plan:       Essential hypertension   Continue Amlodipine.   Continue Losartan.   Incorporate Exercise Regimen three to five days a week for thirty minute intervals, for a goal of five days a week.   Follow up in three months.    Currently attempting to quit smoking       Medication List with Changes/Refills   Current Medications    AMLODIPINE (NORVASC) 10 MG TABLET    Take 1 tablet (10 mg total) by mouth once daily.    ATORVASTATIN (LIPITOR) 20 MG TABLET    TAKE 1 TABLET BY MOUTH EVERY EVENING    IPRATROPIUM-ALBUTEROL (COMBIVENT)  MCG/ACTUATION INHALER    Inhale 2 puffs into the lungs every 6 (six) hours as needed for Wheezing. Rescue    LOSARTAN (COZAAR) 50 MG TABLET    Take 1 tablet (50 mg total) by mouth once daily.     NICOTINE (NICODERM CQ) 21 MG/24 HR    Place 1 patch onto the skin once daily.    PANTOPRAZOLE (PROTONIX) 20 MG TABLET    TAKE 1 TABLET(20 MG) BY MOUTH EVERY DAY            Follow up in 3 months (on 10/27/2022), or if symptoms worsen or fail to improve, for Amelia Ramirez, MSN, APRN, FNP-C.    Amelia Ramirez APRN, MSN, FNP-C

## 2022-08-02 ENCOUNTER — CLINICAL SUPPORT (OUTPATIENT)
Dept: SMOKING CESSATION | Facility: CLINIC | Age: 67
End: 2022-08-02
Payer: COMMERCIAL

## 2022-08-02 DIAGNOSIS — F17.200 NICOTINE DEPENDENCE: Primary | ICD-10-CM

## 2022-08-02 PROCEDURE — 99403 PREV MED CNSL INDIV APPRX 45: CPT | Mod: S$GLB,,, | Performed by: FAMILY MEDICINE

## 2022-08-02 PROCEDURE — 99403 PR PREVENT COUNSEL,INDIV,45 MIN: ICD-10-PCS | Mod: S$GLB,,, | Performed by: FAMILY MEDICINE

## 2022-08-02 RX ORDER — IBUPROFEN 200 MG
1 TABLET ORAL DAILY
Qty: 14 PATCH | Refills: 0 | Status: SHIPPED | OUTPATIENT
Start: 2022-08-02 | End: 2022-08-16 | Stop reason: SDUPTHER

## 2022-08-02 NOTE — PROGRESS NOTES
Individual Follow-Up Form    8/2/2022    Quit Date:     Clinical Status of Patient: Outpatient      Continuing Medication: yes  Patches       Target Symptoms: Withdrawal and medication side effects. The following were  rated moderate (3) to severe (4) on TCRS:  · Moderate (3): none  · Severe (4): none    Comments: Patient was seen in clinic today for follow up.  Patient states smoking 3  cpd down from 5   cpd. Commended patient on the accomplishment. Patient was informed about Chantix.  Patient does not want to use Chantix.  Patient remains on prescribed tobacco cessation medication regimen of 21 mg patch without any negative side effects at this time.  Refill sent to pharmacy.   The patient denies any abnormal behavior or mental changes at this time.  Patient's goal for the next two week is not to buy cigarettes.   Completion of TCRS (Tobacco Cessation Rating Scale) reviewed strategies, habitual behavior, stress, and high-risk situations. Introduced stress with addition interventions, SOLVE, relaxation with interventions, nutrition, exercise, weight gain, and the importance of rewarding oneself for accomplishments toward becoming  tobacco free. Open discussion of all items with interventionsPrescribed medication management will be by physician.  Patient will continue bi weekly sessions.       Diagnosis: F17.200    Next Visit: 2 weeks

## 2022-08-02 NOTE — Clinical Note
Patient was seen in clinic today for follow up.  Patient states smoking 3  cpd down from 5   cpd. Commended patient on the accomplishment. Patient was informed about Chantix.  Patient does not want to use Chantix.  Patient remains on prescribed tobacco cessation medication regimen of 21 mg patch without any negative side effects at this time.  Refill sent to pharmacy.  The patient denies any abnormal behavior or mental changes at this time.  Patient's goal for the next two week is not to buy cigarettes.  Completion of TCRS (Tobacco Cessation Rating Scale) reviewed strategies, habitual behavior, stress, and high-risk situations. Introduced stress with addition interventions, SOLVE, relaxation with interventions, nutrition, exercise, weight gain, and the importance of rewarding oneself for accomplishments toward becoming  tobacco free. Open discussion of all items with interventionsPrescribed medication management will be by physician.  Patient will continue bi weekly sessions.

## 2022-08-16 ENCOUNTER — CLINICAL SUPPORT (OUTPATIENT)
Dept: SMOKING CESSATION | Facility: CLINIC | Age: 67
End: 2022-08-16
Payer: COMMERCIAL

## 2022-08-16 DIAGNOSIS — F17.200 NICOTINE DEPENDENCE: Primary | ICD-10-CM

## 2022-08-16 PROCEDURE — 99404 PR PREVENT COUNSEL,INDIV,60 MIN: ICD-10-PCS | Mod: S$GLB,,,

## 2022-08-16 PROCEDURE — 99404 PREV MED CNSL INDIV APPRX 60: CPT | Mod: S$GLB,,,

## 2022-08-16 RX ORDER — IBUPROFEN 200 MG
1 TABLET ORAL DAILY
Qty: 14 PATCH | Refills: 0 | Status: SHIPPED | OUTPATIENT
Start: 2022-08-16 | End: 2022-09-27 | Stop reason: SDUPTHER

## 2022-08-16 NOTE — PROGRESS NOTES
Individual Follow-Up Form    8/16/2022    Quit Date:    Clinical Status of Patient: Outpatient      Continuing Medication: yes  Patches       Target Symptoms: Withdrawal and medication side effects. The following were  rated moderate (3) to severe (4) on TCRS:  · Moderate (3): none  · Severe (4): none    Comments: Patient was seen in clinic today for follow up.  Patient continues  to smoke  3 cigarettes per day.  Patient remains on prescribed tobacco cessation medication regimen of 21 mg patch without any negative side effects at this time.  Refill sent to pharmacy.  Patient does not use nicotine patch every day.  Reminded patient the importance of wear nicotine patch everyday to achieve quit.  Patient also states that the patch came off.  Informed patient about Coban tape and a sample was given.  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  Reviewed strategies to manage unexpected difficulties. We discussed ambivalence and willingness to quit. Prescribed medication management will be by physician. Patient will continue with sessions and medication monitoring by CTTS.        Diagnosis: F17.200       Next Visit: 2 weeks

## 2022-08-30 ENCOUNTER — CLINICAL SUPPORT (OUTPATIENT)
Dept: SMOKING CESSATION | Facility: CLINIC | Age: 67
End: 2022-08-30
Payer: COMMERCIAL

## 2022-08-30 DIAGNOSIS — I10 ESSENTIAL HYPERTENSION: Primary | ICD-10-CM

## 2022-08-30 DIAGNOSIS — F17.200 NICOTINE DEPENDENCE: Primary | ICD-10-CM

## 2022-08-30 PROCEDURE — 99404 PR PREVENT COUNSEL,INDIV,60 MIN: ICD-10-PCS | Mod: S$GLB,,,

## 2022-08-30 PROCEDURE — 99404 PREV MED CNSL INDIV APPRX 60: CPT | Mod: S$GLB,,,

## 2022-08-30 RX ORDER — LOSARTAN POTASSIUM 50 MG/1
50 TABLET ORAL DAILY
Qty: 90 TABLET | Refills: 3 | Status: CANCELLED | OUTPATIENT
Start: 2022-08-30 | End: 2023-08-30

## 2022-08-30 NOTE — Clinical Note
Patient was seen in clinic today for follow up.  Patient continues  to smoke 3 cigarettes per day.   Patient states he did not  21 mg nicotine patch since last visit.  Patient states he did not use nicotine patch daily.  Encouraged patient to use nicotine patch daily.  Patient also states that his skin is itchy.  Informed patient about Flonase.  Patient remains on prescribed tobacco cessation medication regimen of 21 mg nicotine  patch.  No refill is needed.  Patient bought a pack of cigarettes since last visit.  Encouraged patient to try a rate reduction to about  2 cpd for next two weeks.  Discussed changing habitual behavior  and using 15 minutes delay to break routine tobacco use.  Reviewed strategies to manage unexpected difficulties.  Patient will continue bi weekly sessions.

## 2022-08-30 NOTE — PROGRESS NOTES
Individual Follow-Up Form    8/30/2022    Quit Date:     Clinical Status of Patient: Outpatient      Continuing Medication: yes  Patches       Target Symptoms: Withdrawal and medication side effects. The following were  rated moderate (3) to severe (4) on TCRS:  Moderate (3): none  Severe (4): none    Comments: Patient was seen in clinic today for follow up.  Patient continues  to smoke 3 cigarettes per day.   Patient states he did not  21 mg nicotine patch since last visit.  Patient states he did not use nicotine patch daily.  Encouraged patient to use nicotine patch daily.  Patient also states that his skin is itchy.  Informed patient about Flonase.  Patient remains on prescribed tobacco cessation medication regimen of 21 mg nicotine  patch.  No refill is needed.  Patient bought a pack of cigarettes since last visit.  Encouraged patient to try a rate reduction to about  2 cpd for next two weeks.  Discussed changing habitual behavior  and using 15 minutes delay to break routine tobacco use.  Reviewed strategies to manage unexpected difficulties.  Patient will continue bi weekly sessions.      Diagnosis: F17.200    Next Visit: 2 weeks

## 2022-08-30 NOTE — TELEPHONE ENCOUNTER
----- Message from Tam Seymour sent at 8/30/2022 12:20 PM CDT -----  Regarding: Med Refill  Contact: Mr. Gurwinder Hair [phone]  Good Afternoon    Above patient is requesting a Rx refill on losartan (COZAAR) 50 MG tablet 90 tablet to be sent to [facility] [place], [place]. Can some one please assist patient?

## 2022-09-02 ENCOUNTER — TELEPHONE (OUTPATIENT)
Dept: PEDIATRICS | Facility: CLINIC | Age: 67
End: 2022-09-02
Payer: MEDICARE

## 2022-09-02 RX ORDER — LOSARTAN POTASSIUM 50 MG/1
50 TABLET ORAL DAILY
Qty: 90 TABLET | Refills: 3 | Status: CANCELLED | OUTPATIENT
Start: 2022-09-02 | End: 2023-09-02

## 2022-09-02 NOTE — TELEPHONE ENCOUNTER
----- Message from Diana Alonso sent at 9/2/2022 10:42 AM CDT -----  Contact: Ashley Dixon "[employer] Cross phone [phone] ext [phone]  Calling to speak with the nurse regarding the patients recent hospital stay. Please call her. Thanks.

## 2022-09-06 ENCOUNTER — OFFICE VISIT (OUTPATIENT)
Dept: PRIMARY CARE CLINIC | Facility: CLINIC | Age: 67
End: 2022-09-06
Payer: MEDICARE

## 2022-09-06 VITALS
SYSTOLIC BLOOD PRESSURE: 117 MMHG | DIASTOLIC BLOOD PRESSURE: 76 MMHG | HEIGHT: 66 IN | WEIGHT: 214.81 LBS | HEART RATE: 77 BPM | OXYGEN SATURATION: 96 % | BODY MASS INDEX: 34.52 KG/M2

## 2022-09-06 DIAGNOSIS — I71.40 ABDOMINAL AORTIC ANEURYSM (AAA) WITHOUT RUPTURE: ICD-10-CM

## 2022-09-06 DIAGNOSIS — N18.32 STAGE 3B CHRONIC KIDNEY DISEASE: ICD-10-CM

## 2022-09-06 DIAGNOSIS — E78.5 HYPERLIPIDEMIA, UNSPECIFIED HYPERLIPIDEMIA TYPE: Chronic | ICD-10-CM

## 2022-09-06 DIAGNOSIS — Z72.0 TOBACCO ABUSE: Chronic | ICD-10-CM

## 2022-09-06 DIAGNOSIS — Z87.891 PERSONAL HISTORY OF NICOTINE DEPENDENCE: Chronic | ICD-10-CM

## 2022-09-06 DIAGNOSIS — I10 ESSENTIAL HYPERTENSION: Primary | Chronic | ICD-10-CM

## 2022-09-06 PROCEDURE — 3008F BODY MASS INDEX DOCD: CPT | Mod: CPTII,S$GLB,, | Performed by: FAMILY MEDICINE

## 2022-09-06 PROCEDURE — 3008F PR BODY MASS INDEX (BMI) DOCUMENTED: ICD-10-PCS | Mod: CPTII,S$GLB,, | Performed by: FAMILY MEDICINE

## 2022-09-06 PROCEDURE — 4010F ACE/ARB THERAPY RXD/TAKEN: CPT | Mod: CPTII,S$GLB,, | Performed by: FAMILY MEDICINE

## 2022-09-06 PROCEDURE — 1101F PT FALLS ASSESS-DOCD LE1/YR: CPT | Mod: CPTII,S$GLB,, | Performed by: FAMILY MEDICINE

## 2022-09-06 PROCEDURE — 3074F SYST BP LT 130 MM HG: CPT | Mod: CPTII,S$GLB,, | Performed by: FAMILY MEDICINE

## 2022-09-06 PROCEDURE — 1126F PR PAIN SEVERITY QUANTIFIED, NO PAIN PRESENT: ICD-10-PCS | Mod: CPTII,S$GLB,, | Performed by: FAMILY MEDICINE

## 2022-09-06 PROCEDURE — 1126F AMNT PAIN NOTED NONE PRSNT: CPT | Mod: CPTII,S$GLB,, | Performed by: FAMILY MEDICINE

## 2022-09-06 PROCEDURE — 1159F PR MEDICATION LIST DOCUMENTED IN MEDICAL RECORD: ICD-10-PCS | Mod: CPTII,S$GLB,, | Performed by: FAMILY MEDICINE

## 2022-09-06 PROCEDURE — 99999 PR PBB SHADOW E&M-EST. PATIENT-LVL IV: CPT | Mod: PBBFAC,,, | Performed by: FAMILY MEDICINE

## 2022-09-06 PROCEDURE — 4010F PR ACE/ARB THEARPY RXD/TAKEN: ICD-10-PCS | Mod: CPTII,S$GLB,, | Performed by: FAMILY MEDICINE

## 2022-09-06 PROCEDURE — 3288F PR FALLS RISK ASSESSMENT DOCUMENTED: ICD-10-PCS | Mod: CPTII,S$GLB,, | Performed by: FAMILY MEDICINE

## 2022-09-06 PROCEDURE — 99214 OFFICE O/P EST MOD 30 MIN: CPT | Mod: S$GLB,,, | Performed by: FAMILY MEDICINE

## 2022-09-06 PROCEDURE — 1101F PR PT FALLS ASSESS DOC 0-1 FALLS W/OUT INJ PAST YR: ICD-10-PCS | Mod: CPTII,S$GLB,, | Performed by: FAMILY MEDICINE

## 2022-09-06 PROCEDURE — 99214 PR OFFICE/OUTPT VISIT, EST, LEVL IV, 30-39 MIN: ICD-10-PCS | Mod: S$GLB,,, | Performed by: FAMILY MEDICINE

## 2022-09-06 PROCEDURE — 3074F PR MOST RECENT SYSTOLIC BLOOD PRESSURE < 130 MM HG: ICD-10-PCS | Mod: CPTII,S$GLB,, | Performed by: FAMILY MEDICINE

## 2022-09-06 PROCEDURE — 99999 PR PBB SHADOW E&M-EST. PATIENT-LVL IV: ICD-10-PCS | Mod: PBBFAC,,, | Performed by: FAMILY MEDICINE

## 2022-09-06 PROCEDURE — 3078F DIAST BP <80 MM HG: CPT | Mod: CPTII,S$GLB,, | Performed by: FAMILY MEDICINE

## 2022-09-06 PROCEDURE — 3078F PR MOST RECENT DIASTOLIC BLOOD PRESSURE < 80 MM HG: ICD-10-PCS | Mod: CPTII,S$GLB,, | Performed by: FAMILY MEDICINE

## 2022-09-06 PROCEDURE — 3288F FALL RISK ASSESSMENT DOCD: CPT | Mod: CPTII,S$GLB,, | Performed by: FAMILY MEDICINE

## 2022-09-06 PROCEDURE — 1159F MED LIST DOCD IN RCRD: CPT | Mod: CPTII,S$GLB,, | Performed by: FAMILY MEDICINE

## 2022-09-06 RX ORDER — LOSARTAN POTASSIUM 100 MG/1
100 TABLET ORAL DAILY
Qty: 90 TABLET | Refills: 3 | Status: SHIPPED | OUTPATIENT
Start: 2022-09-06 | End: 2022-11-28 | Stop reason: SDUPTHER

## 2022-09-06 NOTE — PROGRESS NOTES
Subjective:       Patient ID: Gurwinder Hair is a 67 y.o. male.    Chief Complaint: Follow-up Clinical Reassessment    66 yo M, known to me (last visit with me 06/03/2022) with PMH significant for HTN, CKD3, COPD, Solitary Pulmonary Nodule (calcified granuloma in the RLL), AAA, Tobacco Use. He presents to HTN. This was last evaluated by NP James 07/27/2022. Reports that his most recent anti-hypertensive regimen is losartan 100 mg daily + amlodipine 10 mg daily + lasix 20 mg daily. States losartan was increased from 50-->100 mg daily late June 06/2022 per NP. He has been out of Losartan 100 mg x 1 week. His BP was 130/98 on triage and 117/76 on my repeat. No CP, SOB, dizziness, blurry vision, palpitations, headaches.          Past Medical History:   Diagnosis Date    Asthma     Hyperlipidemia     Hypertension        Patient Active Problem List   Diagnosis    Other closed fractures of distal end of radius (alone)    Essential hypertension    Hyperlipidemia    Stage 3b chronic kidney disease    Tobacco abuse    History of bacterial pneumonia    History of solitary pulmonary nodule    Abdominal aortic aneurysm (AAA) without rupture    COPD, severity to be determined    CRI (chronic renal insufficiency)    Other chest pain    Gastroesophageal reflux disease without esophagitis       Past Surgical History:   Procedure Laterality Date    FINGER FRACTURE SURGERY         Family History   Problem Relation Age of Onset    No Known Problems Mother     No Known Problems Father        Social History     Tobacco Use   Smoking Status Every Day    Packs/day: 1.00    Years: 55.00    Pack years: 55.00    Types: Cigarettes   Smokeless Tobacco Never       Social History     Social History Narrative    Tobacco: Initiated at age 13 yo; max use 1ppd    EtOH: None    Drug: None    Employment: Retired     Education: 1 year of college    Lives alone         2 adult children        Medications have been reviewed and  "reconciled.     Review of patient's allergies indicates:   Allergen Reactions    Codeine Itching        Review of Systems   Constitutional:  Negative for chills, fatigue and fever.   HENT:  Negative for congestion, postnasal drip, rhinorrhea, sinus pressure, sinus pain, sneezing and sore throat.    Eyes:  Negative for visual disturbance.   Respiratory:  Negative for cough, shortness of breath and wheezing.    Cardiovascular:  Negative for chest pain, palpitations and leg swelling.   Neurological:  Negative for light-headedness and headaches.       Objective:        /76 (BP Location: Right arm, Patient Position: Sitting, BP Method: Medium (Automatic))   Pulse 77   Ht 5' 6" (1.676 m)   Wt 97.4 kg (214 lb 13.4 oz)   SpO2 96%   BMI 34.68 kg/m²      Physical Exam  Constitutional:       General: He is not in acute distress.     Appearance: He is well-developed. He is obese.   HENT:      Head: Normocephalic and atraumatic.      Right Ear: External ear normal.      Left Ear: External ear normal.   Eyes:      General: No scleral icterus.     Extraocular Movements: Extraocular movements intact.      Conjunctiva/sclera: Conjunctivae normal.   Cardiovascular:      Rate and Rhythm: Normal rate and regular rhythm.      Heart sounds: Normal heart sounds. No murmur heard.    No friction rub. No gallop.   Pulmonary:      Effort: Pulmonary effort is normal. No respiratory distress.      Breath sounds: No wheezing, rhonchi or rales.   Musculoskeletal:         General: No tenderness or deformity. Normal range of motion.      Cervical back: Normal range of motion.      Right lower leg: No edema.      Left lower leg: No edema.   Skin:     General: Skin is warm and dry.      Findings: No erythema or rash.   Neurological:      Mental Status: He is alert and oriented to person, place, and time.      Cranial Nerves: No cranial nerve deficit.      Motor: No abnormal muscle tone.      Gait: Gait normal.   Psychiatric:         Mood " and Affect: Mood normal.         Behavior: Behavior normal.         Assessment:       1. Essential hypertension    2. Hyperlipidemia, unspecified hyperlipidemia type    3. Stage 3b chronic kidney disease    4. Abdominal aortic aneurysm (AAA) without rupture    5. Personal history of nicotine dependence    6. Tobacco abuse        Plan:       Gurwinder was seen today for follow-up clinical reassessment.    Diagnoses and all orders for this visit:    Essential hypertension  Comments:  BP at target despite being out of Losartan 100 mg daily x 1 week. Continue amlodipine 10 mg qd + lasix 20 mg daily. Refill losartan 100 mg. BP check 4 weeks  Orders:  -     losartan (COZAAR) 100 MG tablet; Take 1 tablet (100 mg total) by mouth once daily.    Hyperlipidemia, unspecified hyperlipidemia type  Comments:  , , HDL 31, .0 2/18/22. Continue atorvastatin 20 mg hs. Continue fenofibric acid 45 mg daily. Consider d/cing fibrate in futrue    Stage 3b chronic kidney disease  Comments:  BUN 15, Cr 1.8, eGFR 38 06/21/2022  BUN 17, Cr 1.8, eGFR 38.4 2/18/22    Abdominal aortic aneurysm (AAA) without rupture  Comments:  US AAA 2/18/2022 with max diameter of 5cm; unchanged from 2021. Last evaluated by vascular 3/2021. Repeat US AAA 02/2023    Personal history of nicotine dependence  Comments:  Discusssed R/B/A of lung cancer screening. Pt agrees to initiate screen. Refer for CT low dose  Orders:  -     CT Chest Lung Screening Low Dose; Future    Tobacco abuse  Comments:  Has cut tobacco use down from 1 ppd to 0.5 ppd. Encouraged to schedule with smoking cessation.           Encouraged annual flu vaccines  Encouraged to await availability of bivalent COVID-19 vaccine    US AA 2/10/2022  Distal abdominal aortic aneurysm that measures up to 5 cm.  The size of this aneurysm is unchanged from 02/15/2021, although there appears to be more plaque formation today when compared to that exam.  The aneurysm extends into the left iliac  artery.  The proximal abdominal aorta measures up to 3.1 cm, also unchanged.    I spent a total of 30 minutes on the day of the visit.This includes face to face time and non-face to face time preparing to see the patient (eg, review of tests), obtaining and/or reviewing separately obtained history, documenting clinical information in the electronic or other health record, independently interpreting results and communicating results to the patient/family/caregiver, or care coordinator.    Follow up in about 4 weeks (around 10/4/2022) for BP check with nurse.

## 2022-09-12 ENCOUNTER — HOSPITAL ENCOUNTER (OUTPATIENT)
Dept: RADIOLOGY | Facility: OTHER | Age: 67
Discharge: HOME | End: 2022-09-12
Attending: FAMILY MEDICINE
Payer: MEDICARE

## 2022-09-12 DIAGNOSIS — Z87.891 PERSONAL HISTORY OF NICOTINE DEPENDENCE: ICD-10-CM

## 2022-09-12 PROCEDURE — 71271 CT THORAX LUNG CANCER SCR C-: CPT | Mod: 26,,, | Performed by: RADIOLOGY

## 2022-09-12 PROCEDURE — 71271 CT CHEST LUNG SCREENING LOW DOSE: ICD-10-PCS | Mod: 26,,, | Performed by: RADIOLOGY

## 2022-09-12 PROCEDURE — 71271 CT THORAX LUNG CANCER SCR C-: CPT | Mod: TC

## 2022-09-13 ENCOUNTER — CLINICAL SUPPORT (OUTPATIENT)
Dept: SMOKING CESSATION | Facility: CLINIC | Age: 67
End: 2022-09-13
Payer: COMMERCIAL

## 2022-09-13 ENCOUNTER — PATIENT MESSAGE (OUTPATIENT)
Dept: PRIMARY CARE CLINIC | Facility: CLINIC | Age: 67
End: 2022-09-13
Payer: MEDICARE

## 2022-09-13 DIAGNOSIS — F17.200 NICOTINE DEPENDENCE: Primary | ICD-10-CM

## 2022-09-13 DIAGNOSIS — R91.8 LUNG MASS: Primary | ICD-10-CM

## 2022-09-13 PROCEDURE — 99404 PREV MED CNSL INDIV APPRX 60: CPT | Mod: S$GLB,,,

## 2022-09-13 PROCEDURE — 99404 PR PREVENT COUNSEL,INDIV,60 MIN: ICD-10-PCS | Mod: S$GLB,,,

## 2022-09-13 NOTE — PROGRESS NOTES
Individual Follow-Up Form    9/13/2022    Quit Date:     Clinical Status of Patient: Outpatient      Continuing Medication: yes  Patches        Target Symptoms: Withdrawal and medication side effects. The following were  rated moderate (3) to severe (4) on TCRS:  Moderate (3): none  Severe (4): none    Comments: Patient was seen in clinic today for follow up. Patient continues  to smoke  3 cigarettes per day.   Patient remains on prescribed tobacco cessation medication regimen of 21 mg nicotine  patch without any negative side effects at this time.  No refill is needed.   Reminded patient the importance of use nicotine patch every day in order  to becoming tobacco free.  Patient states he is a little frustrated.  Patient states he did not go over to his brother's house to watch the [place] game to avoid drinking and smoking.  Commended patient for well done job.  Encouraged patient to try a rate reduction to about  2 cpd for next visit.  Completion of TCRS (Tobacco Cessation Rating Scale) reviewed strategies, habitual behavior, high risks situations, understanding urges and cravings, stress and relaxation with open discussion and additional interventions. Introduced lapses, relapses, understanding them and analyzing the situation of a lapse, conflict issues that may be linked to a lapse.  Patient will continue bi weekly sessions.      Diagnosis: F17.200    Next Visit: 2 weeks

## 2022-09-13 NOTE — TELEPHONE ENCOUNTER
Pt called and left VM with request for call back. Notified that [facility]ela Message would be sent.     Dr. Suarez

## 2022-09-13 NOTE — Clinical Note
Patient was seen in clinic today for follow up. Patient continues  to smoke  3 cigarettes per day.   Patient remains on prescribed tobacco cessation medication regimen of 21 mg nicotine  patch without any negative side effects at this time.  No refill is needed.   Reminded patient the importance of use nicotine patch every day in order  to becoming tobacco free.  Patient states he is a little frustrated.  Patient states he did not go over to his brother's house to watch the [employer] game to avoid drinking and smoking.  Commended patient for well done job.  Encouraged patient to try a rate reduction to about  2 cpd for next visit.   Reviewed strategies, habitual behavior, high risks situations, understanding urges and cravings, stress and relaxation with open discussion and additional interventions. Introduced lapses, relapses, understanding them and analyzing the situation of a lapse, conflict issues that may be linked to a lapse.  Patient will continue bi weekly sessions.

## 2022-09-14 ENCOUNTER — HOSPITAL ENCOUNTER (OUTPATIENT)
Dept: RADIOLOGY | Facility: OTHER | Age: 67
Discharge: HOME | End: 2022-09-14
Attending: FAMILY MEDICINE
Payer: MEDICARE

## 2022-09-14 DIAGNOSIS — R91.8 LUNG MASS: ICD-10-CM

## 2022-09-14 PROCEDURE — 71250 CT THORAX DX C-: CPT | Mod: TC

## 2022-09-14 PROCEDURE — 71250 CT THORAX DX C-: CPT | Mod: 26,,, | Performed by: RADIOLOGY

## 2022-09-14 PROCEDURE — 71250 CT CHEST WITHOUT CONTRAST: ICD-10-PCS | Mod: 26,,, | Performed by: RADIOLOGY

## 2022-09-16 ENCOUNTER — PATIENT MESSAGE (OUTPATIENT)
Dept: PRIMARY CARE CLINIC | Facility: CLINIC | Age: 67
End: 2022-09-16
Payer: MEDICARE

## 2022-09-16 ENCOUNTER — OFFICE VISIT (OUTPATIENT)
Dept: PULMONOLOGY | Facility: CLINIC | Age: 67
End: 2022-09-16
Payer: MEDICARE

## 2022-09-16 VITALS
OXYGEN SATURATION: 93 % | WEIGHT: 215.63 LBS | DIASTOLIC BLOOD PRESSURE: 82 MMHG | BODY MASS INDEX: 34.65 KG/M2 | HEART RATE: 82 BPM | HEIGHT: 66 IN | SYSTOLIC BLOOD PRESSURE: 140 MMHG

## 2022-09-16 DIAGNOSIS — Z72.0 TOBACCO ABUSE: ICD-10-CM

## 2022-09-16 DIAGNOSIS — R91.1 SOLITARY PULMONARY NODULE: ICD-10-CM

## 2022-09-16 DIAGNOSIS — Z87.898 HISTORY OF SOLITARY PULMONARY NODULE: Primary | ICD-10-CM

## 2022-09-16 RX ORDER — FUROSEMIDE 20 MG/1
TABLET ORAL
COMMUNITY
Start: 2022-07-28 | End: 2022-11-17

## 2022-09-16 NOTE — PROGRESS NOTES
[facility]    SUBJECTIVE:     Chief Complaint: Pulmonary nodule    History of Present Illness:  Gurwinder Hair is a 67 y.o. male who presents for evaluation of pulmonary nodule seen on CT chest. A 1.2cm spiculated nodule was seen in the apex of the right lung. Patient has a 50 pack year smoking history and is currently down to 4-5 cigarettes daily. Trying to quit, but states that he has some life stresses that are making it difficult. Denies any chest pain, cough, weight loss, or night sweats. No family hx of cancer that he is aware of. Last CT chest in our system was  and no nodule was seen in the right apex at that time. A subsequent CT chest was done at [facility] in 2018 and the available report (images not available) does not mention a nodule. Does have some occupational exposures documented below. Last illness was a viral cold in May 2022 that consisted of cough with wheezing. Was prescribed Combivent repismat which he used for ~1 week. States that is he gets SOB while exerting himself, he may use the inhaler once a month.       Smokin pack year history, down to 4-5 cigarettes a day  Other substances: 1/2 a pint of whiskey daily for 30 years, quit   Inhalers: PRN albuterol   Travel: None  Incarceration/homelessness: No  Occupational/environmental exposures: Asbestos exposure during work with [employer] for 20 years. [employer].   Pets/hobbies: None/Play organ and saxophone  Recent illness: No; COVID vaccinated  B-Symptoms: None  Autoimmune symptoms/features: None  Intubation Hx: None  Family Hx: Father- throat cancer      Review of patient's allergies indicates:   Allergen Reactions    Codeine Itching       Past Medical History:   Diagnosis Date    Asthma     Hyperlipidemia     Hypertension      Past Surgical History:   Procedure Laterality Date    FINGER FRACTURE SURGERY       Family History   Problem Relation Age of Onset    No Known Problems Mother     No Known Problems Father   "    Social History     Socioeconomic History    Marital status:    Tobacco Use    Smoking status: Every Day     Packs/day: 1.00     Years: 55.00     Pack years: 55.00     Types: Cigarettes    Smokeless tobacco: Never   Substance and Sexual Activity    Alcohol use: Not Currently     Comment: social    Drug use: Never    Sexual activity: Not Currently   Social History Narrative    Tobacco: Initiated at age 13 yo; max use 1ppd    EtOH: None    Drug: None    Employment: Retired     Education: 1 year of college    Lives alone         2 adult children        Review of Systems:  Review of Systems   Constitutional:  Negative for fever, malaise/fatigue and weight loss.   HENT:  Negative for congestion, sinus pain and sore throat.    Eyes:  Negative for blurred vision and double vision.   Respiratory:  Negative for cough, shortness of breath and wheezing.    Cardiovascular:  Negative for chest pain.   Gastrointestinal:  Negative for abdominal pain, nausea and vomiting.   Musculoskeletal:  Positive for joint pain. Negative for back pain, myalgias and neck pain.        Left Shoulder numbness/pain    Neurological:  Negative for dizziness, weakness and headaches.         OBJECTIVE:     Vital Signs  Vitals:    09/16/22 1447   BP: (!) 140/82   BP Location: Left arm   Patient Position: Sitting   BP Method: Medium (Manual)   Pulse: 82   SpO2: (!) 93%   Weight: 97.8 kg (215 lb 9.8 oz)   Height: 5' 6" (1.676 m)     Body mass index is 34.8 kg/m².    Physical Exam:  Physical Exam  Vitals and nursing note reviewed.   Constitutional:       General: He is not in acute distress.     Appearance: Normal appearance. He is obese. He is not ill-appearing, toxic-appearing or diaphoretic.   HENT:      Head: Normocephalic and atraumatic.      Right Ear: External ear normal.      Left Ear: External ear normal.      Nose: Nose normal.      Mouth/Throat:      Pharynx: Oropharynx is clear.   Eyes:      Extraocular Movements: " Extraocular movements intact.      Conjunctiva/sclera: Conjunctivae normal.      Pupils: Pupils are equal, round, and reactive to light.   Cardiovascular:      Rate and Rhythm: Normal rate and regular rhythm.      Pulses: Normal pulses.      Heart sounds: Normal heart sounds. No murmur heard.    No friction rub. No gallop.   Pulmonary:      Effort: Pulmonary effort is normal. No respiratory distress.      Breath sounds: Normal breath sounds. No stridor. No wheezing, rhonchi or rales.   Chest:      Chest wall: No tenderness.   Abdominal:      Palpations: Abdomen is soft.   Musculoskeletal:         General: Normal range of motion.      Cervical back: Normal range of motion.      Right lower leg: No edema.      Left lower leg: No edema.   Skin:     General: Skin is warm and dry.   Neurological:      General: No focal deficit present.      Mental Status: He is alert. He is disoriented.   Psychiatric:         Behavior: Behavior normal.         Thought Content: Thought content normal.         Judgment: Judgment normal.     Laboratory:  CBC  Lab Results   Component Value Date    WBC 6.40 06/21/2022    HGB 16.2 06/21/2022    HCT 49.2 06/21/2022     06/21/2022    MCV 92 06/21/2022    RDW 14.1 06/21/2022     BMP  Lab Results   Component Value Date     06/21/2022    K 4.3 06/21/2022     06/21/2022    CO2 26 06/21/2022    BUN 15 06/21/2022    CREATININE 1.8 (H) 06/21/2022    GLU 86 06/21/2022    CALCIUM 10.3 06/21/2022     LFTs  Lab Results   Component Value Date    PROT 7.9 06/21/2022    ALBUMIN 4.1 06/21/2022    BILITOT 0.5 06/21/2022    AST 23 06/21/2022    ALKPHOS 53 (L) 06/21/2022    ALT 21 06/21/2022       Chest Imaging, My Impression:   9/2022- CT chest- 1.2 spiculated right apical nodule. Subpleural emphysema.       Diagnostic Results:  CT: Reviewed    ASSESSMENT/PLAN:     No problems updated.  Problem List Items Addressed This Visit          Pulmonary    History of solitary pulmonary nodule - Primary     Relevant Orders    Spirometry with/without bronchodilator    Lung Volumes    DLCO-Carbon Monoxide Diffusing Capacity       Other    Tobacco abuse    Relevant Orders    Spirometry with/without bronchodilator    Lung Volumes    DLCO-Carbon Monoxide Diffusing Capacity     Other Visit Diagnoses       Bronchitis due to tobacco use        Relevant Orders    Spirometry with/without bronchodilator    Solitary pulmonary nodule        Relevant Orders    Lung Volumes    DLCO-Carbon Monoxide Diffusing Capacity          #Solitary Pulmonary Nodule  - 1.2cm spiculate right apical nodule. Visible airway going to the nodule.  -Will reach out to Tami Ward and Raúl to evaluate for possible robotic bronchoscopy.  -May require PET scan first    #Tobacco Abuse  -50 pack year history, currently smoking 4-5 cigarettes daily  -Is actively trying to quit by gradually reducing his daily use. States that he would like to continue his gradual use.  -He is aware that the nodule could be cancer and he needs to stop smoking.  -Complete PFTs ordered.      Kim Regalado MD  [facility]

## 2022-09-16 NOTE — PROGRESS NOTES
67 year old man with a history of tobacco abuse disorder here to discuss and abnormal CT Chest.  Spiculated nodule 1.2 cm nodule in the right upper lobe.  Appears to have a airway leading into it making it amenable to biopsy by robotic bronchoscopy.  Plan to ask advanced bronchoscopists for further evaluation and schedule biopsy accordingly. Check PFTs.

## 2022-09-19 ENCOUNTER — TELEPHONE (OUTPATIENT)
Dept: PULMONOLOGY | Facility: CLINIC | Age: 67
End: 2022-09-19
Payer: MEDICARE

## 2022-09-19 NOTE — TELEPHONE ENCOUNTER
----- Message from Jeremy Ghosh MD sent at 9/19/2022  7:22 AM CDT -----  Regarding: RE: Right apical lung nodule  Cami,     Add this patient to my schedule on Thursday 9/22 at 130    DV   ----- Message -----  From: Kim Regalado MD  Sent: 9/16/2022   3:39 PM CDT  To: Kristi Ward MD, Jeremy Ghosh MD, #  Subject: Right apical lung nodule                         Hi!    This patient is a current smoker with a 50 pack year smoking history who presented to clinic after a 1.2cm spiculated nodule was seen in the right apex. There seems to be an airway going to it. Wanted to know if one of you guys could take a look at the imaging to decide if he would be a good candidate for a robot or if he would need a PET first. Thank you!    Kim Regalado

## 2022-09-20 ENCOUNTER — TELEPHONE (OUTPATIENT)
Dept: PULMONOLOGY | Facility: CLINIC | Age: 67
End: 2022-09-20
Payer: MEDICARE

## 2022-09-20 NOTE — TELEPHONE ENCOUNTER
I spoke with patient to schedule follow up with Dr Ghosh on 9/22/22 at 1:30pm/Dr Regalado. Patient confirmed and verbalized understanding.

## 2022-09-22 ENCOUNTER — OFFICE VISIT (OUTPATIENT)
Dept: PULMONOLOGY | Facility: CLINIC | Age: 67
End: 2022-09-22
Payer: MEDICARE

## 2022-09-22 VITALS
WEIGHT: 213.19 LBS | SYSTOLIC BLOOD PRESSURE: 120 MMHG | OXYGEN SATURATION: 94 % | BODY MASS INDEX: 34.26 KG/M2 | DIASTOLIC BLOOD PRESSURE: 82 MMHG | HEART RATE: 91 BPM | HEIGHT: 66 IN

## 2022-09-22 DIAGNOSIS — R91.1 SOLITARY PULMONARY NODULE: Primary | ICD-10-CM

## 2022-09-22 PROCEDURE — 4010F PR ACE/ARB THEARPY RXD/TAKEN: ICD-10-PCS | Mod: CPTII,S$GLB,, | Performed by: EMERGENCY MEDICINE

## 2022-09-22 PROCEDURE — 99215 OFFICE O/P EST HI 40 MIN: CPT | Mod: S$GLB,,, | Performed by: EMERGENCY MEDICINE

## 2022-09-22 PROCEDURE — 99999 PR PBB SHADOW E&M-EST. PATIENT-LVL III: ICD-10-PCS | Mod: PBBFAC,,, | Performed by: EMERGENCY MEDICINE

## 2022-09-22 PROCEDURE — 3288F PR FALLS RISK ASSESSMENT DOCUMENTED: ICD-10-PCS | Mod: CPTII,S$GLB,, | Performed by: EMERGENCY MEDICINE

## 2022-09-22 PROCEDURE — 1101F PR PT FALLS ASSESS DOC 0-1 FALLS W/OUT INJ PAST YR: ICD-10-PCS | Mod: CPTII,S$GLB,, | Performed by: EMERGENCY MEDICINE

## 2022-09-22 PROCEDURE — 3079F PR MOST RECENT DIASTOLIC BLOOD PRESSURE 80-89 MM HG: ICD-10-PCS | Mod: CPTII,S$GLB,, | Performed by: EMERGENCY MEDICINE

## 2022-09-22 PROCEDURE — 3079F DIAST BP 80-89 MM HG: CPT | Mod: CPTII,S$GLB,, | Performed by: EMERGENCY MEDICINE

## 2022-09-22 PROCEDURE — 1160F PR REVIEW ALL MEDS BY PRESCRIBER/CLIN PHARMACIST DOCUMENTED: ICD-10-PCS | Mod: CPTII,S$GLB,, | Performed by: EMERGENCY MEDICINE

## 2022-09-22 PROCEDURE — 1159F MED LIST DOCD IN RCRD: CPT | Mod: CPTII,S$GLB,, | Performed by: EMERGENCY MEDICINE

## 2022-09-22 PROCEDURE — 1159F PR MEDICATION LIST DOCUMENTED IN MEDICAL RECORD: ICD-10-PCS | Mod: CPTII,S$GLB,, | Performed by: EMERGENCY MEDICINE

## 2022-09-22 PROCEDURE — 3288F FALL RISK ASSESSMENT DOCD: CPT | Mod: CPTII,S$GLB,, | Performed by: EMERGENCY MEDICINE

## 2022-09-22 PROCEDURE — 1126F PR PAIN SEVERITY QUANTIFIED, NO PAIN PRESENT: ICD-10-PCS | Mod: CPTII,S$GLB,, | Performed by: EMERGENCY MEDICINE

## 2022-09-22 PROCEDURE — 3008F BODY MASS INDEX DOCD: CPT | Mod: CPTII,S$GLB,, | Performed by: EMERGENCY MEDICINE

## 2022-09-22 PROCEDURE — 3074F PR MOST RECENT SYSTOLIC BLOOD PRESSURE < 130 MM HG: ICD-10-PCS | Mod: CPTII,S$GLB,, | Performed by: EMERGENCY MEDICINE

## 2022-09-22 PROCEDURE — 3074F SYST BP LT 130 MM HG: CPT | Mod: CPTII,S$GLB,, | Performed by: EMERGENCY MEDICINE

## 2022-09-22 PROCEDURE — 4010F ACE/ARB THERAPY RXD/TAKEN: CPT | Mod: CPTII,S$GLB,, | Performed by: EMERGENCY MEDICINE

## 2022-09-22 PROCEDURE — 1101F PT FALLS ASSESS-DOCD LE1/YR: CPT | Mod: CPTII,S$GLB,, | Performed by: EMERGENCY MEDICINE

## 2022-09-22 PROCEDURE — 1160F RVW MEDS BY RX/DR IN RCRD: CPT | Mod: CPTII,S$GLB,, | Performed by: EMERGENCY MEDICINE

## 2022-09-22 PROCEDURE — 99215 PR OFFICE/OUTPT VISIT, EST, LEVL V, 40-54 MIN: ICD-10-PCS | Mod: S$GLB,,, | Performed by: EMERGENCY MEDICINE

## 2022-09-22 PROCEDURE — 1126F AMNT PAIN NOTED NONE PRSNT: CPT | Mod: CPTII,S$GLB,, | Performed by: EMERGENCY MEDICINE

## 2022-09-22 PROCEDURE — 99999 PR PBB SHADOW E&M-EST. PATIENT-LVL III: CPT | Mod: PBBFAC,,, | Performed by: EMERGENCY MEDICINE

## 2022-09-22 PROCEDURE — 3008F PR BODY MASS INDEX (BMI) DOCUMENTED: ICD-10-PCS | Mod: CPTII,S$GLB,, | Performed by: EMERGENCY MEDICINE

## 2022-09-22 NOTE — H&P (VIEW-ONLY)
Pulmonary & Critical Care Medicine   Consultation Note    Reason for Consultation: Robotic consult     HPI:  Seen in [facility]: Gurwinder Hair is a 67 y.o. male who presents for evaluation of pulmonary nodule seen on CT chest. A 1.2cm spiculated nodule was seen in the apex of the right lung. Patient has a 50 pack year smoking history and is currently down to 4-5 cigarettes daily. Trying to quit, but states that he has some life stresses that are making it difficult. Denies any chest pain, cough, weight loss, or night sweats. No family hx of cancer that he is aware of. Last CT chest in our system was  and no nodule was seen in the right apex at that time. A subsequent CT chest was done at [facility] in 2018 and the available report (images not available) does not mention a nodule. Does have some occupational exposures documented below. Last illness was a viral cold in May 2022 that consisted of cough with wheezing. Was prescribed Combivent repismat which he used for ~1 week. States that is he gets SOB while exerting himself, he may use the inhaler once a month.         Smokin pack year history, down to 4-5 cigarettes a day  Other substances: 1/2 a pint of whiskey daily for 30 years, quit   Inhalers: PRN albuterol   Travel: None  Incarceration/homelessness: No  Occupational/environmental exposures: Asbestos exposure during work with [employer] for 20 years. [employer].   Pets/hobbies: None/Play organ and saxophone  Recent illness: No; COVID vaccinated  B-Symptoms: None  Autoimmune symptoms/features: None  Intubation Hx: None  Family Hx: Father- throat cancer     INTERVAL HISTORY:     Above confirmed. Still smoking, but decreasing. He has no respiratory limitation or symptoms. Has inhaller therapy at home, but keeps on dresser and no real use.   On 81mg ASA. No additional OAC/Anti-plt. Has received general anesthesia in past without issue.   Recently  and now living in [facility].   He has no  "additional complaints today.     Past Medical History:   Diagnosis Date    Asthma     Hyperlipidemia     Hypertension      Past Surgical History:   Procedure Laterality Date    FINGER FRACTURE SURGERY       Social History:   Social History     Socioeconomic History    Marital status:    Tobacco Use    Smoking status: Every Day     Packs/day: 1.00     Years: 55.00     Pack years: 55.00     Types: Cigarettes    Smokeless tobacco: Never   Substance and Sexual Activity    Alcohol use: Not Currently     Comment: social    Drug use: Never    Sexual activity: Not Currently   Social History Narrative    Tobacco: Initiated at age 11 yo; max use 1ppd    EtOH: None    Drug: None    Employment: Retired     Education: 1 year of college    Lives alone         2 adult children      Family History   Problem Relation Age of Onset    No Known Problems Mother     No Known Problems Father      Drug Allergies:   Review of patient's allergies indicates:   Allergen Reactions    Codeine Itching     Review of Systems:   A comprehensive 12-point review of systems was performed, and is negative except for those items mentioned above in the HPI section of this note.     Vital Signs:    /82 (BP Location: Left arm, Patient Position: Sitting, BP Method: Medium (Manual))   Pulse 91   Ht 5' 6" (1.676 m)   Wt 96.7 kg (213 lb 3 oz)   SpO2 (!) 94%   BMI 34.41 kg/m²      Physical Exam:     GEN- NAD AAOx3 Well Built, Well Appearing   HEENT- ATNC, PERRLA, EOMI, OP-Cl. No JVD, LAD or bruit noted. Trachea Midline.   CV- RRR No M/R/G  RESP- CTA-Bilateral   GI- S/NT/ND. Positive BS X 4. No HSM Noted  BACK- Spine midline. No step off, crepitus or deformity noted. No midline TTP.   Ext- MAEW, No deformity. No edema or rashes noted.   Neuro- Strength 5/5 symmetric. CN 2-12 intact. Normal gait. Normal sensation.      Personal Review and Summary of Prior Diagnostics    Laboratory Studies: Reviewed      Latest Reference Range " & Units 06/21/22 19:21   WBC 3.90 - 12.70 K/uL 6.40   RBC 4.60 - 6.20 M/uL 5.34   Hemoglobin 14.0 - 18.0 g/dL 16.2   Hematocrit 40.0 - 54.0 % 49.2   MCV 82 - 98 fL 92   MCH 27.0 - 31.0 pg 30.3   MCHC 32.0 - 36.0 g/dL 32.9   RDW 11.5 - 14.5 % 14.1   Platelets 150 - 450 K/uL 195   MPV 9.2 - 12.9 fL 10.8   Gran % 38.0 - 73.0 % 57.6   Lymph % 18.0 - 48.0 % 29.5   Mono % 4.0 - 15.0 % 7.5   Eosinophil % 0.0 - 8.0 % 4.8   Basophil % 0.0 - 1.9 % 0.3   Immature Granulocytes 0.0 - 0.5 % 0.3   Gran # (ANC) 1.8 - 7.7 K/uL 3.7   Lymph # 1.0 - 4.8 K/uL 1.9   Mono # 0.3 - 1.0 K/uL 0.5   Eos # 0.0 - 0.5 K/uL 0.3   Baso # 0.00 - 0.20 K/uL 0.02   Immature Grans (Abs) 0.00 - 0.04 K/uL 0.02   nRBC 0 /100 WBC 0   Differential Method  Automated   Sodium 136 - 145 mmol/L 143   Potassium 3.5 - 5.1 mmol/L 4.3   Chloride 95 - 110 mmol/L 107   CO2 23 - 29 mmol/L 26   Anion Gap 8 - 16 mmol/L 10   BUN 8 - 23 mg/dL 15   Creatinine 0.5 - 1.4 mg/dL 1.8 (H)   eGFR if non African American >60 mL/min/1.73 m^2 38 !   eGFR if African American >60 mL/min/1.73 m^2 44 !   Glucose 70 - 110 mg/dL 86   Calcium 8.7 - 10.5 mg/dL 10.3   Alkaline Phosphatase 55 - 135 U/L 53 (L)   PROTEIN TOTAL 6.0 - 8.4 g/dL 7.9   Albumin 3.5 - 5.2 g/dL 4.1   BILIRUBIN TOTAL 0.1 - 1.0 mg/dL 0.5   AST 10 - 40 U/L 23   ALT 10 - 44 U/L 21   BNP 0 - 99 pg/mL <10   Troponin I 0.000 - 0.026 ng/mL 0.007       Microbiology Data: Reviewed       Summary of Chest Imaging Personally Reviewed:     CT Chest- Spiculated right upper lobe semi-solid nodule measuring 1.2 cm.  For a part solid nodule 6 mm or greater, Fleischner Society 2017 guidelines recommend follow up with non-contrast chest CT at 3-6 months to confirm persistence. Persistence of a part-solid nodule with solid component ?6 mm should be considered highly suspicious and may warrant further workup with PET/CT, biopsy, or resection.  More aggressive workup with follow-up with PET CT may be beneficial as next step.     Multiple low  densities in the liver too small to characterize.  Differential considerations hepatic cyst.     Left renal cyst partially visualized.    LDCT- Lungs: There are abnormal opacities that require further evaluation.  The largest opacity in the right lung appears solid and measures 1.2 cm on series 4, image 110.  The largest opacity in the left lung appears solid and measures 0.5 cm on series 4, image 304.  The lungs show findings consistent with emphysema.       2D Echo: The left ventricle is normal in size with normal systolic function.  The estimated ejection fraction is 60%.  Grade I left ventricular diastolic dysfunction.  Normal right ventricular size with normal right ventricular systolic function.    Stress 6/2022-     Normal myocardial perfusion scan. There is no evidence of myocardial ischemia or infarction.    The gated perfusion images showed an ejection fraction of 68% at rest. The gated perfusion images showed an ejection fraction of 62% post stress.    There is normal wall motion at rest and post stress.    The EKG portion of this study is negative for ischemia.    The patient reported no chest pain during the stress test.    There were no arrhythmias during stress.    The exercise capacity was below average.      PFT's: Pending         Assessment:       No diagnosis found.    Outpatient Encounter Medications as of 9/22/2022   Medication Sig Dispense Refill    amLODIPine (NORVASC) 10 MG tablet Take 1 tablet (10 mg total) by mouth once daily. 30 tablet 3    atorvastatin (LIPITOR) 20 MG tablet TAKE 1 TABLET BY MOUTH EVERY EVENING 90 tablet 3    furosemide (LASIX) 20 MG tablet       ipratropium-albuteroL (COMBIVENT)  mcg/actuation inhaler Inhale 2 puffs into the lungs every 6 (six) hours as needed for Wheezing. Rescue 4 g 0    losartan (COZAAR) 100 MG tablet Take 1 tablet (100 mg total) by mouth once daily. 90 tablet 3    nicotine (NICODERM CQ) 21 mg/24 hr Place 1 patch onto the skin once daily. Change  location daily 14 patch 0    pantoprazole (PROTONIX) 20 MG tablet TAKE 1 TABLET(20 MG) BY MOUTH EVERY DAY 90 tablet 0     No facility-administered encounter medications on file as of 9/22/2022.     No orders of the defined types were placed in this encounter.      Plan:       Pulmonary nodule- Spiculated 1.2 cm RUL. + airway sign. Amenable to robotic approach. Discussed with him regarding biopsy vs. PET vs. CT imaging in 3 months. He would like to move forward with biopsy..     -- Case request placed for 10/21   -- Hold ASA 5 days prior to procedure.   -- Plt adequate and no prior anesthesia complications.   -- PFTs pending  -- Will arrange NM PET after procedure.   -- DOES HAVE SOME FIBROTIC FEATUREs/RETICULATIONS ON IMAGING.. ETIOLOGY UNCLEAR. No overt CTD features by exam/history.. This will need some attention on follow up.. NEEDS TO GET PFTS.     Discussed procedure in detail. Risks including PTX, bleeding, respiratory failure and numerous potential complications up to death outlined. All questions answered to his satisfaction. Verbalized understanding and in agreement to proceed. Written consent signed and on chart for day of procedure.     40 minutes of total time spent on the encounter, which includes face-to-face time and non-face-to-face time preparing to see the patient (eg, review of tests), obtaining and/or reviewing separately obtained history, documenting clinical information in the electronic or other health record, independently interpreting results (not separately reported), and communicating results to the patient/family/caregiver, or care coordination (not separately reported).         Jeremy Ghsoh M.D.   [facility]

## 2022-09-22 NOTE — PROGRESS NOTES
Pulmonary & Critical Care Medicine   Consultation Note    Reason for Consultation: Robotic consult     HPI:  Seen in [facility]: Gurwinder Hair is a 67 y.o. male who presents for evaluation of pulmonary nodule seen on CT chest. A 1.2cm spiculated nodule was seen in the apex of the right lung. Patient has a 50 pack year smoking history and is currently down to 4-5 cigarettes daily. Trying to quit, but states that he has some life stresses that are making it difficult. Denies any chest pain, cough, weight loss, or night sweats. No family hx of cancer that he is aware of. Last CT chest in our system was  and no nodule was seen in the right apex at that time. A subsequent CT chest was done at [facility] in 2018 and the available report (images not available) does not mention a nodule. Does have some occupational exposures documented below. Last illness was a viral cold in May 2022 that consisted of cough with wheezing. Was prescribed Combivent repismat which he used for ~1 week. States that is he gets SOB while exerting himself, he may use the inhaler once a month.         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 [employer].   Pets/hobbies: None/Play organ and saxophone  Recent illness: No; COVID vaccinated  B-Symptoms: None  Autoimmune symptoms/features: None  Intubation Hx: None  Family Hx: Father- throat cancer     INTERVAL HISTORY:     Above confirmed. Still smoking, but decreasing. He has no respiratory limitation or symptoms. Has inhaller therapy at home, but keeps on dresser and no real use.   On 81mg ASA. No additional OAC/Anti-plt. Has received general anesthesia in past without issue.   Recently  and now living in [facility].   He has no  "additional complaints today.     Past Medical History:   Diagnosis Date    Asthma     Hyperlipidemia     Hypertension      Past Surgical History:   Procedure Laterality Date    FINGER FRACTURE SURGERY       Social History:   Social History     Socioeconomic History    Marital status:    Tobacco Use    Smoking status: Every Day     Packs/day: 1.00     Years: 55.00     Pack years: 55.00     Types: Cigarettes    Smokeless tobacco: Never   Substance and Sexual Activity    Alcohol use: Not Currently     Comment: social    Drug use: Never    Sexual activity: Not Currently   Social History Narrative    Tobacco: Initiated at age 11 yo; max use 1ppd    EtOH: None    Drug: None    Employment: Retired     Education: 1 year of college    Lives alone         2 adult children      Family History   Problem Relation Age of Onset    No Known Problems Mother     No Known Problems Father      Drug Allergies:   Review of patient's allergies indicates:   Allergen Reactions    Codeine Itching     Review of Systems:   A comprehensive 12-point review of systems was performed, and is negative except for those items mentioned above in the HPI section of this note.     Vital Signs:    /82 (BP Location: Left arm, Patient Position: Sitting, BP Method: Medium (Manual))   Pulse 91   Ht 5' 6" (1.676 m)   Wt 96.7 kg (213 lb 3 oz)   SpO2 (!) 94%   BMI 34.41 kg/m²      Physical Exam:     GEN- NAD AAOx3 Well Built, Well Appearing   HEENT- ATNC, PERRLA, EOMI, OP-Cl. No JVD, LAD or bruit noted. Trachea Midline.   CV- RRR No M/R/G  RESP- CTA-Bilateral   GI- S/NT/ND. Positive BS X 4. No HSM Noted  BACK- Spine midline. No step off, crepitus or deformity noted. No midline TTP.   Ext- MAEW, No deformity. No edema or rashes noted.   Neuro- Strength 5/5 symmetric. CN 2-12 intact. Normal gait. Normal sensation.      Personal Review and Summary of Prior Diagnostics    Laboratory Studies: Reviewed      Latest Reference Range " & Units 06/21/22 19:21   WBC 3.90 - 12.70 K/uL 6.40   RBC 4.60 - 6.20 M/uL 5.34   Hemoglobin 14.0 - 18.0 g/dL 16.2   Hematocrit 40.0 - 54.0 % 49.2   MCV 82 - 98 fL 92   MCH 27.0 - 31.0 pg 30.3   MCHC 32.0 - 36.0 g/dL 32.9   RDW 11.5 - 14.5 % 14.1   Platelets 150 - 450 K/uL 195   MPV 9.2 - 12.9 fL 10.8   Gran % 38.0 - 73.0 % 57.6   Lymph % 18.0 - 48.0 % 29.5   Mono % 4.0 - 15.0 % 7.5   Eosinophil % 0.0 - 8.0 % 4.8   Basophil % 0.0 - 1.9 % 0.3   Immature Granulocytes 0.0 - 0.5 % 0.3   Gran # (ANC) 1.8 - 7.7 K/uL 3.7   Lymph # 1.0 - 4.8 K/uL 1.9   Mono # 0.3 - 1.0 K/uL 0.5   Eos # 0.0 - 0.5 K/uL 0.3   Baso # 0.00 - 0.20 K/uL 0.02   Immature Grans (Abs) 0.00 - 0.04 K/uL 0.02   nRBC 0 /100 WBC 0   Differential Method  Automated   Sodium 136 - 145 mmol/L 143   Potassium 3.5 - 5.1 mmol/L 4.3   Chloride 95 - 110 mmol/L 107   CO2 23 - 29 mmol/L 26   Anion Gap 8 - 16 mmol/L 10   BUN 8 - 23 mg/dL 15   Creatinine 0.5 - 1.4 mg/dL 1.8 (H)   eGFR if non African American >60 mL/min/1.73 m^2 38 !   eGFR if African American >60 mL/min/1.73 m^2 44 !   Glucose 70 - 110 mg/dL 86   Calcium 8.7 - 10.5 mg/dL 10.3   Alkaline Phosphatase 55 - 135 U/L 53 (L)   PROTEIN TOTAL 6.0 - 8.4 g/dL 7.9   Albumin 3.5 - 5.2 g/dL 4.1   BILIRUBIN TOTAL 0.1 - 1.0 mg/dL 0.5   AST 10 - 40 U/L 23   ALT 10 - 44 U/L 21   BNP 0 - 99 pg/mL <10   Troponin I 0.000 - 0.026 ng/mL 0.007       Microbiology Data: Reviewed       Summary of Chest Imaging Personally Reviewed:     CT Chest- Spiculated right upper lobe semi-solid nodule measuring 1.2 cm.  For a part solid nodule 6 mm or greater, Fleischner Society 2017 guidelines recommend follow up with non-contrast chest CT at 3-6 months to confirm persistence. Persistence of a part-solid nodule with solid component ?6 mm should be considered highly suspicious and may warrant further workup with PET/CT, biopsy, or resection.  More aggressive workup with follow-up with PET CT may be beneficial as next step.     Multiple low  densities in the liver too small to characterize.  Differential considerations hepatic cyst.     Left renal cyst partially visualized.    LDCT- Lungs: There are abnormal opacities that require further evaluation.  The largest opacity in the right lung appears solid and measures 1.2 cm on series 4, image 110.  The largest opacity in the left lung appears solid and measures 0.5 cm on series 4, image 304.  The lungs show findings consistent with emphysema.       2D Echo: The left ventricle is normal in size with normal systolic function.  The estimated ejection fraction is 60%.  Grade I left ventricular diastolic dysfunction.  Normal right ventricular size with normal right ventricular systolic function.    Stress 6/2022-     Normal myocardial perfusion scan. There is no evidence of myocardial ischemia or infarction.    The gated perfusion images showed an ejection fraction of 68% at rest. The gated perfusion images showed an ejection fraction of 62% post stress.    There is normal wall motion at rest and post stress.    The EKG portion of this study is negative for ischemia.    The patient reported no chest pain during the stress test.    There were no arrhythmias during stress.    The exercise capacity was below average.      PFT's: Pending         Assessment:       No diagnosis found.    Outpatient Encounter Medications as of 9/22/2022   Medication Sig Dispense Refill    amLODIPine (NORVASC) 10 MG tablet Take 1 tablet (10 mg total) by mouth once daily. 30 tablet 3    atorvastatin (LIPITOR) 20 MG tablet TAKE 1 TABLET BY MOUTH EVERY EVENING 90 tablet 3    furosemide (LASIX) 20 MG tablet       ipratropium-albuteroL (COMBIVENT)  mcg/actuation inhaler Inhale 2 puffs into the lungs every 6 (six) hours as needed for Wheezing. Rescue 4 g 0    losartan (COZAAR) 100 MG tablet Take 1 tablet (100 mg total) by mouth once daily. 90 tablet 3    nicotine (NICODERM CQ) 21 mg/24 hr Place 1 patch onto the skin once daily. Change  location daily 14 patch 0    pantoprazole (PROTONIX) 20 MG tablet TAKE 1 TABLET(20 MG) BY MOUTH EVERY DAY 90 tablet 0     No facility-administered encounter medications on file as of 9/22/2022.     No orders of the defined types were placed in this encounter.      Plan:       Pulmonary nodule- Spiculated 1.2 cm RUL. + airway sign. Amenable to robotic approach. Discussed with him regarding biopsy vs. PET vs. CT imaging in 3 months. He would like to move forward with biopsy..     -- Case request placed for 10/21   -- Hold ASA 5 days prior to procedure.   -- Plt adequate and no prior anesthesia complications.   -- PFTs pending  -- Will arrange NM PET after procedure.   -- DOES HAVE SOME FIBROTIC FEATUREs/RETICULATIONS ON IMAGING.. ETIOLOGY UNCLEAR. No overt CTD features by exam/history.. This will need some attention on follow up.. NEEDS TO GET PFTS.     Discussed procedure in detail. Risks including PTX, bleeding, respiratory failure and numerous potential complications up to death outlined. All questions answered to his satisfaction. Verbalized understanding and in agreement to proceed. Written consent signed and on chart for day of procedure.     40 minutes of total time spent on the encounter, which includes face-to-face time and non-face-to-face time preparing to see the patient (eg, review of tests), obtaining and/or reviewing separately obtained history, documenting clinical information in the electronic or other health record, independently interpreting results (not separately reported), and communicating results to the patient/family/caregiver, or care coordination (not separately reported).         Jeremy Ghosh M.D.   [facility]

## 2022-09-27 ENCOUNTER — CLINICAL SUPPORT (OUTPATIENT)
Dept: SMOKING CESSATION | Facility: CLINIC | Age: 67
End: 2022-09-27
Payer: COMMERCIAL

## 2022-09-27 DIAGNOSIS — F17.200 NICOTINE DEPENDENCE: Primary | ICD-10-CM

## 2022-09-27 PROCEDURE — 99403 PREV MED CNSL INDIV APPRX 45: CPT | Mod: S$GLB,,,

## 2022-09-27 PROCEDURE — 99403 PR PREVENT COUNSEL,INDIV,45 MIN: ICD-10-PCS | Mod: S$GLB,,,

## 2022-09-27 RX ORDER — IBUPROFEN 200 MG
1 TABLET ORAL DAILY
Qty: 14 PATCH | Refills: 0 | Status: SHIPPED | OUTPATIENT
Start: 2022-09-27 | End: 2022-12-05

## 2022-09-27 NOTE — PROGRESS NOTES
Individual Follow-Up Form    9/27/2022    Quit Date:     Clinical Status of Patient: Outpatient    Continuing Medication: yes  Patches     Target Symptoms: Withdrawal and medication side effects. The following were  rated moderate (3) to severe (4) on TCRS:  Moderate (3) none  Severe (4): none    Comments: Patient was seen in clinic today for follow up.  Patient continues  to smoke  3 cigarettes per day.  Patient remains on prescribed tobacco cessation medication regimen of 21 mg nicotine  patch without any negative side effects at this time.  Refill sent to pharmacy.  Patient reports having  stress due to new finding nodule in his lung  since last visit which interfered with focus on quitting or rate fading.  We discussed the importance of stress management and finding other ways to deal with stress.  We discussed distraction strategies and relaxation technique.  We reviewed the importance of quitting and health benefits to expect.  Informed patient of benefit period.  Patient wishes to continue the program.  Will renew benefit on next visit.    Prescribed medication management will be by physician.  Patient will continue bi weekly sessions.      Diagnosis: F17.200    Next Visit: 2 weeks

## 2022-09-27 NOTE — Clinical Note
Patient was seen in clinic today for follow up.  Patient continues  to smoke  3 cigarettes per day.  Patient remains on prescribed tobacco cessation medication regimen of 21 mg nicotine  patch without any negative side effects at this time. Refill sent to pharmacy.  Patient reports having  stress due to new finding nodule in his lung  since last visit which interfered with focus on quitting or rate fading.  We discussed the importance of stress management and finding other ways to deal with stress.  We discussed distraction strategies and relaxation technique.  We reviewed the importance of quitting and health benefits to expect.  Informed patient of benefit period.  Patient wishes to continue the program.  Will renew benefit on next visit.   Prescribed medication management will be by physician.  Patient will continue bi weekly sessions.     Adequate: hears normal conversation without difficulty

## 2022-09-29 DIAGNOSIS — R91.1 PULMONARY NODULE 1 CM OR GREATER IN DIAMETER: Primary | ICD-10-CM

## 2022-10-04 ENCOUNTER — CLINICAL SUPPORT (OUTPATIENT)
Dept: PRIMARY CARE CLINIC | Facility: CLINIC | Age: 67
End: 2022-10-04
Payer: MEDICARE

## 2022-10-04 VITALS — SYSTOLIC BLOOD PRESSURE: 122 MMHG | DIASTOLIC BLOOD PRESSURE: 82 MMHG | HEART RATE: 76 BPM

## 2022-10-04 DIAGNOSIS — Z23 FLU VACCINE NEED: Primary | ICD-10-CM

## 2022-10-04 DIAGNOSIS — Z01.30 BLOOD PRESSURE CHECK: Primary | ICD-10-CM

## 2022-10-04 PROCEDURE — G0008 ADMIN INFLUENZA VIRUS VAC: HCPCS | Mod: S$GLB,,, | Performed by: FAMILY MEDICINE

## 2022-10-04 PROCEDURE — 99999 PR PBB SHADOW E&M-EST. PATIENT-LVL III: ICD-10-PCS | Mod: PBBFAC,,,

## 2022-10-04 PROCEDURE — 99999 PR PBB SHADOW E&M-EST. PATIENT-LVL III: CPT | Mod: PBBFAC,,,

## 2022-10-04 PROCEDURE — 90694 VACC AIIV4 NO PRSRV 0.5ML IM: CPT | Mod: S$GLB,,, | Performed by: FAMILY MEDICINE

## 2022-10-04 PROCEDURE — G0008 FLU VACCINE - QUADRIVALENT - ADJUVANTED: ICD-10-PCS | Mod: S$GLB,,, | Performed by: FAMILY MEDICINE

## 2022-10-04 PROCEDURE — 90694 FLU VACCINE - QUADRIVALENT - ADJUVANTED: ICD-10-PCS | Mod: S$GLB,,, | Performed by: FAMILY MEDICINE

## 2022-10-04 NOTE — PROGRESS NOTES
Pt arrived nurse visit manual B/P 122/82 right arm large cuff. Pt requested flu vaccine while in office.

## 2022-10-11 ENCOUNTER — CLINICAL SUPPORT (OUTPATIENT)
Dept: SMOKING CESSATION | Facility: CLINIC | Age: 67
End: 2022-10-11
Payer: COMMERCIAL

## 2022-10-11 DIAGNOSIS — F17.200 NICOTINE DEPENDENCE: Primary | ICD-10-CM

## 2022-10-11 PROCEDURE — 99404 PREV MED CNSL INDIV APPRX 60: CPT | Mod: S$GLB,,,

## 2022-10-11 PROCEDURE — 99404 PR PREVENT COUNSEL,INDIV,60 MIN: ICD-10-PCS | Mod: S$GLB,,,

## 2022-10-11 RX ORDER — VARENICLINE TARTRATE 1 MG/1
TABLET, FILM COATED ORAL
Qty: 56 TABLET | Refills: 0 | Status: SHIPPED | OUTPATIENT
Start: 2022-10-11 | End: 2022-11-22 | Stop reason: SDUPTHER

## 2022-10-11 NOTE — PROGRESS NOTES
Individual Follow-Up Form    10/11/2022    Quit Date:     Clinical Status of Patient: Outpatient    Continuing Medication: yes  Patches     Target Symptoms: Withdrawal and medication side effects. The following were  rated moderate (3) to severe (4) on TCRS:  Moderate (3): none  Severe (4): none    Comments: Patient was seen in clinic today for follow up. Patient continues  to smoke  3 cigarettes per day.  The patient remains on the prescribed tobacco cessation medication regimen of 21 mg nicotine patch without any negative side effects at this time.  No refill is needed.  We discussed about Chantix.  Patient states he tried Chantix in the past and had vivid dreams.  We discussed the proper use of Chantix.  Patient will begin the prescribed tobacco cessation medication regimen Chantix up titration dosage.  Patient reports some weight gain.  We discussed about diet and exercise.  We discussed the importance of setting a quit date.  Patient will rate fade to  2 cpd. We discussed willpower and willingness to quit.  Prescribed medication management will be by physician. Patient will continue with sessions and medication monitoring by CTTS.  Patient will continue bi weekly sessions.         Diagnosis: F17.200    Next Visit: 2 weeks

## 2022-10-20 ENCOUNTER — TELEPHONE (OUTPATIENT)
Dept: PULMONOLOGY | Facility: CLINIC | Age: 67
End: 2022-10-20
Payer: MEDICARE

## 2022-10-20 ENCOUNTER — ANESTHESIA EVENT (OUTPATIENT)
Dept: SURGERY | Facility: HOSPITAL | Age: 67
End: 2022-10-20
Payer: MEDICARE

## 2022-10-20 NOTE — ANESTHESIA PREPROCEDURE EVALUATION
[facility]  Anesthesia Pre-Operative Evaluation         Patient Name: Gurwinder Hair  YOB: 1955  MRN: [id number]    SUBJECTIVE:     Pre-operative evaluation for Procedure(s) (LRB):  ROBOTIC BRONCHOSCOPY (N/A)     10/20/2022    Gurwinder Hair is a 67 y.o. male w/ a significant PMHx of HTN, tobacco abuse, asthma, GERD, and HLD. He presented for evaluation of a right sided pulmonary nodule.    Patient now presents for the above procedure(s).       Prev airway: 07/19/16; Placement Time: 1237; Inserted by: CRNA; Airway Device: LMA; Intubated: Postinduction; Airway Device Size: 5.0; Cuff Inflation: Minimal occlusive pressure; Placement Verified By: Auscultation, Capnometry, Colorimetric EtCO2 device; Complicating Factors: None; Intubation Findings: Positive EtCO2, Bilateral breath sounds, Atraumatic/Condition of teeth unchanged; Securment: Lips; Complications: None      Patient Active Problem List   Diagnosis    Other closed fractures of distal end of radius (alone)    Essential hypertension    Hyperlipidemia    Stage 3b chronic kidney disease    Tobacco abuse    History of bacterial pneumonia    History of solitary pulmonary nodule    Abdominal aortic aneurysm (AAA) without rupture    COPD, severity to be determined    CRI (chronic renal insufficiency)    Other chest pain    Gastroesophageal reflux disease without esophagitis       Review of patient's allergies indicates:   Allergen Reactions    Codeine Itching       Current Inpatient Medications:      No current facility-administered medications on file prior to encounter.     Current Outpatient Medications on File Prior to Encounter   Medication Sig Dispense Refill    atorvastatin (LIPITOR) 20 MG tablet TAKE 1 TABLET BY MOUTH EVERY EVENING 90 tablet 3    furosemide (LASIX) 20 MG tablet       ipratropium-albuteroL (COMBIVENT)  mcg/actuation inhaler Inhale 2 puffs into the lungs every 6 (six) hours as needed for Wheezing.  Rescue 4 g 0    losartan (COZAAR) 100 MG tablet Take 1 tablet (100 mg total) by mouth once daily. 90 tablet 3    nicotine (NICODERM CQ) 21 mg/24 hr Place 1 patch onto the skin once daily. Change location daily 14 patch 0       Past Surgical History:   Procedure Laterality Date    FINGER FRACTURE SURGERY         Social History:  Tobacco Use: High Risk    Smoking Tobacco Use: Every Day    Smokeless Tobacco Use: Never    Passive Exposure: Not on file      Alcohol Use: Not on file        OBJECTIVE:     Vital Signs Range (Last 24H):         Significant Labs:  Lab Results   Component Value Date    WBC 6.40 06/21/2022    HGB 16.2 06/21/2022    HCT 49.2 06/21/2022     06/21/2022    CHOL 156 02/18/2022    TRIG 115 02/18/2022    HDL 31 (L) 02/18/2022    ALT 21 06/21/2022    AST 23 06/21/2022     06/21/2022    K 4.3 06/21/2022     06/21/2022    CREATININE 1.8 (H) 06/21/2022    BUN 15 06/21/2022    CO2 26 06/21/2022    TSH 1.97 02/15/2021       Diagnostic Studies: No relevant studies.    EKG:   Results for orders placed or performed during the hospital encounter of 06/21/22   EKG 12-lead    Collection Time: 06/21/22  7:08 PM    Narrative    Test Reason : R07.9,    Vent. Rate : 076 BPM     Atrial Rate : 076 BPM     P-R Int : 192 ms          QRS Dur : 076 ms      QT Int : 398 ms       P-R-T Axes : 078 -36 075 degrees     QTc Int : 447 ms    Normal sinus rhythm  Left axis deviation  Abnormal ECG    Confirmed by Dmitriy Figueredo MD ([id number]) on 6/22/2022 3:41:01 PM    Referred By: [email or web address]   SELF           Confirmed By:Dmitriy Figueredo MD       2D ECHO:  TTE:  Results for orders placed or performed during the hospital encounter of 07/12/22   Echo   Result Value Ref Range    Ascending aorta 3.43 cm    STJ 2.75 cm    AV mean gradient 3 mmHg    Ao peak ryley 1.01 m/s    Ao VTI 23.01 cm    IVRT 143.02 msec    IVS 0.85 0.6 - 1.1 cm    LA size 3.24 cm    Left Atrium Major Axis 3.86 cm    Left Atrium Minor Axis 4.97 cm     "LVIDd 4.36 3.5 - 6.0 cm    LVIDs 2.79 2.1 - 4.0 cm    LVOT diameter 2.12 cm    LVOT peak VTI 18.85 cm    Posterior Wall 0.81 0.6 - 1.1 cm    MV Peak A Emil 1.10 m/s    E wave deceleration time 151.66 msec    MV Peak E Emil 0.56 m/s    PV Peak D Emil 0.43 m/s    PV Peak S Emil 0.50 m/s    RA Major Axis 5.04 cm    RVDD 2.42 cm    Sinus 3.14 cm    TR Max Emil 2.11 m/s    LA WIDTH 3.35 cm    PV PEAK VELOCITY 0.90 cm/s    MV stenosis pressure 1/2 time 43.98 ms    LV Diastolic Volume 85.96 mL    LV Systolic Volume 29.37 mL    LVOT peak emil 0.86 m/s    MV "A" wave duration 11.07 msec    TDI LATERAL 0.08 m/s    TDI SEPTAL 0.07 m/s    LV LATERAL E/E' RATIO 7.00 m/s    LV SEPTAL E/E' RATIO 8.00 m/s    FS 36 %    LA volume 40.09 cm3    LV mass 113.16 g    Left Ventricle Relative Wall Thickness 0.37 cm    AV valve area 2.89 cm2    AV Velocity Ratio 0.85     AV index (prosthetic) 0.82     MV valve area p 1/2 method 5.00 cm2    E/A ratio 0.51     Mean e' 0.08 m/s    Pulm vein S/D ratio 1.16     LVOT area 3.5 cm2    LVOT stroke volume 66.50 cm3    AV peak gradient 4 mmHg    E/E' ratio 7.47 m/s    Triscuspid Valve Regurgitation Peak Gradient 18 mmHg    BSA 2.09 m2    LV Systolic Volume Index 14.5 mL/m2    LV Diastolic Volume Index 42.34 mL/m2    LA Volume Index 19.7 mL/m2    LV Mass Index 56 g/m2    LA Volume Index (Mod) 18.7 mL/m2    LA volume (mod) 38.00 cm3    EF 60 %    Narrative    · The left ventricle is normal in size with normal systolic function.  · The estimated ejection fraction is 60%.  · Grade I left ventricular diastolic dysfunction.  · Normal right ventricular size with normal right ventricular systolic   function.          BINH:  No results found for this or any previous visit.    ASSESSMENT/PLAN:           Pre-op Assessment    I have reviewed the Patient Summary Reports.     I have reviewed the Nursing Notes. I have reviewed the NPO Status.   I have reviewed the Medications.     Review of Systems  Anesthesia Hx:  No " problems with previous Anesthesia  History of prior surgery of interest to airway management or planning:  Denies Personal Hx of Anesthesia complications.   Social:  Smoker    Hematology/Oncology:  Hematology Normal        EENT/Dental:EENT/Dental Normal   Cardiovascular:   Hypertension hyperlipidemia    Pulmonary:   COPD Asthma    Renal/:   Chronic Renal Disease, CKD    Hepatic/GI:   GERD    Musculoskeletal:  Musculoskeletal Normal    Neurological:  Neurology Normal    Endocrine:  Endocrine Normal  Obesity / BMI > 30      Physical Exam  General: Well nourished, Alert and Oriented    Airway:  Mallampati: III / II  Mouth Opening: Normal  TM Distance: Normal  Tongue: Normal  Neck ROM: Normal ROM    Dental:  Edentulous    Chest/Lungs:  Clear to auscultation, Normal Respiratory Rate    Heart:  Rate: Normal  Rhythm: Regular Rhythm  Sounds: Normal        Anesthesia Plan  Type of Anesthesia, risks & benefits discussed:    Anesthesia Type: Gen ETT  Intra-op Monitoring Plan: Standard ASA Monitors  Post Op Pain Control Plan: multimodal analgesia and IV/PO Opioids PRN  Induction:  IV  Airway Plan: Direct, Post-Induction  Informed Consent: Informed consent signed with the Patient and all parties understand the risks and agree with anesthesia plan.  All questions answered.   ASA Score: 3  Day of Surgery Review of History & Physical: H&P Update referred to the surgeon/provider.    Ready For Surgery From Anesthesia Perspective.     .

## 2022-10-20 NOTE — TELEPHONE ENCOUNTER
I spoke with patient to let him know arrival time to [facility] for 5:30am tomorrow with Dr Ghosh. Patient confirmed and verbalized understanding.

## 2022-10-20 NOTE — TELEPHONE ENCOUNTER
LVM for patient to return my call. I was calling with patient's arrival time to [facility] tomorrow with Dr Ghosh. Patient to call back to discuss.

## 2022-10-20 NOTE — TELEPHONE ENCOUNTER
----- Message from Tahira Uriostegui sent at 10/20/2022 11:47 AM CDT -----  Contact: pt  Pt was trying to return Cami's phone call regarding arrival time for procedure. I tried to contact office, no answer. Pt asked to leave a message with information if he is unable to answer. He is also requesting to be called from a different number due to phone silencing current number that was called from.     Confirmed contact below:  Contact Name:Gurwinder Hair  Phone Number: [phone]

## 2022-10-21 ENCOUNTER — HOSPITAL ENCOUNTER (OUTPATIENT)
Facility: HOSPITAL | Age: 67
Discharge: HOME | End: 2022-10-21
Attending: EMERGENCY MEDICINE | Admitting: EMERGENCY MEDICINE
Payer: MEDICARE

## 2022-10-21 ENCOUNTER — ANESTHESIA (OUTPATIENT)
Dept: SURGERY | Facility: HOSPITAL | Age: 67
End: 2022-10-21
Payer: MEDICARE

## 2022-10-21 VITALS
RESPIRATION RATE: 20 BRPM | WEIGHT: 207 LBS | SYSTOLIC BLOOD PRESSURE: 111 MMHG | TEMPERATURE: 99 F | HEART RATE: 74 BPM | OXYGEN SATURATION: 96 % | BODY MASS INDEX: 33.41 KG/M2 | DIASTOLIC BLOOD PRESSURE: 77 MMHG

## 2022-10-21 DIAGNOSIS — R91.1 LUNG NODULE: Primary | ICD-10-CM

## 2022-10-21 PROCEDURE — C1726 CATH, BAL DIL, NON-VASCULAR: HCPCS | Performed by: EMERGENCY MEDICINE

## 2022-10-21 PROCEDURE — 25000003 PHARM REV CODE 250: Performed by: STUDENT IN AN ORGANIZED HEALTH CARE EDUCATION/TRAINING PROGRAM

## 2022-10-21 PROCEDURE — 36000708 HC OR TIME LEV III 1ST 15 MIN: Performed by: EMERGENCY MEDICINE

## 2022-10-21 PROCEDURE — 37000009 HC ANESTHESIA EA ADD 15 MINS: Performed by: EMERGENCY MEDICINE

## 2022-10-21 PROCEDURE — D9220A PRA ANESTHESIA: ICD-10-PCS | Mod: ,,, | Performed by: ANESTHESIOLOGY

## 2022-10-21 PROCEDURE — 31652 PR BRONCH W/ EBUS, SAMPLING 1 OR 2 NODES, INCL GUIDE: ICD-10-PCS | Mod: ,,, | Performed by: EMERGENCY MEDICINE

## 2022-10-21 PROCEDURE — 88305 TISSUE EXAM BY PATHOLOGIST: CPT | Mod: 26,,, | Performed by: PATHOLOGY

## 2022-10-21 PROCEDURE — 88172 CYTP DX EVAL FNA 1ST EA SITE: CPT | Mod: 26,,, | Performed by: PATHOLOGY

## 2022-10-21 PROCEDURE — 31628 PR BRONCHOSCOPY,TRANSBRONCH BIOPSY: ICD-10-PCS | Mod: 51,RT,, | Performed by: EMERGENCY MEDICINE

## 2022-10-21 PROCEDURE — 71000044 HC DOSC ROUTINE RECOVERY FIRST HOUR: Performed by: EMERGENCY MEDICINE

## 2022-10-21 PROCEDURE — 88173 CYTOPATH EVAL FNA REPORT: CPT | Mod: 26,,, | Performed by: PATHOLOGY

## 2022-10-21 PROCEDURE — 88305 TISSUE EXAM BY PATHOLOGIST: ICD-10-PCS | Mod: 26,,, | Performed by: PATHOLOGY

## 2022-10-21 PROCEDURE — 31627 PR BRONCHOSCOPY,COMPUTER ASSIST/IMAGE-GUIDED NAVIGATION: ICD-10-PCS | Mod: ,,, | Performed by: EMERGENCY MEDICINE

## 2022-10-21 PROCEDURE — 88305 TISSUE EXAM BY PATHOLOGIST: CPT | Mod: 59 | Performed by: PATHOLOGY

## 2022-10-21 PROCEDURE — D9220A PRA ANESTHESIA: Mod: ,,, | Performed by: ANESTHESIOLOGY

## 2022-10-21 PROCEDURE — 88173 CYTOPATH EVAL FNA REPORT: CPT | Performed by: PATHOLOGY

## 2022-10-21 PROCEDURE — 31629 BRONCHOSCOPY/NEEDLE BX EACH: CPT | Mod: 59,RT,, | Performed by: EMERGENCY MEDICINE

## 2022-10-21 PROCEDURE — 88177 PR  EVALUATION OF FNA SMEAR TO DETERMINE ADEQUACY, EA ADD EVAL: ICD-10-PCS | Mod: 26,,, | Performed by: PATHOLOGY

## 2022-10-21 PROCEDURE — 63600175 PHARM REV CODE 636 W HCPCS: Performed by: STUDENT IN AN ORGANIZED HEALTH CARE EDUCATION/TRAINING PROGRAM

## 2022-10-21 PROCEDURE — 88173 PR  INTERPRETATION OF FNA SMEAR: ICD-10-PCS | Mod: 26,,, | Performed by: PATHOLOGY

## 2022-10-21 PROCEDURE — 88172 CYTP DX EVAL FNA 1ST EA SITE: CPT | Mod: 59 | Performed by: PATHOLOGY

## 2022-10-21 PROCEDURE — 88177 CYTP FNA EVAL EA ADDL: CPT | Mod: 26,,, | Performed by: PATHOLOGY

## 2022-10-21 PROCEDURE — 71000015 HC POSTOP RECOV 1ST HR: Performed by: EMERGENCY MEDICINE

## 2022-10-21 PROCEDURE — 31654 BRONCH EBUS IVNTJ PERPH LES: CPT | Mod: ,,, | Performed by: EMERGENCY MEDICINE

## 2022-10-21 PROCEDURE — 36000709 HC OR TIME LEV III EA ADD 15 MIN: Performed by: EMERGENCY MEDICINE

## 2022-10-21 PROCEDURE — 31652 BRONCH EBUS SAMPLNG 1/2 NODE: CPT | Mod: ,,, | Performed by: EMERGENCY MEDICINE

## 2022-10-21 PROCEDURE — 27201423 OPTIME MED/SURG SUP & DEVICES STERILE SUPPLY: Performed by: EMERGENCY MEDICINE

## 2022-10-21 PROCEDURE — 31629 PR BRONCHOSCOPY,TRANSBRON ASPIR BX: ICD-10-PCS | Mod: 59,RT,, | Performed by: EMERGENCY MEDICINE

## 2022-10-21 PROCEDURE — 88177 CYTP FNA EVAL EA ADDL: CPT | Performed by: PATHOLOGY

## 2022-10-21 PROCEDURE — 31627 NAVIGATIONAL BRONCHOSCOPY: CPT | Mod: ,,, | Performed by: EMERGENCY MEDICINE

## 2022-10-21 PROCEDURE — 88172 PR  EVALUATION OF FNA SMEAR TO DETERMINE ADEQUACY, FIRST EVAL: ICD-10-PCS | Mod: 26,,, | Performed by: PATHOLOGY

## 2022-10-21 PROCEDURE — 31654 PR BRONCH W/ EBUS, DIAG OR THERA INTERVENTION PERIPHERAL LESION(S), INCL GUID, ADD ON CODE: ICD-10-PCS | Mod: ,,, | Performed by: EMERGENCY MEDICINE

## 2022-10-21 PROCEDURE — 31628 BRONCHOSCOPY/LUNG BX EACH: CPT | Mod: 51,RT,, | Performed by: EMERGENCY MEDICINE

## 2022-10-21 PROCEDURE — 37000008 HC ANESTHESIA 1ST 15 MINUTES: Performed by: EMERGENCY MEDICINE

## 2022-10-21 RX ORDER — PROPOFOL 10 MG/ML
VIAL (ML) INTRAVENOUS
Status: DISCONTINUED | OUTPATIENT
Start: 2022-10-21 | End: 2022-10-21

## 2022-10-21 RX ORDER — HALOPERIDOL 5 MG/ML
0.5 INJECTION INTRAMUSCULAR EVERY 10 MIN PRN
Status: DISCONTINUED | OUTPATIENT
Start: 2022-10-21 | End: 2022-10-21 | Stop reason: HOSPADM

## 2022-10-21 RX ORDER — PHENYLEPHRINE HCL IN 0.9% NACL 1 MG/10 ML
SYRINGE (ML) INTRAVENOUS
Status: DISCONTINUED | OUTPATIENT
Start: 2022-10-21 | End: 2022-10-21

## 2022-10-21 RX ORDER — MIDAZOLAM HYDROCHLORIDE 1 MG/ML
INJECTION, SOLUTION INTRAMUSCULAR; INTRAVENOUS
Status: DISCONTINUED | OUTPATIENT
Start: 2022-10-21 | End: 2022-10-21

## 2022-10-21 RX ORDER — ACETAMINOPHEN 500 MG
1000 TABLET ORAL
Status: COMPLETED | OUTPATIENT
Start: 2022-10-21 | End: 2022-10-21

## 2022-10-21 RX ORDER — ONDANSETRON 2 MG/ML
INJECTION INTRAMUSCULAR; INTRAVENOUS
Status: DISCONTINUED | OUTPATIENT
Start: 2022-10-21 | End: 2022-10-21

## 2022-10-21 RX ORDER — LIDOCAINE HYDROCHLORIDE 20 MG/ML
INJECTION, SOLUTION EPIDURAL; INFILTRATION; INTRACAUDAL; PERINEURAL
Status: DISCONTINUED | OUTPATIENT
Start: 2022-10-21 | End: 2022-10-21

## 2022-10-21 RX ORDER — FENTANYL CITRATE 50 UG/ML
INJECTION, SOLUTION INTRAMUSCULAR; INTRAVENOUS
Status: DISCONTINUED | OUTPATIENT
Start: 2022-10-21 | End: 2022-10-21

## 2022-10-21 RX ORDER — SODIUM CHLORIDE 0.9 % (FLUSH) 0.9 %
10 SYRINGE (ML) INJECTION
Status: DISCONTINUED | OUTPATIENT
Start: 2022-10-21 | End: 2022-10-21 | Stop reason: HOSPADM

## 2022-10-21 RX ORDER — ROCURONIUM BROMIDE 10 MG/ML
INJECTION, SOLUTION INTRAVENOUS
Status: DISCONTINUED | OUTPATIENT
Start: 2022-10-21 | End: 2022-10-21

## 2022-10-21 RX ORDER — PROPOFOL 10 MG/ML
VIAL (ML) INTRAVENOUS CONTINUOUS PRN
Status: DISCONTINUED | OUTPATIENT
Start: 2022-10-21 | End: 2022-10-21

## 2022-10-21 RX ORDER — FENTANYL CITRATE 50 UG/ML
25 INJECTION, SOLUTION INTRAMUSCULAR; INTRAVENOUS EVERY 5 MIN PRN
Status: DISCONTINUED | OUTPATIENT
Start: 2022-10-21 | End: 2022-10-21 | Stop reason: HOSPADM

## 2022-10-21 RX ADMIN — ROCURONIUM BROMIDE 100 MG: 10 INJECTION INTRAVENOUS at 07:10

## 2022-10-21 RX ADMIN — LIDOCAINE HYDROCHLORIDE 80 MG: 20 INJECTION, SOLUTION EPIDURAL; INFILTRATION; INTRACAUDAL; PERINEURAL at 07:10

## 2022-10-21 RX ADMIN — SODIUM CHLORIDE: 0.9 INJECTION, SOLUTION INTRAVENOUS at 07:10

## 2022-10-21 RX ADMIN — Medication 100 MCG: at 07:10

## 2022-10-21 RX ADMIN — MIDAZOLAM HYDROCHLORIDE 2 MG: 1 INJECTION, SOLUTION INTRAMUSCULAR; INTRAVENOUS at 07:10

## 2022-10-21 RX ADMIN — ONDANSETRON 4 MG: 2 INJECTION INTRAMUSCULAR; INTRAVENOUS at 08:10

## 2022-10-21 RX ADMIN — PROPOFOL 150 MG: 10 INJECTION, EMULSION INTRAVENOUS at 07:10

## 2022-10-21 RX ADMIN — Medication 150 MCG/KG/MIN: at 07:10

## 2022-10-21 RX ADMIN — Medication 100 MCG: at 08:10

## 2022-10-21 RX ADMIN — SUGAMMADEX 400 MG: 100 INJECTION, SOLUTION INTRAVENOUS at 09:10

## 2022-10-21 RX ADMIN — ACETAMINOPHEN 1000 MG: 500 TABLET ORAL at 05:10

## 2022-10-21 RX ADMIN — FENTANYL CITRATE 100 MCG: 50 INJECTION, SOLUTION INTRAMUSCULAR; INTRAVENOUS at 07:10

## 2022-10-21 NOTE — BRIEF OP NOTE
Bronchoscopy completed- No endobronchial lesions identified. Normal mucosa and normal anatomy.     Robotic- Successful navigation RUL Nodule- FNA x6 + TbbX x 7     EBUS- Station 11L + 7- FNA x 3 at both sites.     11R, 4L/4R also evaluated and no LN enlargement identified.     No immediate complications. To recovery with anesthesia. Full dictation to follow.       Jeremy Ghosh M.D.   [facility]

## 2022-10-21 NOTE — TRANSFER OF CARE
Anesthesia Transfer of Care Note    Patient: Gurwinder Hair    Procedure(s) Performed: Procedure(s) (LRB):  ROBOTIC BRONCHOSCOPY (N/A)    Patient location: [facility]    Anesthesia Type: general    Transport from OR: Transported from OR on 6-10 L/min O2 by face mask with adequate spontaneous ventilation    Post pain: adequate analgesia    Post assessment: no apparent anesthetic complications and tolerated procedure well    Post vital signs: stable    Level of consciousness: awake and responds to stimulation    Nausea/Vomiting: no nausea/vomiting    Complications: none    Transfer of care protocol was followed      Last vitals:   Visit Vitals  /88 (BP Location: Left arm, Patient Position: Lying)   Pulse 79   Temp 37 °C (98.6 °F) (Oral)   Resp 18   Wt 93.9 kg (207 lb)   SpO2 (!) 94%   BMI 33.41 kg/m²

## 2022-10-21 NOTE — INTERVAL H&P NOTE
The patient has been examined and the H&P has been reviewed:    I concur with the findings and no changes have occurred since H&P was written.    Procedure risks, benefits and alternative options discussed and understood by patient/family.      Pulmonary nodule.     Consent signed in clinic and on chart..     Plan for robotic- RUL nodule. + EBUS (no pathologic nodes by imaging)- All questions answered. Proceed         Jeremy Ghosh M.D.   [facility]

## 2022-10-21 NOTE — ANESTHESIA POSTPROCEDURE EVALUATION
Anesthesia Post Evaluation    Patient: Gurwinder Hair    Procedure(s) Performed: Procedure(s) (LRB):  ROBOTIC BRONCHOSCOPY (N/A)    Final Anesthesia Type: general      Level of consciousness: awake and alert  Post-procedure vital signs: reviewed and stable  Pain control: Pain has been treated.  Airway patency: patent    PONV status: Absent or treated.  Anesthetic complications: no      Cardiovascular status: hemodynamically stable  Respiratory status: unassisted  Hydration status: euvolemic            Vitals Value Taken Time   /77 10/21/22 1033   Temp 37.1 °C (98.7 °F) 10/21/22 0928   Pulse 72 10/21/22 1039   Resp 23 10/21/22 1039   SpO2 93 % 10/21/22 1039   Vitals shown include unvalidated device data.      No case tracking events are documented in the log.      Pain/Jose Score: Pain Rating Prior to Med Admin: 0 (10/21/2022  5:58 AM)  Jose Score: 10 (10/21/2022 10:40 AM)

## 2022-10-21 NOTE — ANESTHESIA PROCEDURE NOTES
Intubation    Date/Time: 10/21/2022 7:20 AM  Performed by: Horacio Thompson MD  Authorized by: Gordon Honeycutt MD     Intubation:     Induction:  Intravenous    Intubated:  Postinduction    Mask Ventilation:  Easy mask    Attempts:  2    Attempted By:  Student    Method of Intubation:  Video laryngoscopy    Blade:  Fish 3    Laryngeal View Grade: Grade IV - neither epiglottis nor glottis seen      Attempted By (2nd Attempt):  Resident anesthesiologist    Method of Intubation (2nd Attempt):  Video laryngoscopy    Blade (2nd Attempt):  Fish 3    Laryngeal View Grade (2nd Attempt): Grade I - full view of cords      Difficult Airway Encountered?: No      Complications:  None    Airway Device:  Oral endotracheal tube    Airway Device Size:  8.0    Style/Cuff Inflation:  Cuffed (inflated to minimal occlusive pressure)    Secured at:  The lips    Placement Verified By:  Capnometry and Revisualization with laryngoscopy    Complicating Factors:  Obesity, large/floppy epiglottis and oropharyngeal edema or fat    Findings Post-Intubation:  BS equal bilateral and atraumatic/condition of teeth unchanged

## 2022-10-21 NOTE — DISCHARGE SUMMARY
[facility] (2nd Fl)  Discharge Note  Short Stay    Procedure(s) (LRB):  ROBOTIC BRONCHOSCOPY (N/A)  Endobronchial Ultrasound    Robotic RUL Nodule- FNA + TbbX     EBUS Station 11L + 7    CXR pending. Will review prior to discharge.     OUTCOME: Patient tolerated treatment/procedure well without complication and is now ready for discharge.    DISPOSITION: Home or Self Care    FINAL DIAGNOSIS:  RUL Nodule    FOLLOWUP: In clinic as scheduled. Will call with results.     DISCHARGE INSTRUCTIONS:  Written and verbal discharge instructions provided.     TIME SPENT ON DISCHARGE: 25 minutes    JACOBY Moreno MD  [employer] [facility] Fellow

## 2022-10-21 NOTE — PLAN OF CARE
Patient tolerated procedure and anesthesia with no complaints of pain or nausea. Discharge material given and explained to patient and daughter. Both verbalized understanding. Patient awaiting ride via wheelchair in stable condition with no complaints.

## 2022-10-24 ENCOUNTER — TELEPHONE (OUTPATIENT)
Dept: SMOKING CESSATION | Facility: CLINIC | Age: 67
End: 2022-10-24
Payer: MEDICARE

## 2022-10-24 NOTE — TELEPHONE ENCOUNTER
Patient called to cancelled  follow up appointment for 10-25- 2022 at 8:00 AM.  Patient is rescheduled on 10- at 2:00 PM.

## 2022-10-26 ENCOUNTER — CLINICAL SUPPORT (OUTPATIENT)
Dept: SMOKING CESSATION | Facility: CLINIC | Age: 67
End: 2022-10-26
Payer: COMMERCIAL

## 2022-10-26 DIAGNOSIS — F17.200 NICOTINE DEPENDENCE: Primary | ICD-10-CM

## 2022-10-26 PROCEDURE — 99403 PREV MED CNSL INDIV APPRX 45: CPT | Mod: S$GLB,,,

## 2022-10-26 PROCEDURE — 99403 PR PREVENT COUNSEL,INDIV,45 MIN: ICD-10-PCS | Mod: S$GLB,,,

## 2022-10-26 NOTE — Clinical Note
Patient was seen in clinic today for follow up.  Patient continues  to smoke 3 cigarettes per day.  The patient remains on the prescribed tobacco cessation medication regimen of 21 mg nicotine patch without any negative side effects at this time.  No refill is needed.   Patient states that he did not  Chantix since the last visit.  Patient also states that he did not use patch daily.  Encouraged patient to  Chantix and start the regimen.  Reminded patient the importance of use nicotine patch every day in order  to becoming tobacco free.  We discussed the proper use of medications.  We reviewed coping with urges/cravings to smoke and motivation to quit.  We reviewed the importance of quitting and health benefits to expect.   Discussed changing habitual behavior  and using 15 minutes delay to break routine tobacco use. Patient will continue bi weekly sessions.

## 2022-10-26 NOTE — PROGRESS NOTES
Individual Follow-Up Form    10/26/2022    Quit Date:     Clinical Status of Patient: Outpatient    Continuing Medication: yes  Patches     Target Symptoms: Withdrawal and medication side effects. The following were  rated moderate (3) to severe (4) on TCRS:  Moderate (3): none  Severe (4): none    Comments: Patient was seen in clinic today for follow up.  Patient continues  to smoke 3 cigarettes per day.  The patient remains on the prescribed tobacco cessation medication regimen of 21 mg nicotine patch without any negative side effects at this time.  No refill is needed.   Patient states that he did not  Chantix since the last visit.  Patient also states that he did not use patch daily.  Encouraged patient to  Chantix and start the regimen.  Reminded patient the importance of use nicotine patch every day in order  to becoming tobacco free.  We discussed the proper use of medications.  We reviewed coping with urges/cravings to smoke and motivation to quit.  We reviewed the importance of quitting and health benefits to expect.    Discussed changing habitual behavior  and using 15 minutes delay to break routine tobacco use. Patient will continue bi weekly sessions.      Diagnosis: F17.200      Next Visit: 2 weeks

## 2022-11-01 LAB
ADEQUACY: ABNORMAL
COMMENT: ABNORMAL
FINAL PATHOLOGIC DIAGNOSIS: ABNORMAL
Lab: ABNORMAL

## 2022-11-02 ENCOUNTER — DOCUMENTATION ONLY (OUTPATIENT)
Dept: PULMONOLOGY | Facility: HOSPITAL | Age: 67
End: 2022-11-02
Payer: MEDICARE

## 2022-11-02 ENCOUNTER — DOCUMENTATION ONLY (OUTPATIENT)
Dept: CARDIOTHORACIC SURGERY | Facility: CLINIC | Age: 67
End: 2022-11-02
Payer: MEDICARE

## 2022-11-02 ENCOUNTER — TELEPHONE (OUTPATIENT)
Dept: PULMONOLOGY | Facility: CLINIC | Age: 67
End: 2022-11-02
Payer: MEDICARE

## 2022-11-02 DIAGNOSIS — R91.1 LUNG NODULE: Primary | ICD-10-CM

## 2022-11-02 NOTE — PATIENT CARE CONFERENCE
[facility]      THORACIC MULTIDISCIPLINARY TUMOR BOARD  PATIENT REVIEW FORM  ________________________________________________________________________    CLINIC #: [id number]  DATE: 2022    Diagnosis:   Pulmonary nodule    PRESENTER:   Dr. Murillo    PATIENT SUMMARY:   67 y.o. male smoker who presents for evaluation of pulmonary nodule seen on low dose chest CT 22. Revealed a 1.2cm spiculated nodule in the apex of the right lung. Repeat chest CT 22 confirmed presence of RUL nodule. Robotic bronchoscopy 10/21/22 identified atypical cells on smear of RUL nodule biopsy and no evidence of malignancy in lymph node biopsies.      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 [employer]     BOARD RECOMMENDATIONS:   Recommend continued workup with PET and PFTs. Refer to Thoracic surgery for evaluation of RUL 1.2 cm nodule.     CONSULT NEEDED:     [x] Surgery    [] Hem/Onc    [] Rad/Onc    [] Dietary                 [] Social Service    [] Psychology       [] Pulmonology    Clinical Stage: Tumor 0 Node(s) 0 Metastasis 0  Pathologic Stage: Tumor 0 Node(s) 0 Metastasis 0    GROUP STAGE:     [] O    [x] 1A    [] IB    [] IIA    [] IIB     [] IIIA     [] IIIB     [] IIIC    [] IV                               [] Local recurrence     [] Regional recurrence     [] Distant recurrence                   [] NSCLC     [] SCLC     Tumor type     Unstageable:      [] Yes     [] No  Metastatic site(s):          [x] [facility] Treatment Guidelines reviewed and care planned is consistent with guidelines.         (i.e., NCCN, NCI, PD, ACO, AUA, etc.)    PRESENTATION AT CANCER CONFERENCE:         [] Prospective    [] Retrospective     [] Follow-Up          [] Eligible for clinical trial

## 2022-11-02 NOTE — TELEPHONE ENCOUNTER
----- Message from Jeremy Ghosh MD sent at 11/2/2022 12:56 PM CDT -----  Cami can you please arrange for him to have PFTs and NM PET scan. Orders placed.     Thanks,  DV

## 2022-11-02 NOTE — PROGRESS NOTES
Pathology reviewed- Atypical cells identified. Not definitive for malignancy, but suspicious. Given nodule size, location, age, and significant tobacco use it remains highly suspicious for primary lung malignancy. Presented at Multi[facility]D thoracic tumor board and plan for NM PET + PFTs to determine surgical candidacy. Discussed results and plan with him by phone. Orders placed. He has my contact information and can call in the interim if any issues.     Jeremy Ghosh M.D.   [facility]

## 2022-11-03 ENCOUNTER — TELEPHONE (OUTPATIENT)
Dept: HEMATOLOGY/ONCOLOGY | Facility: CLINIC | Age: 67
End: 2022-11-03
Payer: MEDICARE

## 2022-11-03 NOTE — TELEPHONE ENCOUNTER
Attempted to contact Mr. Arias regarding scheduled PET CT, PFTS and Thoracic Surgery appointment.  Unable to leave a message at both numbers listed.  Will try again.

## 2022-11-04 ENCOUNTER — TELEPHONE (OUTPATIENT)
Dept: PULMONOLOGY | Facility: CLINIC | Age: 67
End: 2022-11-04
Payer: MEDICARE

## 2022-11-04 NOTE — TELEPHONE ENCOUNTER
I spoke with patient's daughter, Reshma. She stated she would like to speak with Dr Ghosh about her Dad's results. I let her know I would send a message to Dr Ghosh for review. Reshma verbalized understanding.

## 2022-11-04 NOTE — TELEPHONE ENCOUNTER
----- Message from Nell Noble sent at 11/4/2022 10:40 AM CDT -----  Regarding: Pt Advice  Contact: [phone]  Caller Reshma (Daughter )   Is requesting a call back regarding Results and to discuss his Upcoming appt 11/11 and 11/17 please Call to discuss further.

## 2022-11-08 ENCOUNTER — CLINICAL SUPPORT (OUTPATIENT)
Dept: SMOKING CESSATION | Facility: CLINIC | Age: 67
End: 2022-11-08
Payer: COMMERCIAL

## 2022-11-08 DIAGNOSIS — F17.200 NICOTINE DEPENDENCE: Primary | ICD-10-CM

## 2022-11-08 PROCEDURE — 99404 PR PREVENT COUNSEL,INDIV,60 MIN: ICD-10-PCS | Mod: S$GLB,,,

## 2022-11-08 PROCEDURE — 99404 PREV MED CNSL INDIV APPRX 60: CPT | Mod: S$GLB,,,

## 2022-11-08 NOTE — Clinical Note
Patient was seen in clinic today for follow up. Patient continues  to smoke  3  cigarettes per day after each meal.  The patient remains on the prescribed tobacco cessation medication regimen of 21 mg nicotine patch without any negative side effects at this time.  Patient reports he did  Chantix and started the regimen on October 26, 2022.  No refill is needed.  Encouraged patient to chew gum or candies after each meal in place of cigarettes.  Encouraged patient to try a rate reduction to about  2 cpd for next visit.  We discussed the importance of setting a quit date.  Patient reports eating more and some weight gain.  Completion of TCRS (Tobacco Cessation Rating Scale) reviewed strategies, habitual behavior, stress, and high-risk situations. Introduced stress with addition interventions, SOLVE, relaxation with interventions, nutrition, exercise, weight gain, and the importance of rewarding oneself for accomplishments toward becoming  tobacco free.

## 2022-11-08 NOTE — PROGRESS NOTES
Individual Follow-Up Form    11/8/2022    Quit Date:     Clinical Status of Patient: Outpatient    Continuing Medication: yes  Chantix or Patches     Target Symptoms: Withdrawal and medication side effects. The following were  rated moderate (3) to severe (4) on TCRS:  Moderate (3): none  Severe (4): none    Comments: Patient was seen in clinic today for follow up. Patient continues  to smoke  3  cigarettes per day after each meal.  The patient remains on the prescribed tobacco cessation medication regimen of 21 mg nicotine patch without any negative side effects at this time.  Patient reports he did  Chantix and started the regimen on October 26, 2022.  No refill is needed.  Encouraged patient to chew gum or candies after each meal in place of cigarettes.  Encouraged patient to try a rate reduction to about  2 cpd for next visit.  We discussed the importance of setting a quit date.  Patient reports eating more and some weight gain.  Completion of TCRS (Tobacco Cessation Rating Scale) reviewed strategies, habitual behavior, stress, and high-risk situations. Introduced stress with addition interventions, SOLVE, relaxation with interventions, nutrition, exercise, weight gain, and the importance of rewarding oneself for accomplishments toward becoming  tobacco free. Open discussion of all items with interventions.  Patient will continue bi weekly sessions.      Diagnosis: F17.200    Next Visit: 2 weeks

## 2022-11-11 ENCOUNTER — HOSPITAL ENCOUNTER (OUTPATIENT)
Dept: RADIOLOGY | Facility: HOSPITAL | Age: 67
Discharge: HOME | End: 2022-11-11
Attending: EMERGENCY MEDICINE
Payer: MEDICARE

## 2022-11-11 DIAGNOSIS — R91.1 LUNG NODULE: ICD-10-CM

## 2022-11-11 LAB — POCT GLUCOSE: 97 MG/DL (ref 70–110)

## 2022-11-11 PROCEDURE — 78815 PET IMAGE W/CT SKULL-THIGH: CPT | Mod: TC

## 2022-11-11 PROCEDURE — 78815 NM PET CT ROUTINE: ICD-10-PCS | Mod: 26,PI,, | Performed by: RADIOLOGY

## 2022-11-11 PROCEDURE — A9698 NON-RAD CONTRAST MATERIALNOC: HCPCS | Performed by: EMERGENCY MEDICINE

## 2022-11-11 PROCEDURE — 25500020 PHARM REV CODE 255: Performed by: EMERGENCY MEDICINE

## 2022-11-11 PROCEDURE — 78815 PET IMAGE W/CT SKULL-THIGH: CPT | Mod: 26,PI,, | Performed by: RADIOLOGY

## 2022-11-11 RX ADMIN — BARIUM SULFATE 900 ML: 20 SUSPENSION ORAL at 08:11

## 2022-11-15 NOTE — PROGRESS NOTES
History & Physical    SUBJECTIVE:     History of Present Illness:  Patient is a 67 y.o. male smoker with HTN, HLD, CKD, and asthma here today for evaluation of RUL nodule. LDCT 9/12/22 showed 1.2 cm nodule in RUL. Robotic bronchoscopy 10/21/22 identified atypical cells on smear of RUL nodule. Level 7 and 11L negative for granuloma or malignancy. [place] [facility] [place], PFTs and evaluation for surgery. PET showed RUL nodule SUV 4.1. No CPs or SOB. ED visit for nonspecific chest pain in July. Negative cardiac work-up. Lives in an apartment. ASA 81.      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 [employer].     Chief Complaint   Patient presents with    Consult         Review of patient's allergies indicates:   Allergen Reactions    Codeine Itching       Current Outpatient Medications   Medication Sig Dispense Refill    amLODIPine (NORVASC) 10 MG tablet Take 1 tablet (10 mg total) by mouth once daily. 30 tablet 3    atorvastatin (LIPITOR) 20 MG tablet TAKE 1 TABLET BY MOUTH EVERY EVENING 90 tablet 3    ipratropium-albuteroL (COMBIVENT)  mcg/actuation inhaler Inhale 2 puffs into the lungs every 6 (six) hours as needed for Wheezing. Rescue 4 g 0    losartan (COZAAR) 100 MG tablet Take 1 tablet (100 mg total) by mouth once daily. 90 tablet 3    nicotine (NICODERM CQ) 21 mg/24 hr Place 1 patch onto the skin once daily. Change location daily 14 patch 0    pantoprazole (PROTONIX) 20 MG tablet Take 1 tablet (20 mg total) by mouth once daily. 90 tablet 0    varenicline (CHANTIX) 1 mg Tab Take  half once a day for 3 days, then increase to  half twice a day for 4 days. Beginning on Day 8, take 1 tablet twice daily until complete 56 tablet 0     No current facility-administered medications for this visit.       Past Medical History:   Diagnosis Date    Asthma     Hyperlipidemia     Hypertension   "    Past Surgical History:   Procedure Laterality Date    FINGER FRACTURE SURGERY      ROBOTIC BRONCHOSCOPY N/A 10/21/2022    Procedure: ROBOTIC BRONCHOSCOPY;  Surgeon: Jeremy Ghosh MD;  Location: [facility] OR [address] [place] [address];  Service: Pulmonary;  Laterality: N/A;     Family History   Problem Relation Age of Onset    No Known Problems Mother     No Known Problems Father      Social History     Tobacco Use    Smoking status: Every Day     Packs/day: 1.00     Years: 55.00     Pack years: 55.00     Types: Cigarettes    Smokeless tobacco: Never   Substance Use Topics    Alcohol use: Not Currently     Comment: social    Drug use: Never        Review of Systems:  Review of Systems   Constitutional:  Negative for activity change, appetite change and fatigue.   Respiratory:  Negative for cough and shortness of breath.    Cardiovascular:  Negative for chest pain and leg swelling.   Gastrointestinal:  Negative for abdominal pain, nausea and vomiting.   Genitourinary: Negative.    Musculoskeletal:  Negative for arthralgias.   Skin:  Negative for color change and rash.   Neurological:  Negative for dizziness and syncope.   Psychiatric/Behavioral:  Negative for agitation. The patient is not nervous/anxious.      OBJECTIVE:     Vital Signs (Most Recent)  Vitals:    11/17/22 0848   BP: 129/87   Pulse: 75   SpO2: 99%   Weight: 98.4 kg (216 lb 14.9 oz)   Height: 5' 6" (1.676 m)   PainSc: 0-No pain       Physical Exam:  Physical Exam  Constitutional:       Appearance: Normal appearance.   HENT:      Head: Atraumatic.   Eyes:      Extraocular Movements: Extraocular movements intact.   Cardiovascular:      Rate and Rhythm: Normal rate and regular rhythm.      Pulses: Normal pulses.      Heart sounds: Normal heart sounds.   Pulmonary:      Effort: Pulmonary effort is normal.      Breath sounds: Normal breath sounds.   Abdominal:      General: Abdomen is flat.      Palpations: Abdomen is soft.      Comments: protuberant   Musculoskeletal:    "      General: Normal range of motion.      Cervical back: Normal range of motion and neck supple.      Right lower leg: No edema.      Left lower leg: No edema.   Skin:     General: Skin is warm and dry.   Neurological:      General: No focal deficit present.      Mental Status: He is alert and oriented to person, place, and time.   Psychiatric:         Mood and Affect: Mood normal.         Behavior: Behavior normal.       NM exercise stress 7/12/22:    Normal myocardial perfusion scan. There is no evidence of myocardial ischemia or infarction.    The gated perfusion images showed an ejection fraction of 68% at rest. The gated perfusion images showed an ejection fraction of 62% post stress.    There is normal wall motion at rest and post stress.    The EKG portion of this study is negative for ischemia.    The patient reported no chest pain during the stress test.    There were no arrhythmias during stress.    The exercise capacity was below average.       Chest CT 9/14/22:   Spiculated right upper lobe semi-solid nodule measuring 1.2 cm.  For a part solid nodule 6 mm or greater, Fleischner Society 2017 guidelines recommend follow up with non-contrast chest CT at 3-6 months to confirm persistence. Persistence of a part-solid nodule with solid component ?6 mm should be considered highly suspicious and may warrant further workup with PET/CT, biopsy, or resection.  More aggressive workup with follow-up with PET CT may be beneficial as next step.  Multiple low densities in the liver too small to characterize.  Differential considerations hepatic cyst.  Left renal cyst partially visualized.    PET 11/11/22:   1.  There is a hypermetabolic spiculated nodule in the apical segment the right upper lobe, concerning for primary lung neoplasm.  Tissue would be required for definitive diagnosis.  No additional hypermetabolic lesions concerning for metastatic disease.  2.  Other findings as above.    PFTs 11/17/22:  FEV1: 1.79L  69.2%  DLCO:  10.48  41.2%    ASSESSMENT/PLAN:     Patient is a 67 y.o. male smoker with HTN, HLD, CKD, and asthma here today for evaluation of PET avid RUL nodule.     PLAN:Plan     CBC and CMP today. 6MWT to assess functional status in the setting of reduced DLCO. If desaturation with walk test will discuss options in tumor board. Encouraged smoking cessation.   Pending above, proceed to OR for right robotic assisted upper lobe wedge resection with intraoperative frozen, possible lobectomy with MLND, possible thoracotomy.     Appropriate patient education regarding the toyin-operative period as well as intraoperative details were discussed. Smoking cessation was discussed and strongly encouraged.  Risks, including but not limited to, bleeding, infection, pain and anesthetic complication were discussed. Patient was given the opportunity to ask questions and to have those questions answered to their satisfaction. Patient verbalized understanding to both procedure and associated risks. Consent was obtained.

## 2022-11-17 ENCOUNTER — HOSPITAL ENCOUNTER (OUTPATIENT)
Dept: PULMONOLOGY | Facility: CLINIC | Age: 67
Discharge: HOME | End: 2022-11-17
Payer: MEDICARE

## 2022-11-17 ENCOUNTER — OFFICE VISIT (OUTPATIENT)
Dept: CARDIOTHORACIC SURGERY | Facility: CLINIC | Age: 67
End: 2022-11-17
Payer: MEDICARE

## 2022-11-17 ENCOUNTER — LAB VISIT (OUTPATIENT)
Dept: LAB | Facility: HOSPITAL | Age: 67
End: 2022-11-17
Attending: STUDENT IN AN ORGANIZED HEALTH CARE EDUCATION/TRAINING PROGRAM
Payer: MEDICARE

## 2022-11-17 VITALS — BODY MASS INDEX: 34.05 KG/M2 | WEIGHT: 216.94 LBS | HEIGHT: 67 IN

## 2022-11-17 VITALS
OXYGEN SATURATION: 99 % | WEIGHT: 216.94 LBS | HEIGHT: 66 IN | SYSTOLIC BLOOD PRESSURE: 129 MMHG | HEART RATE: 75 BPM | DIASTOLIC BLOOD PRESSURE: 87 MMHG | BODY MASS INDEX: 34.86 KG/M2

## 2022-11-17 DIAGNOSIS — R91.1 LUNG NODULE: Primary | ICD-10-CM

## 2022-11-17 DIAGNOSIS — Z01.812 ENCOUNTER FOR PRE-OPERATIVE LABORATORY TESTING: ICD-10-CM

## 2022-11-17 DIAGNOSIS — R06.09 DOE (DYSPNEA ON EXERTION): ICD-10-CM

## 2022-11-17 DIAGNOSIS — R91.1 LUNG NODULE: ICD-10-CM

## 2022-11-17 LAB
ALBUMIN SERPL BCP-MCNC: 4.3 G/DL (ref 3.5–5.2)
ALP SERPL-CCNC: 80 U/L (ref 55–135)
ALT SERPL W/O P-5'-P-CCNC: 20 U/L (ref 10–44)
ANION GAP SERPL CALC-SCNC: 10 MMOL/L (ref 8–16)
AST SERPL-CCNC: 20 U/L (ref 10–40)
BASOPHILS # BLD AUTO: 0.04 K/UL (ref 0–0.2)
BASOPHILS NFR BLD: 0.6 % (ref 0–1.9)
BILIRUB SERPL-MCNC: 0.8 MG/DL (ref 0.1–1)
BUN SERPL-MCNC: 13 MG/DL (ref 8–23)
CALCIUM SERPL-MCNC: 9.8 MG/DL (ref 8.7–10.5)
CHLORIDE SERPL-SCNC: 105 MMOL/L (ref 95–110)
CO2 SERPL-SCNC: 28 MMOL/L (ref 23–29)
CREAT SERPL-MCNC: 1.4 MG/DL (ref 0.5–1.4)
DIFFERENTIAL METHOD: NORMAL
DLCO SINGLE BREATH LLN: 18.52
DLCO SINGLE BREATH PRE REF: 41.2 %
DLCO SINGLE BREATH REF: 25.44
DLCOC SBVA LLN: 2.63
DLCOC SBVA REF: 3.9
DLCOC SINGLE BREATH LLN: 18.52
DLCOC SINGLE BREATH REF: 25.44
DLCOCSBVAULN: 5.17
DLCOCSINGLEBREATHULN: 32.37
DLCOSINGLEBREATHULN: 32.37
DLCOVA LLN: 2.63
DLCOVA PRE REF: 60.8 %
DLCOVA PRE: 2.37 ML/(MIN*MMHG*L) (ref 2.63–5.17)
DLCOVA REF: 3.9
DLCOVAULN: 5.17
EOSINOPHIL # BLD AUTO: 0.2 K/UL (ref 0–0.5)
EOSINOPHIL NFR BLD: 2.6 % (ref 0–8)
ERV LLN: -16448.97
ERV PRE REF: 44.8 %
ERV REF: 1.03
ERVULN: ABNORMAL
ERYTHROCYTE [DISTWIDTH] IN BLOOD BY AUTOMATED COUNT: 13.9 % (ref 11.5–14.5)
EST. GFR  (NO RACE VARIABLE): 55.4 ML/MIN/1.73 M^2
FEF 25 75 LLN: 0.8
FEF 25 75 PRE REF: 59.5 %
FEF 25 75 REF: 2.11
FET100 CHG: 21 %
FEV05 LLN: 1.29
FEV05 REF: 2.43
FEV1 CHG: 7.8 %
FEV1 FVC LLN: 65
FEV1 FVC PRE REF: 95.6 %
FEV1 FVC REF: 78
FEV1 LLN: 1.81
FEV1 PRE REF: 69.2 %
FEV1 REF: 2.58
FEV1 VOL CHG: 0.14
FRCPLETH LLN: 2.5
FRCPLETH PREREF: 81.6 %
FRCPLETH REF: 3.49
FRCPLETHULN: 4.47
FVC CHG: 9.6 %
FVC LLN: 2.42
FVC PRE REF: 72.4 %
FVC REF: 3.32
FVC VOL CHG: 0.23
GLUCOSE SERPL-MCNC: 93 MG/DL (ref 70–110)
HCT VFR BLD AUTO: 51.2 % (ref 40–54)
HGB BLD-MCNC: 16.5 G/DL (ref 14–18)
IMM GRANULOCYTES # BLD AUTO: 0.01 K/UL (ref 0–0.04)
IMM GRANULOCYTES NFR BLD AUTO: 0.2 % (ref 0–0.5)
IVC PRE: 2.6 L (ref 2.42–4.24)
IVC SINGLE BREATH LLN: 2.42
IVC SINGLE BREATH PRE REF: 78.2 %
IVC SINGLE BREATH REF: 3.32
IVCSINGLEBREATHULN: 4.24
LLN IC: -9999997.15
LYMPHOCYTES # BLD AUTO: 2.1 K/UL (ref 1–4.8)
LYMPHOCYTES NFR BLD: 32.1 % (ref 18–48)
MCH RBC QN AUTO: 30.1 PG (ref 27–31)
MCHC RBC AUTO-ENTMCNC: 32.2 G/DL (ref 32–36)
MCV RBC AUTO: 93 FL (ref 82–98)
MONOCYTES # BLD AUTO: 0.5 K/UL (ref 0.3–1)
MONOCYTES NFR BLD: 7.6 % (ref 4–15)
NEUTROPHILS # BLD AUTO: 3.7 K/UL (ref 1.8–7.7)
NEUTROPHILS NFR BLD: 56.9 % (ref 38–73)
NRBC BLD-RTO: 0 /100 WBC
PEF LLN: 5.02
PEF PRE REF: 86.2 %
PEF REF: 7.45
PHYSICIAN COMMENT: ABNORMAL
PLATELET # BLD AUTO: 174 K/UL (ref 150–450)
PMV BLD AUTO: 11.4 FL (ref 9.2–12.9)
POST FEF 25 75: 1.27 L/S (ref 0.8–4.04)
POST FET 100: 9.47 SEC
POST FEV1 FVC: 72.94 % (ref 64.99–88.74)
POST FEV1: 1.93 L (ref 1.81–3.3)
POST FEV5: 1.64 L (ref 1.29–3.56)
POST FVC: 2.64 L (ref 2.42–4.24)
POST PEF: 6.16 L/S (ref 5.02–9.88)
POTASSIUM SERPL-SCNC: 3.4 MMOL/L (ref 3.5–5.1)
PRE DLCO: 10.48 ML/(MIN*MMHG) (ref 18.52–32.37)
PRE ERV: 0.46 L (ref -16448.97–16451.03)
PRE FEF 25 75: 1.25 L/S (ref 0.8–4.04)
PRE FET 100: 7.82 SEC
PRE FEV05 REF: 63 %
PRE FEV1 FVC: 74.17 % (ref 64.99–88.74)
PRE FEV1: 1.79 L (ref 1.81–3.3)
PRE FEV5: 1.53 L (ref 1.29–3.56)
PRE FRC PL: 2.85 L (ref 2.5–4.47)
PRE FVC: 2.41 L (ref 2.42–4.24)
PRE IC: 2.14 L (ref -9999997.15–#######.####)
PRE PEF: 6.42 L/S (ref 5.02–9.88)
PRE REF IC: 75 %
PRE RV: 2.38 L (ref 1.78–3.13)
PRE TLC: 4.98 L (ref 5.37–7.67)
PRE VTG: 2.7 L
PROT SERPL-MCNC: 7.8 G/DL (ref 6–8.4)
RAW PRE REF: 203.8 %
RAW PRE: 6.24 CMH2O*S/L (ref 3.06–3.06)
RAW REF: 3.06
RBC # BLD AUTO: 5.48 M/UL (ref 4.6–6.2)
REF IC: 2.85
RV LLN: 1.78
RV PRE REF: 97.1 %
RV REF: 2.45
RVTLC LLN: 31
RVTLC PRE REF: 120.4 %
RVTLC PRE: 47.8 % (ref 30.72–48.68)
RVTLC REF: 40
RVTLCULN: 49
RVULN: 3.13
SGAW PRE REF: 58.7 %
SGAW PRE: 0.05 1/(CMH2O*S) (ref 0.08–0.08)
SGAW REF: 0.08
SODIUM SERPL-SCNC: 143 MMOL/L (ref 136–145)
TLC LLN: 5.37
TLC PRE REF: 76.4 %
TLC REF: 6.52
TLC ULN: 7.67
ULN IC: ABNORMAL
VA PRE: 4.42 L (ref 6.37–6.37)
VA SINGLE BREATH LLN: 6.37
VA SINGLE BREATH PRE REF: 69.4 %
VA SINGLE BREATH REF: 6.37
VASINGLEBREATHULN: 6.37
VC LLN: 2.42
VC PRE REF: 78.2 %
VC PRE: 2.6 L (ref 2.42–4.24)
VC REF: 3.32
VC ULN: 4.24
WBC # BLD AUTO: 6.47 K/UL (ref 3.9–12.7)

## 2022-11-17 PROCEDURE — 1101F PR PT FALLS ASSESS DOC 0-1 FALLS W/OUT INJ PAST YR: ICD-10-PCS | Mod: CPTII,S$GLB,, | Performed by: STUDENT IN AN ORGANIZED HEALTH CARE EDUCATION/TRAINING PROGRAM

## 2022-11-17 PROCEDURE — 1126F AMNT PAIN NOTED NONE PRSNT: CPT | Mod: CPTII,S$GLB,, | Performed by: STUDENT IN AN ORGANIZED HEALTH CARE EDUCATION/TRAINING PROGRAM

## 2022-11-17 PROCEDURE — 3074F SYST BP LT 130 MM HG: CPT | Mod: CPTII,S$GLB,, | Performed by: STUDENT IN AN ORGANIZED HEALTH CARE EDUCATION/TRAINING PROGRAM

## 2022-11-17 PROCEDURE — 4010F ACE/ARB THERAPY RXD/TAKEN: CPT | Mod: CPTII,S$GLB,, | Performed by: STUDENT IN AN ORGANIZED HEALTH CARE EDUCATION/TRAINING PROGRAM

## 2022-11-17 PROCEDURE — 80053 COMPREHEN METABOLIC PANEL: CPT | Performed by: STUDENT IN AN ORGANIZED HEALTH CARE EDUCATION/TRAINING PROGRAM

## 2022-11-17 PROCEDURE — 94726 PULM FUNCT TST PLETHYSMOGRAP: ICD-10-PCS | Mod: S$GLB,,, | Performed by: INTERNAL MEDICINE

## 2022-11-17 PROCEDURE — 36415 COLL VENOUS BLD VENIPUNCTURE: CPT | Performed by: STUDENT IN AN ORGANIZED HEALTH CARE EDUCATION/TRAINING PROGRAM

## 2022-11-17 PROCEDURE — 3074F PR MOST RECENT SYSTOLIC BLOOD PRESSURE < 130 MM HG: ICD-10-PCS | Mod: CPTII,S$GLB,, | Performed by: STUDENT IN AN ORGANIZED HEALTH CARE EDUCATION/TRAINING PROGRAM

## 2022-11-17 PROCEDURE — 1101F PT FALLS ASSESS-DOCD LE1/YR: CPT | Mod: CPTII,S$GLB,, | Performed by: STUDENT IN AN ORGANIZED HEALTH CARE EDUCATION/TRAINING PROGRAM

## 2022-11-17 PROCEDURE — 4010F PR ACE/ARB THEARPY RXD/TAKEN: ICD-10-PCS | Mod: CPTII,S$GLB,, | Performed by: STUDENT IN AN ORGANIZED HEALTH CARE EDUCATION/TRAINING PROGRAM

## 2022-11-17 PROCEDURE — 99205 PR OFFICE/OUTPT VISIT, NEW, LEVL V, 60-74 MIN: ICD-10-PCS | Mod: 25,S$GLB,, | Performed by: STUDENT IN AN ORGANIZED HEALTH CARE EDUCATION/TRAINING PROGRAM

## 2022-11-17 PROCEDURE — 3008F PR BODY MASS INDEX (BMI) DOCUMENTED: ICD-10-PCS | Mod: CPTII,S$GLB,, | Performed by: STUDENT IN AN ORGANIZED HEALTH CARE EDUCATION/TRAINING PROGRAM

## 2022-11-17 PROCEDURE — 99999 PR PBB SHADOW E&M-EST. PATIENT-LVL IV: ICD-10-PCS | Mod: PBBFAC,,, | Performed by: STUDENT IN AN ORGANIZED HEALTH CARE EDUCATION/TRAINING PROGRAM

## 2022-11-17 PROCEDURE — 3079F DIAST BP 80-89 MM HG: CPT | Mod: CPTII,S$GLB,, | Performed by: STUDENT IN AN ORGANIZED HEALTH CARE EDUCATION/TRAINING PROGRAM

## 2022-11-17 PROCEDURE — 3288F FALL RISK ASSESSMENT DOCD: CPT | Mod: CPTII,S$GLB,, | Performed by: STUDENT IN AN ORGANIZED HEALTH CARE EDUCATION/TRAINING PROGRAM

## 2022-11-17 PROCEDURE — 94060 PR EVAL OF BRONCHOSPASM: ICD-10-PCS | Mod: S$GLB,,, | Performed by: INTERNAL MEDICINE

## 2022-11-17 PROCEDURE — 99205 OFFICE O/P NEW HI 60 MIN: CPT | Mod: 25,S$GLB,, | Performed by: STUDENT IN AN ORGANIZED HEALTH CARE EDUCATION/TRAINING PROGRAM

## 2022-11-17 PROCEDURE — 94618 PULMONARY STRESS TESTING: ICD-10-PCS | Mod: S$GLB,,, | Performed by: INTERNAL MEDICINE

## 2022-11-17 PROCEDURE — 94729 PR C02/MEMBANE DIFFUSE CAPACITY: ICD-10-PCS | Mod: S$GLB,,, | Performed by: INTERNAL MEDICINE

## 2022-11-17 PROCEDURE — 99999 PR PBB SHADOW E&M-EST. PATIENT-LVL IV: CPT | Mod: PBBFAC,,, | Performed by: STUDENT IN AN ORGANIZED HEALTH CARE EDUCATION/TRAINING PROGRAM

## 2022-11-17 PROCEDURE — 99407 BEHAV CHNG SMOKING > 10 MIN: CPT | Mod: S$GLB,,, | Performed by: STUDENT IN AN ORGANIZED HEALTH CARE EDUCATION/TRAINING PROGRAM

## 2022-11-17 PROCEDURE — 94060 EVALUATION OF WHEEZING: CPT | Mod: S$GLB,,, | Performed by: INTERNAL MEDICINE

## 2022-11-17 PROCEDURE — 94726 PLETHYSMOGRAPHY LUNG VOLUMES: CPT | Mod: S$GLB,,, | Performed by: INTERNAL MEDICINE

## 2022-11-17 PROCEDURE — 99407 PR TOBACCO USE CESSATION INTENSIVE >10 MINUTES: ICD-10-PCS | Mod: S$GLB,,, | Performed by: STUDENT IN AN ORGANIZED HEALTH CARE EDUCATION/TRAINING PROGRAM

## 2022-11-17 PROCEDURE — 85025 COMPLETE CBC W/AUTO DIFF WBC: CPT | Performed by: STUDENT IN AN ORGANIZED HEALTH CARE EDUCATION/TRAINING PROGRAM

## 2022-11-17 PROCEDURE — 1126F PR PAIN SEVERITY QUANTIFIED, NO PAIN PRESENT: ICD-10-PCS | Mod: CPTII,S$GLB,, | Performed by: STUDENT IN AN ORGANIZED HEALTH CARE EDUCATION/TRAINING PROGRAM

## 2022-11-17 PROCEDURE — 94729 DIFFUSING CAPACITY: CPT | Mod: S$GLB,,, | Performed by: INTERNAL MEDICINE

## 2022-11-17 PROCEDURE — 3079F PR MOST RECENT DIASTOLIC BLOOD PRESSURE 80-89 MM HG: ICD-10-PCS | Mod: CPTII,S$GLB,, | Performed by: STUDENT IN AN ORGANIZED HEALTH CARE EDUCATION/TRAINING PROGRAM

## 2022-11-17 PROCEDURE — 3008F BODY MASS INDEX DOCD: CPT | Mod: CPTII,S$GLB,, | Performed by: STUDENT IN AN ORGANIZED HEALTH CARE EDUCATION/TRAINING PROGRAM

## 2022-11-17 PROCEDURE — 3288F PR FALLS RISK ASSESSMENT DOCUMENTED: ICD-10-PCS | Mod: CPTII,S$GLB,, | Performed by: STUDENT IN AN ORGANIZED HEALTH CARE EDUCATION/TRAINING PROGRAM

## 2022-11-17 PROCEDURE — 94618 PULMONARY STRESS TESTING: CPT | Mod: S$GLB,,, | Performed by: INTERNAL MEDICINE

## 2022-11-19 NOTE — PROCEDURES
Gurwinder Hair is a 67 y.o.  male patient, who presents for a 6 minute walk test ordered by TINA Hung.  The diagnosis is Shortness of Breath; Lung Cancer.  The patient's BMI is 34 kg/m2. Predicted distance (lower limit of normal) is 338.22 meters.    Test Results:    The test was completed without stopping.  The total time walked was 360 seconds.  During walking, the patient reported:  Dyspnea.  The patient used no assistive devices during testing.     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      CLINICAL INTERPRETATION:  Six minute walk distance is 396.24 meters (1300 feet) with very light dyspnea.  During exercise, there was significant desaturation while breathing room air.  Blood pressure remained stable and Heart rate increased significantly with walking.  The patient did not report non-pulmonary symptoms during exercise.  The patient may benefit from using supplemental oxygen during exertion.  No previous study performed.  Based upon age and body mass index, exercise capacity is normal.   Oxygen saturation did improve while breathing supplemental oxygen.

## 2022-11-21 DIAGNOSIS — Z01.818 PRE-OP EVALUATION: ICD-10-CM

## 2022-11-21 DIAGNOSIS — R06.09 DOE (DYSPNEA ON EXERTION): Primary | ICD-10-CM

## 2022-11-21 DIAGNOSIS — R91.1 LUNG NODULE: ICD-10-CM

## 2022-11-21 DIAGNOSIS — J44.9 COPD WITHOUT EXACERBATION: ICD-10-CM

## 2022-11-21 DIAGNOSIS — R06.09 DOE (DYSPNEA ON EXERTION): ICD-10-CM

## 2022-11-21 DIAGNOSIS — R91.1 LUNG NODULE: Primary | ICD-10-CM

## 2022-11-22 ENCOUNTER — PATIENT MESSAGE (OUTPATIENT)
Dept: CARDIOTHORACIC SURGERY | Facility: CLINIC | Age: 67
End: 2022-11-22
Payer: MEDICARE

## 2022-11-22 ENCOUNTER — TELEPHONE (OUTPATIENT)
Dept: CARDIOTHORACIC SURGERY | Facility: CLINIC | Age: 67
End: 2022-11-22
Payer: MEDICARE

## 2022-11-22 ENCOUNTER — CLINICAL SUPPORT (OUTPATIENT)
Dept: SMOKING CESSATION | Facility: CLINIC | Age: 67
End: 2022-11-22
Payer: MEDICARE

## 2022-11-22 DIAGNOSIS — F17.200 NICOTINE DEPENDENCE: Primary | ICD-10-CM

## 2022-11-22 PROCEDURE — 99404 PREV MED CNSL INDIV APPRX 60: CPT | Mod: S$GLB,,,

## 2022-11-22 PROCEDURE — 99404 PR PREVENT COUNSEL,INDIV,60 MIN: ICD-10-PCS | Mod: S$GLB,,,

## 2022-11-22 RX ORDER — IBUPROFEN 200 MG
1 TABLET ORAL DAILY
Qty: 14 PATCH | Refills: 0 | Status: SHIPPED | OUTPATIENT
Start: 2022-11-22 | End: 2022-12-05

## 2022-11-22 RX ORDER — VARENICLINE TARTRATE 1 MG/1
TABLET, FILM COATED ORAL
Qty: 56 TABLET | Refills: 0 | Status: SHIPPED | OUTPATIENT
Start: 2022-11-22 | End: 2022-12-19 | Stop reason: SDUPTHER

## 2022-11-22 NOTE — Clinical Note
Patient was seen in clinic today for follow up.  Patient continues  to smoke  3 cigarettes per day.  Patient remains on the prescribed tobacco cessation medication regimen of 1 mg Chantix BID without any side effects at this time.   Refill sent to pharmacy.  We discussed about decreased nicotine patch dosage.  Patient will begin the prescribed tobacco cessation medication regimen of  14 mg nicotine patch.  Patient is scheduled for surgery on 12-.  Patient's goal is to be tobacco free before his surgery date.  Advised patient not to buy any more cigarettes.  The patient denies any abnormal behavior or mental changes at this time.  Discussed changing habitual behavior  and using 15 minutes delay to break routine tobacco use.  We reviewed the importance of quitting and health benefits to expect.  We discussed willpower and willingness to quit.  We reviewed coping with urges/cravings to smoke and motivation to becoming tobacco free.   Prescribed medication management will be by physician.

## 2022-11-22 NOTE — TELEPHONE ENCOUNTER
Called and left message regarding VQ lung scan needed for surgical clearance. Scan is scheduled for 12/1 @ 830 for clearance for surgery 12/8. Message sent to Viki as well. Asked patient to confirm he received this message or respond on Viki. Direct phone number to office left on voicemail.

## 2022-11-22 NOTE — PROGRESS NOTES
Individual Follow-Up Form    11/22/2022    Quit Date:     Clinical Status of Patient: Outpatient    Continuing Medication: yes  Chantix or Patches     Target Symptoms: Withdrawal and medication side effects. The following were  rated moderate (3) to severe (4) on TCRS:  Moderate (3): none  Severe (4): none    Comments: Patient was seen in clinic today for follow up.  Patient continues  to smoke  3 cigarettes per day.  Patient remains on the prescribed tobacco cessation medication regimen of 1 mg Chantix BID without any side effects at this time.   Refill sent to pharmacy.  We discussed about decreased nicotine patch dosage.  Patient will begin the prescribed tobacco cessation medication regimen of  14 mg nicotine patch.  Patient is scheduled for surgery on 12-.  Patient's goal is to be tobacco free before his surgery date.  Advised patient not to buy any more cigarettes.  The patient denies any abnormal behavior or mental changes at this time.  Discussed changing habitual behavior  and using 15 minutes delay to break routine tobacco use.  We reviewed the importance of quitting and health benefits to expect.  We discussed willpower and willingness to quit.  We reviewed coping with urges/cravings to smoke and motivation to becoming tobacco free.   Prescribed medication management will be by physician.  Patient will continue bi weekly sessions.           Diagnosis: F17.200    Next Visit: 2 weeks

## 2022-11-30 ENCOUNTER — DOCUMENTATION ONLY (OUTPATIENT)
Dept: CARDIOTHORACIC SURGERY | Facility: HOSPITAL | Age: 67
End: 2022-11-30
Payer: MEDICARE

## 2022-11-30 NOTE — PATIENT CARE CONFERENCE
[facility]      THORACIC MULTIDISCIPLINARY TUMOR BOARD  PATIENT REVIEW FORM  ________________________________________________________________________    CLINIC #: [id number]  DATE: 11/30/2022    DIAGNOSIS:   Lung nodule    PRESENTER:   Dr. Cerna     PATIENT SUMMARY:   67 y.o. male smoker who presents for evaluation of pulmonary nodule seen on low dose chest CT 09/12/22. Revealed a 1.2cm spiculated nodule in the apex of the right lung. Repeat chest CT 09/14/22 confirmed presence of RUL nodule. Robotic bronchoscopy 10/21/22 identified atypical cells on smear of RUL nodule biopsy and no evidence of malignancy in lymph node biopsies. Discussed at previous tumor board, 11/02, which recommended continued workup with PET and PFTs as well as thoracic surgery referral. PET 11/11/22 identified a 1.1 cm spiculated nodule in the apical segment of the right upper lobe with SUV max 4.1 and no evidence of pathologically enlarged or hypermetabolic lymph nodes.    PFTs - 11/17/22: FEV1 1.79 69.2% DLCO 10.48 41.2%  6MWT - 1300 ft desaturation to 88% during exercise     NM quant perf scheduled.     BOARD RECOMMENDATIONS:   Atypical cells on biopsy however non diagnostic for malignancy. Reduced DLCO and desaturation during 6MWT cause concern for post-operative respiratory insufficiency. Further evaluate with NM quant perfusion. If RUL is not contributing to overall lung function consider subloar resection otherwise refer to radiation oncology for consideration of SBRT to PET avid lung nodule.     CONSULT NEEDED:     [x] Surgery    [] Hem/Onc    [x] Rad/Onc    [] Dietary                 [] Social Service    [] Psychology       [] Pulmonology    Clinical Stage: Tumor 1 Node(s) 0 Metastasis 0  Pathologic Stage: Tumor  Node(s)  Metastasis     GROUP STAGE:     [] O    [x] 1A    [] IB    [] IIA    [] IIB     [] IIIA     [] IIIB     [] IIIC    [] IV                               [] Local recurrence     [] Regional recurrence     []  Distant recurrence                   [] NSCLC     [] SCLC     Tumor type     Unstageable:      [x] Yes     [] No  Metastatic site(s):          [] [facility] Treatment Guidelines reviewed and care planned is consistent with guidelines.         (i.e., NCCN, NCI, PD, ACO, AUA, etc.)    PRESENTATION AT CANCER CONFERENCE:         [] Prospective    [] Retrospective     [] Follow-Up          [] Eligible for clinical trial

## 2022-12-01 ENCOUNTER — HOSPITAL ENCOUNTER (OUTPATIENT)
Dept: RADIOLOGY | Facility: HOSPITAL | Age: 67
Discharge: HOME | End: 2022-12-01
Attending: PHYSICIAN ASSISTANT
Payer: MEDICARE

## 2022-12-01 DIAGNOSIS — R91.1 LUNG NODULE: ICD-10-CM

## 2022-12-01 DIAGNOSIS — J44.1 COPD EXACERBATION: ICD-10-CM

## 2022-12-01 DIAGNOSIS — J44.9 COPD WITHOUT EXACERBATION: ICD-10-CM

## 2022-12-01 PROCEDURE — 78597 NM LUNG QUANT DIFFERENTIAL PERFUSION W IMAGING: ICD-10-PCS | Mod: 26,,, | Performed by: RADIOLOGY

## 2022-12-01 PROCEDURE — 78597 LUNG PERFUSION DIFFERENTIAL: CPT | Mod: 26,,, | Performed by: RADIOLOGY

## 2022-12-01 PROCEDURE — 78597 LUNG PERFUSION DIFFERENTIAL: CPT | Mod: TC

## 2022-12-05 ENCOUNTER — CLINICAL SUPPORT (OUTPATIENT)
Dept: SMOKING CESSATION | Facility: CLINIC | Age: 67
End: 2022-12-05
Payer: COMMERCIAL

## 2022-12-05 DIAGNOSIS — F17.200 NICOTINE DEPENDENCE: Primary | ICD-10-CM

## 2022-12-05 PROCEDURE — 99404 PREV MED CNSL INDIV APPRX 60: CPT | Mod: S$GLB,,,

## 2022-12-05 PROCEDURE — 99404 PR PREVENT COUNSEL,INDIV,60 MIN: ICD-10-PCS | Mod: S$GLB,,,

## 2022-12-05 RX ORDER — IBUPROFEN 200 MG
1 TABLET ORAL DAILY
Qty: 14 PATCH | Refills: 0 | Status: SHIPPED | OUTPATIENT
Start: 2022-12-05 | End: 2023-02-07 | Stop reason: SDUPTHER

## 2022-12-05 NOTE — Clinical Note
Patient was seen in clinic today for follow up.  Patient states that he lighted  2 cpd.  Patient also states that he only smoked half of a cigarette due to joking/coughing.  Commended patient on the accomplishment.  Patient remains on the prescribed tobacco cessation medication regimen of 1 mg Chantix BID  and 14 mg nicotine patch without any side effects at this time.  Refill of 14 mg nicotine patch sent to pharmacy.  Patient reports feeling nervous due to up coming surgery.   Patient was reminded of how to prepare and use strategies to becoming tobacco free.  We reviewed the importance of quitting and health benefits to expect.  Will do telephone visits after surgery.  Prescribed medication management will be by physician.  Patient will continue bi weekly sessions.

## 2022-12-05 NOTE — PROGRESS NOTES
Individual Follow-Up Form    12/5/2022    Quit Date:     Clinical Status of Patient: Outpatient    Continuing Medication: yes  Chantix or Patches     Target Symptoms: Withdrawal and medication side effects. The following were  rated moderate (3) to severe (4) on TCRS:  Moderate (3): none  Severe (4): none    Comments: Patient was seen in clinic today for follow up.  Patient states that he lighted  2 cpd.  Patient also states that he only smoked half of a cigarette due to joking/coughing.  Commended patient on the accomplishment.  Patient remains on the prescribed tobacco cessation medication regimen of 1 mg Chantix BID  and 14 mg nicotine patch without any side effects at this time.  Refill of 14 mg nicotine patch sent to pharmacy.  Patient reports feeling nervous due to up coming surgery.   Patient was reminded of how to prepare and use strategies to becoming tobacco free.  We reviewed the importance of quitting and health benefits to expect.  Will do telephone visits after surgery.  Prescribed medication management will be by physician.  Patient will continue bi weekly sessions.      Diagnosis: F17.200    Next Visit: 2 weeks

## 2022-12-06 ENCOUNTER — TELEPHONE (OUTPATIENT)
Dept: HEMATOLOGY/ONCOLOGY | Facility: CLINIC | Age: 67
End: 2022-12-06
Payer: MEDICARE

## 2022-12-06 DIAGNOSIS — R91.1 LUNG NODULE: Primary | ICD-10-CM

## 2022-12-06 NOTE — TELEPHONE ENCOUNTER
Attempted to contact Mr. Hair to schedule for Rad/Onc per Tumor Board recommendations.  Left a VM that included my call back information.

## 2022-12-06 NOTE — TELEPHONE ENCOUNTER
Mr. Hair returned my call.  States that the [facility] number is blocked on his phone and calls don't come thru.  States that he doesn't know how to fix this.  Patient is scheduled with Dr. Ramirez on Dec14th.  Patient is active in the Patient Portal.

## 2022-12-13 PROBLEM — C34.11 MALIGNANT NEOPLASM OF RIGHT UPPER LOBE OF LUNG: Status: ACTIVE | Noted: 2022-12-13

## 2022-12-13 NOTE — PROGRESS NOTES
12/14/2022    Radiation Oncology Consultation    Assessment   This is a 67 y.o. y/o male with Stage Stage IA2 (cT1b cN0 M0) RUL presumed NSCLC s/p robotic bronch w/ EBUS 10/21/22 with biopsy of FDG avid RUL nodule revealing atypical cells; lymph nodes negative. Lobectomy would carry high risk, so he is referred for consideration of definitive SBRT.    I discussed treatment options for early stage NSCLC including surgical resection and stereotactic body radiotherapy (SBRT). Since resection would carry high risk for morbidity, I recommend treating the RUL primary to 54 Gy in 3 fractions, which offers local control in 90% of patients. Potential short- and long-term risks of treatment were reviewed with the patient, particularly pneumonitis. At the end of our discussion, he was in agreement with proceeding with the recommended treatment.           Plan   1) Schedule CT Simulation for SBRT lung treatment planning.            CHIEF COMPLAINT: Stage I Lung cancer    HPI/Focused ROS: Mr. Hair is a 66 y/o male who was found to have a 1.2 cm RUL opacity on screening CT Chest 9/12/22. Calcified mediastinal and hilar nodes were noted, but no lymphadenopathy. He underwent robotic bronch w/ EBUS by Dr. Ghosh on 10/21/22 with biopsy of RUL nodule revealing atypical cells. Lymph node stations 7 and 11L were negative for malignant cells. PET/CT 11/11/12 demonstrated increased FDG uptake in the RUL lesion without other evidence of disease. He met with Dr. Cerna to discuss resection, but due to PFTs and desaturation on 6MWT there was concern for his respiratory status following lobectomy. He is referred for consideration of definitive radiation.     Today in clinic, the patient is accompanied by his brother-in-law. He denies significant dyspnea, cough, fevers, or chest pain.       Is the patient female between ages 15-55:  no    Does the patient have a CIED:  no    Prior Radiation History: None      Past Medical History:  "  Diagnosis Date    Asthma     Hyperlipidemia     Hypertension        Past Surgical History:   Procedure Laterality Date    FINGER FRACTURE SURGERY      ROBOTIC BRONCHOSCOPY N/A 10/21/2022    Procedure: ROBOTIC BRONCHOSCOPY;  Surgeon: Jeremy Ghosh MD;  Location: [facility] OR [address] [place] [address];  Service: Pulmonary;  Laterality: N/A;       Social History     Tobacco Use    Smoking status: Every Day     Packs/day: 1.00     Years: 55.00     Pack years: 55.00     Types: Cigarettes    Smokeless tobacco: Never   Substance Use Topics    Alcohol use: Not Currently     Comment: social    Drug use: Never       Cancer-related family history is not on file.    Current Outpatient Medications on File Prior to Visit   Medication Sig Dispense Refill    amLODIPine (NORVASC) 10 MG tablet Take 1 tablet (10 mg total) by mouth once daily. 30 tablet 3    atorvastatin (LIPITOR) 20 MG tablet TAKE 1 TABLET BY MOUTH EVERY EVENING 90 tablet 3    ipratropium-albuteroL (COMBIVENT)  mcg/actuation inhaler Inhale 2 puffs into the lungs every 6 (six) hours as needed for Wheezing. Rescue 4 g 0    ipratropium-albuteroL (COMBIVENT)  mcg/actuation inhaler Inhale 2 puffs into the lungs every 6 (six) hours as needed for Wheezing. Rescue 4 g 0    losartan (COZAAR) 100 MG tablet Take 1 tablet (100 mg total) by mouth once daily. 90 tablet 3    nicotine (NICODERM CQ) 14 mg/24 hr Place 1 patch onto the skin once daily. 14 patch 0    pantoprazole (PROTONIX) 20 MG tablet Take 1 tablet (20 mg total) by mouth once daily. 90 tablet 0    varenicline (CHANTIX) 1 mg Tab Take 1 tablet twice daily until complete. 56 tablet 0     No current facility-administered medications on file prior to visit.       Review of patient's allergies indicates:   Allergen Reactions    Codeine Itching         Vital Signs: /84 (BP Location: Right arm, Patient Position: Sitting)   Pulse 87   Temp 96.8 °F (36 °C)   Resp 17   Ht 5' 6" (1.676 m)   Wt 96.8 kg (213 lb 6.5 oz)   " SpO2 95%   BMI 34.44 kg/m²     ECOG Performance Status: 0 - Fully Active    Physical Exam  Vitals reviewed.   Constitutional:       Appearance: Normal appearance.   HENT:      Head: Normocephalic and atraumatic.   Eyes:      General: No scleral icterus.     Extraocular Movements: Extraocular movements intact.   Pulmonary:      Effort: Pulmonary effort is normal. No respiratory distress.   Abdominal:      General: There is no distension.   Musculoskeletal:      Cervical back: Neck supple.   Lymphadenopathy:      Cervical: No cervical adenopathy.   Skin:     General: Skin is dry.      Coloration: Skin is not jaundiced.   Neurological:      Mental Status: He is alert and oriented to person, place, and time.      Cranial Nerves: No cranial nerve deficit.   Psychiatric:         Mood and Affect: Mood normal.         Behavior: Behavior normal.         Judgment: Judgment normal.        Labs:    Imaging: I have personally reviewed the patient's available images and reports and summarized pertinent findings above in HPI.     Pathology: I have personally reviewed the patient's available pathology and summarized pertinent findings above in HPI.       - Thank you for allowing me to participate in the care of your patient.    Aguilar Ramirez MD, PhD

## 2022-12-14 ENCOUNTER — OFFICE VISIT (OUTPATIENT)
Dept: RADIATION ONCOLOGY | Facility: CLINIC | Age: 67
End: 2022-12-14
Payer: MEDICARE

## 2022-12-14 VITALS
HEART RATE: 87 BPM | TEMPERATURE: 97 F | WEIGHT: 213.38 LBS | RESPIRATION RATE: 17 BRPM | BODY MASS INDEX: 34.29 KG/M2 | HEIGHT: 66 IN | SYSTOLIC BLOOD PRESSURE: 138 MMHG | DIASTOLIC BLOOD PRESSURE: 84 MMHG | OXYGEN SATURATION: 95 %

## 2022-12-14 DIAGNOSIS — C34.11 MALIGNANT NEOPLASM OF RIGHT UPPER LOBE OF LUNG: ICD-10-CM

## 2022-12-14 DIAGNOSIS — R91.1 LUNG NODULE: ICD-10-CM

## 2022-12-14 PROCEDURE — 99999 PR PBB SHADOW E&M-EST. PATIENT-LVL IV: ICD-10-PCS | Mod: PBBFAC,HCNC,, | Performed by: RADIOLOGY

## 2022-12-14 PROCEDURE — 1159F PR MEDICATION LIST DOCUMENTED IN MEDICAL RECORD: ICD-10-PCS | Mod: HCNC,CPTII,S$GLB, | Performed by: RADIOLOGY

## 2022-12-14 PROCEDURE — 1159F MED LIST DOCD IN RCRD: CPT | Mod: HCNC,CPTII,S$GLB, | Performed by: RADIOLOGY

## 2022-12-14 PROCEDURE — 99499 RISK ADDL DX/OHS AUDIT: ICD-10-PCS | Mod: HCNC,S$GLB,, | Performed by: RADIOLOGY

## 2022-12-14 PROCEDURE — 99999 PR PBB SHADOW E&M-EST. PATIENT-LVL IV: CPT | Mod: PBBFAC,HCNC,, | Performed by: RADIOLOGY

## 2022-12-14 PROCEDURE — 3288F PR FALLS RISK ASSESSMENT DOCUMENTED: ICD-10-PCS | Mod: HCNC,CPTII,S$GLB, | Performed by: RADIOLOGY

## 2022-12-14 PROCEDURE — 1126F PR PAIN SEVERITY QUANTIFIED, NO PAIN PRESENT: ICD-10-PCS | Mod: HCNC,CPTII,S$GLB, | Performed by: RADIOLOGY

## 2022-12-14 PROCEDURE — 3008F PR BODY MASS INDEX (BMI) DOCUMENTED: ICD-10-PCS | Mod: HCNC,CPTII,S$GLB, | Performed by: RADIOLOGY

## 2022-12-14 PROCEDURE — 99205 OFFICE O/P NEW HI 60 MIN: CPT | Mod: HCNC,S$GLB,, | Performed by: RADIOLOGY

## 2022-12-14 PROCEDURE — 3079F PR MOST RECENT DIASTOLIC BLOOD PRESSURE 80-89 MM HG: ICD-10-PCS | Mod: HCNC,CPTII,S$GLB, | Performed by: RADIOLOGY

## 2022-12-14 PROCEDURE — 4010F ACE/ARB THERAPY RXD/TAKEN: CPT | Mod: HCNC,CPTII,S$GLB, | Performed by: RADIOLOGY

## 2022-12-14 PROCEDURE — 3075F PR MOST RECENT SYSTOLIC BLOOD PRESS GE 130-139MM HG: ICD-10-PCS | Mod: HCNC,CPTII,S$GLB, | Performed by: RADIOLOGY

## 2022-12-14 PROCEDURE — 99205 PR OFFICE/OUTPT VISIT, NEW, LEVL V, 60-74 MIN: ICD-10-PCS | Mod: HCNC,S$GLB,, | Performed by: RADIOLOGY

## 2022-12-14 PROCEDURE — 4010F PR ACE/ARB THEARPY RXD/TAKEN: ICD-10-PCS | Mod: HCNC,CPTII,S$GLB, | Performed by: RADIOLOGY

## 2022-12-14 PROCEDURE — 3008F BODY MASS INDEX DOCD: CPT | Mod: HCNC,CPTII,S$GLB, | Performed by: RADIOLOGY

## 2022-12-14 PROCEDURE — 3075F SYST BP GE 130 - 139MM HG: CPT | Mod: HCNC,CPTII,S$GLB, | Performed by: RADIOLOGY

## 2022-12-14 PROCEDURE — 1126F AMNT PAIN NOTED NONE PRSNT: CPT | Mod: HCNC,CPTII,S$GLB, | Performed by: RADIOLOGY

## 2022-12-14 PROCEDURE — 1101F PR PT FALLS ASSESS DOC 0-1 FALLS W/OUT INJ PAST YR: ICD-10-PCS | Mod: HCNC,CPTII,S$GLB, | Performed by: RADIOLOGY

## 2022-12-14 PROCEDURE — 3288F FALL RISK ASSESSMENT DOCD: CPT | Mod: HCNC,CPTII,S$GLB, | Performed by: RADIOLOGY

## 2022-12-14 PROCEDURE — 99499 UNLISTED E&M SERVICE: CPT | Mod: HCNC,S$GLB,, | Performed by: RADIOLOGY

## 2022-12-14 PROCEDURE — 1101F PT FALLS ASSESS-DOCD LE1/YR: CPT | Mod: HCNC,CPTII,S$GLB, | Performed by: RADIOLOGY

## 2022-12-14 PROCEDURE — 3079F DIAST BP 80-89 MM HG: CPT | Mod: HCNC,CPTII,S$GLB, | Performed by: RADIOLOGY

## 2022-12-15 ENCOUNTER — HOSPITAL ENCOUNTER (OUTPATIENT)
Dept: RADIATION THERAPY | Facility: HOSPITAL | Age: 67
Discharge: HOME | End: 2022-12-15
Attending: RADIOLOGY
Payer: MEDICARE

## 2022-12-15 PROCEDURE — 77334 RADIATION TREATMENT AID(S): CPT | Mod: 26,HCNC,, | Performed by: RADIOLOGY

## 2022-12-15 PROCEDURE — 77014 HC CT GUIDANCE RADIATION THERAPY FLDS PLACEMENT: CPT | Mod: TC,HCNC | Performed by: RADIOLOGY

## 2022-12-15 PROCEDURE — 77334 PR  RADN TREATMENT AID(S) COMPLX: ICD-10-PCS | Mod: 26,HCNC,, | Performed by: RADIOLOGY

## 2022-12-15 PROCEDURE — 77014 PR  CT GUIDANCE PLACEMENT RAD THERAPY FIELDS: ICD-10-PCS | Mod: 26,HCNC,, | Performed by: RADIOLOGY

## 2022-12-15 PROCEDURE — 77014 PR  CT GUIDANCE PLACEMENT RAD THERAPY FIELDS: CPT | Mod: 26,HCNC,, | Performed by: RADIOLOGY

## 2022-12-15 PROCEDURE — 77334 RADIATION TREATMENT AID(S): CPT | Mod: TC,HCNC | Performed by: RADIOLOGY

## 2022-12-15 PROCEDURE — 77263 THER RADIOLOGY TX PLNG CPLX: CPT | Mod: HCNC,,, | Performed by: RADIOLOGY

## 2022-12-15 PROCEDURE — 77263 PR  RADIATION THERAPY PLAN COMPLEX: ICD-10-PCS | Mod: HCNC,,, | Performed by: RADIOLOGY

## 2022-12-19 ENCOUNTER — TELEPHONE (OUTPATIENT)
Dept: SMOKING CESSATION | Facility: CLINIC | Age: 67
End: 2022-12-19
Payer: MEDICARE

## 2022-12-19 ENCOUNTER — CLINICAL SUPPORT (OUTPATIENT)
Dept: SMOKING CESSATION | Facility: CLINIC | Age: 67
End: 2022-12-19
Payer: COMMERCIAL

## 2022-12-19 DIAGNOSIS — F17.200 NICOTINE DEPENDENCE: Primary | ICD-10-CM

## 2022-12-19 PROCEDURE — 99404 PR PREVENT COUNSEL,INDIV,60 MIN: ICD-10-PCS | Mod: S$GLB,,,

## 2022-12-19 PROCEDURE — 99404 PREV MED CNSL INDIV APPRX 60: CPT | Mod: S$GLB,,,

## 2022-12-19 RX ORDER — VARENICLINE TARTRATE 1 MG/1
TABLET, FILM COATED ORAL
Qty: 56 TABLET | Refills: 0 | Status: SHIPPED | OUTPATIENT
Start: 2022-12-26 | End: 2023-01-24 | Stop reason: SDUPTHER

## 2022-12-19 NOTE — TELEPHONE ENCOUNTER
Smoking [facility]ation [facility]-called patient for telephonic follow up, no answer.  Left message and contact number was given.

## 2022-12-19 NOTE — PROGRESS NOTES
Individual Follow-Up Form    12/19/2022    Quit Date:     Clinical Status of Patient: Outpatient    Continuing Medication: yes  Wellbutrin or Patches     Target Symptoms: Withdrawal and medication side effects. The following were  rated moderate (3) to severe (4) on TCRS:  Moderate (3): none  Severe (4): none    Comments: Patient was seen in clinic today for follow up.  Patient came in today instead of telephone visit due to his surgery was canceled.  Patient continues  to smoke  3 cigarettes per day.  Patient remains on the prescribed tobacco cessation medication regimen of 1 mg Chantix BID  and 14 mg nicotine patch without any side effects at this time.  Refill  of Chantix sent to pharmacy.  Patients reports not going around his brother who smokes.  We discussed the importance of setting a quit date.  Patient's goal is to quit by New Year.  We reviewed the importance of quitting and health benefits to expect.  We discussed willpower and willingness to quit.  Discussed changing habitual behavior  and using 15 minutes delay to break routine tobacco use.  The patient denies any abnormal behavior or mental changes at this time.  Will do a telephone visit on January 03, 2023.   Prescribed medication management will be by physician.  Patient will continue with sessions and medication monitoring by CTTS.        Diagnosis: F17.200    Next Visit: 2 weeks

## 2022-12-19 NOTE — Clinical Note
Patient was seen in clinic today for follow up.  Patient came in today instead of telephone visit due to his surgery was canceled.  Patient continues  to smoke  3 cigarettes per day.  Patient remains on the prescribed tobacco cessation medication regimen of 1 mg Chantix BID  and 14 mg nicotine patch without any side effects at this time.  Refill  of Chantix sent to pharmacy.  Patients reports not going around his brother who smokes.  We discussed the importance of setting a quit date.  Patient's goal is to quit by New Year.  We reviewed the importance of quitting and health benefits to expect.  We discussed willpower and willingness to quit.  Discussed changing habitual behavior  and using 15 minutes delay to break routine tobacco use.  The patient denies any abnormal behavior or mental changes at this time.  Will do a telephone visit on January 03, 2023.   Prescribed medication management will be by physician.  Patient will continue with sessions and medication monitoring by CTTS.

## 2022-12-26 PROCEDURE — 77293 RESPIRATOR MOTION MGMT SIMUL: CPT | Mod: 26,HCNC,, | Performed by: RADIOLOGY

## 2022-12-26 PROCEDURE — 77301 RADIOTHERAPY DOSE PLAN IMRT: CPT | Mod: 26,HCNC,, | Performed by: RADIOLOGY

## 2022-12-26 PROCEDURE — 77301 RADIOTHERAPY DOSE PLAN IMRT: CPT | Mod: TC,HCNC | Performed by: RADIOLOGY

## 2022-12-26 PROCEDURE — 77293 RESPIRATOR MOTION MGMT SIMUL: CPT | Mod: TC,HCNC | Performed by: RADIOLOGY

## 2022-12-26 PROCEDURE — 77293 PR RESPIRATORY MOTION MGMT SIMULATION: ICD-10-PCS | Mod: 26,HCNC,, | Performed by: RADIOLOGY

## 2022-12-26 PROCEDURE — 77301 PR  INTEN MOD RADIOTHER PLAN W/DOSE VOL HIST: ICD-10-PCS | Mod: 26,HCNC,, | Performed by: RADIOLOGY

## 2022-12-27 PROCEDURE — 77370 RADIATION PHYSICS CONSULT: CPT | Mod: HCNC | Performed by: RADIOLOGY

## 2022-12-27 PROCEDURE — 77338 PR  MLC IMRT DESIGN & CONSTRUCTION PER IMRT PLAN: ICD-10-PCS | Mod: 26,HCNC,, | Performed by: RADIOLOGY

## 2022-12-27 PROCEDURE — 77300 PR RADIATION THERAPY,DOSIMETRY PLAN: ICD-10-PCS | Mod: 26,HCNC,, | Performed by: RADIOLOGY

## 2022-12-27 PROCEDURE — 77300 RADIATION THERAPY DOSE PLAN: CPT | Mod: 26,HCNC,, | Performed by: RADIOLOGY

## 2022-12-27 PROCEDURE — 77338 DESIGN MLC DEVICE FOR IMRT: CPT | Mod: 26,HCNC,, | Performed by: RADIOLOGY

## 2022-12-27 PROCEDURE — 77338 DESIGN MLC DEVICE FOR IMRT: CPT | Mod: TC,HCNC | Performed by: RADIOLOGY

## 2022-12-27 PROCEDURE — 77300 RADIATION THERAPY DOSE PLAN: CPT | Mod: TC,HCNC | Performed by: RADIOLOGY

## 2022-12-28 PROCEDURE — 77014 HC CT GUIDANCE RADIATION THERAPY FLDS PLACEMENT: CPT | Mod: TC,HCNC | Performed by: RADIOLOGY

## 2022-12-28 PROCEDURE — 77014 PR  CT GUIDANCE PLACEMENT RAD THERAPY FIELDS: ICD-10-PCS | Mod: 26,HCNC,, | Performed by: RADIOLOGY

## 2022-12-28 PROCEDURE — 77470 SPECIAL RADIATION TREATMENT: CPT | Mod: 26,59,HCNC, | Performed by: RADIOLOGY

## 2022-12-28 PROCEDURE — 77373 STRTCTC BDY RAD THER TX DLVR: CPT | Mod: HCNC | Performed by: RADIOLOGY

## 2022-12-28 PROCEDURE — 77014 PR  CT GUIDANCE PLACEMENT RAD THERAPY FIELDS: CPT | Mod: 26,HCNC,, | Performed by: RADIOLOGY

## 2022-12-28 PROCEDURE — 77470 PR  SPECIAL RADIATION TREATMENT: ICD-10-PCS | Mod: 26,59,HCNC, | Performed by: RADIOLOGY

## 2022-12-28 PROCEDURE — 77470 SPECIAL RADIATION TREATMENT: CPT | Mod: 59,TC,HCNC | Performed by: RADIOLOGY

## 2022-12-30 DIAGNOSIS — C34.11 MALIGNANT NEOPLASM OF RIGHT UPPER LOBE OF LUNG: Primary | ICD-10-CM

## 2022-12-30 PROCEDURE — 77014 HC CT GUIDANCE RADIATION THERAPY FLDS PLACEMENT: CPT | Mod: TC,HCNC | Performed by: RADIOLOGY

## 2022-12-30 PROCEDURE — 77014 PR  CT GUIDANCE PLACEMENT RAD THERAPY FIELDS: CPT | Mod: 26,HCNC,, | Performed by: RADIOLOGY

## 2022-12-30 PROCEDURE — 77014 PR  CT GUIDANCE PLACEMENT RAD THERAPY FIELDS: ICD-10-PCS | Mod: 26,HCNC,, | Performed by: RADIOLOGY

## 2022-12-30 PROCEDURE — 77373 STRTCTC BDY RAD THER TX DLVR: CPT | Mod: HCNC | Performed by: RADIOLOGY

## 2023-01-03 ENCOUNTER — CLINICAL SUPPORT (OUTPATIENT)
Dept: SMOKING CESSATION | Facility: CLINIC | Age: 68
End: 2023-01-03
Payer: COMMERCIAL

## 2023-01-03 ENCOUNTER — HOSPITAL ENCOUNTER (OUTPATIENT)
Dept: RADIATION THERAPY | Facility: HOSPITAL | Age: 68
Discharge: HOME | End: 2023-01-03
Attending: RADIOLOGY
Payer: MEDICARE

## 2023-01-03 DIAGNOSIS — F17.200 NICOTINE DEPENDENCE: Primary | ICD-10-CM

## 2023-01-03 PROCEDURE — 77014 PR  CT GUIDANCE PLACEMENT RAD THERAPY FIELDS: ICD-10-PCS | Mod: 26,HCNC,, | Performed by: RADIOLOGY

## 2023-01-03 PROCEDURE — 77014 HC CT GUIDANCE RADIATION THERAPY FLDS PLACEMENT: CPT | Mod: TC,HCNC,59 | Performed by: RADIOLOGY

## 2023-01-03 PROCEDURE — 77373 STRTCTC BDY RAD THER TX DLVR: CPT | Mod: HCNC | Performed by: RADIOLOGY

## 2023-01-03 PROCEDURE — 99402 PREV MED CNSL INDIV APPRX 30: CPT | Mod: S$GLB,,,

## 2023-01-03 PROCEDURE — 99402 PR PREVENT COUNSEL,INDIV,30 MIN: ICD-10-PCS | Mod: S$GLB,,,

## 2023-01-03 PROCEDURE — 77014 PR  CT GUIDANCE PLACEMENT RAD THERAPY FIELDS: CPT | Mod: 26,HCNC,, | Performed by: RADIOLOGY

## 2023-01-03 NOTE — PROGRESS NOTES
Individual Follow-Up Form    1/3/2023    Quit Date: 12-    Clinical Status of Patient: Outpatient    Continuing Medication: yes  Chantix or Patches     Target Symptoms: Withdrawal and medication side effects. The following were  rated moderate (3) to severe (4) on TCRS:  Moderate (3): none  Severe (4): none    Comments: Patient was seen in clinic today for follow up.  Patient remains tobacco free since December 30, 2022.  Commended patient on the accomplishment.  Patient remains on the prescribed tobacco cessation medication regimen of 1 mg Chantix BID and 14 mg nicotine patches without any side effects at this time.  No refill is needed.    Patient reports feeling a little irritable.  Completion of TCRS (Tobacco Cessation Rating Scale) reviewed strategies, habitual behavior, high risks situations, understanding urges and cravings, stress and relaxation with open discussion and additional interventions. Introduced lapses, relapses, understanding them and analyzing the situation of a lapse, conflict issues that may be linked to a lapse.  Prescribed medication management will be by physician.  Patient will continue with sessions and medication monitoring by CTTS.      Diagnosis: F17.200    Next Visit: 2 weeks

## 2023-01-04 PROCEDURE — 77336 RADIATION PHYSICS CONSULT: CPT | Mod: HCNC | Performed by: RADIOLOGY

## 2023-01-09 ENCOUNTER — CLINICAL SUPPORT (OUTPATIENT)
Dept: SMOKING CESSATION | Facility: CLINIC | Age: 68
End: 2023-01-09
Payer: COMMERCIAL

## 2023-01-09 ENCOUNTER — OFFICE VISIT (OUTPATIENT)
Dept: PRIMARY CARE CLINIC | Facility: CLINIC | Age: 68
End: 2023-01-09
Payer: MEDICARE

## 2023-01-09 VITALS
WEIGHT: 211.19 LBS | BODY MASS INDEX: 33.94 KG/M2 | HEART RATE: 79 BPM | SYSTOLIC BLOOD PRESSURE: 131 MMHG | OXYGEN SATURATION: 96 % | HEIGHT: 66 IN | DIASTOLIC BLOOD PRESSURE: 73 MMHG

## 2023-01-09 DIAGNOSIS — J44.9 COPD, SEVERITY TO BE DETERMINED: ICD-10-CM

## 2023-01-09 DIAGNOSIS — F17.200 NICOTINE DEPENDENCE: Primary | ICD-10-CM

## 2023-01-09 DIAGNOSIS — I10 ESSENTIAL HYPERTENSION: Primary | ICD-10-CM

## 2023-01-09 DIAGNOSIS — R91.8 LUNG MASS: ICD-10-CM

## 2023-01-09 PROCEDURE — 99404 PREV MED CNSL INDIV APPRX 60: CPT | Mod: S$GLB,,,

## 2023-01-09 PROCEDURE — 3078F PR MOST RECENT DIASTOLIC BLOOD PRESSURE < 80 MM HG: ICD-10-PCS | Mod: HCNC,CPTII,S$GLB, | Performed by: STUDENT IN AN ORGANIZED HEALTH CARE EDUCATION/TRAINING PROGRAM

## 2023-01-09 PROCEDURE — 3078F DIAST BP <80 MM HG: CPT | Mod: HCNC,CPTII,S$GLB, | Performed by: STUDENT IN AN ORGANIZED HEALTH CARE EDUCATION/TRAINING PROGRAM

## 2023-01-09 PROCEDURE — 3008F BODY MASS INDEX DOCD: CPT | Mod: HCNC,CPTII,S$GLB, | Performed by: STUDENT IN AN ORGANIZED HEALTH CARE EDUCATION/TRAINING PROGRAM

## 2023-01-09 PROCEDURE — 99999 PR PBB SHADOW E&M-EST. PATIENT-LVL IV: CPT | Mod: PBBFAC,HCNC,, | Performed by: STUDENT IN AN ORGANIZED HEALTH CARE EDUCATION/TRAINING PROGRAM

## 2023-01-09 PROCEDURE — 3008F PR BODY MASS INDEX (BMI) DOCUMENTED: ICD-10-PCS | Mod: HCNC,CPTII,S$GLB, | Performed by: STUDENT IN AN ORGANIZED HEALTH CARE EDUCATION/TRAINING PROGRAM

## 2023-01-09 PROCEDURE — 1101F PR PT FALLS ASSESS DOC 0-1 FALLS W/OUT INJ PAST YR: ICD-10-PCS | Mod: HCNC,CPTII,S$GLB, | Performed by: STUDENT IN AN ORGANIZED HEALTH CARE EDUCATION/TRAINING PROGRAM

## 2023-01-09 PROCEDURE — 1126F PR PAIN SEVERITY QUANTIFIED, NO PAIN PRESENT: ICD-10-PCS | Mod: HCNC,CPTII,S$GLB, | Performed by: STUDENT IN AN ORGANIZED HEALTH CARE EDUCATION/TRAINING PROGRAM

## 2023-01-09 PROCEDURE — 3075F SYST BP GE 130 - 139MM HG: CPT | Mod: HCNC,CPTII,S$GLB, | Performed by: STUDENT IN AN ORGANIZED HEALTH CARE EDUCATION/TRAINING PROGRAM

## 2023-01-09 PROCEDURE — 3075F PR MOST RECENT SYSTOLIC BLOOD PRESS GE 130-139MM HG: ICD-10-PCS | Mod: HCNC,CPTII,S$GLB, | Performed by: STUDENT IN AN ORGANIZED HEALTH CARE EDUCATION/TRAINING PROGRAM

## 2023-01-09 PROCEDURE — 3288F FALL RISK ASSESSMENT DOCD: CPT | Mod: HCNC,CPTII,S$GLB, | Performed by: STUDENT IN AN ORGANIZED HEALTH CARE EDUCATION/TRAINING PROGRAM

## 2023-01-09 PROCEDURE — 99214 PR OFFICE/OUTPT VISIT, EST, LEVL IV, 30-39 MIN: ICD-10-PCS | Mod: HCNC,S$GLB,, | Performed by: STUDENT IN AN ORGANIZED HEALTH CARE EDUCATION/TRAINING PROGRAM

## 2023-01-09 PROCEDURE — 3288F PR FALLS RISK ASSESSMENT DOCUMENTED: ICD-10-PCS | Mod: HCNC,CPTII,S$GLB, | Performed by: STUDENT IN AN ORGANIZED HEALTH CARE EDUCATION/TRAINING PROGRAM

## 2023-01-09 PROCEDURE — 1126F AMNT PAIN NOTED NONE PRSNT: CPT | Mod: HCNC,CPTII,S$GLB, | Performed by: STUDENT IN AN ORGANIZED HEALTH CARE EDUCATION/TRAINING PROGRAM

## 2023-01-09 PROCEDURE — 99999 PR PBB SHADOW E&M-EST. PATIENT-LVL IV: ICD-10-PCS | Mod: PBBFAC,HCNC,, | Performed by: STUDENT IN AN ORGANIZED HEALTH CARE EDUCATION/TRAINING PROGRAM

## 2023-01-09 PROCEDURE — 99404 PR PREVENT COUNSEL,INDIV,60 MIN: ICD-10-PCS | Mod: S$GLB,,,

## 2023-01-09 PROCEDURE — 99214 OFFICE O/P EST MOD 30 MIN: CPT | Mod: HCNC,S$GLB,, | Performed by: STUDENT IN AN ORGANIZED HEALTH CARE EDUCATION/TRAINING PROGRAM

## 2023-01-09 PROCEDURE — 1101F PT FALLS ASSESS-DOCD LE1/YR: CPT | Mod: HCNC,CPTII,S$GLB, | Performed by: STUDENT IN AN ORGANIZED HEALTH CARE EDUCATION/TRAINING PROGRAM

## 2023-01-09 RX ORDER — MODERNA COVID-19 VACCINE, BIVALENT 25; 25 UG/.5ML; UG/.5ML
INJECTION, SUSPENSION INTRAMUSCULAR
COMMUNITY
Start: 2022-10-06

## 2023-01-09 RX ORDER — INFLUENZA A VIRUS A/VICTORIA/2570/2019 IVR-215 (H1N1) ANTIGEN (FORMALDEHYDE INACTIVATED), INFLUENZA A VIRUS A/DARWIN/6/2021 IVR-227 (H3N2) ANTIGEN (FORMALDEHYDE INACTIVATED), INFLUENZA B VIRUS B/AUSTRIA/1359417/2021 BVR-26 ANTIGEN (FORMALDEHYDE INACTIVATED), INFLUENZA B VIRUS B/PHUKET/3073/2013 BVR-1B ANTIGEN (FORMALDEHYDE INACTIVATED) 15; 15; 15; 15 UG/.5ML; UG/.5ML; UG/.5ML; UG/.5ML
INJECTION, SUSPENSION INTRAMUSCULAR
COMMUNITY
Start: 2022-10-04

## 2023-01-09 RX ORDER — AMLODIPINE BESYLATE 10 MG/1
10 TABLET ORAL DAILY
Qty: 90 TABLET | Refills: 3 | Status: SHIPPED | OUTPATIENT
Start: 2023-01-09 | End: 2023-09-08

## 2023-01-09 NOTE — Clinical Note
Patient was seen in clinic today for follow up.  Patient remains tobacco free since December 30, 2022.  Commended patient on the accomplishment.  Patient is excited about his success.  Patient's CO monitor was 7 ppm. Patient remains on the prescribed tobacco cessation medication regimen of 1 mg Chantix BID without any side effects at this time. No refill is needed.   Patient reports eating more and some weight gain.  Completion of TCRS (Tobacco Cessation Rating Scale) reviewed strategies, cues, triggers, high risk situations, lapses, relapses, diet, exercise, stress, relaxation, sleep, habitual behavior and lifestyle changes.  Prescribed medication management will be by physician.  Patient will continue with sessions and medication monitoring by CTTS.

## 2023-01-09 NOTE — PATIENT INSTRUCTIONS
TO GET YOUR BLOOD PRESSURE MACHINE FROM [facility]:    If you have any questions regarding this program or would like more information, please visit their website at [email or web address] or call the [facility] Patient Support Line at [phone].

## 2023-01-09 NOTE — PROGRESS NOTES
01/09/2023    Gurwinder Hair  [id number]    Chief Complaint   Patient presents with    [facility]       [facility]    This patient is new to me and presents to transfer care. Pmh of lung CA recently dx and following with radiology oncology    Tobacco abuse  Is currently on his 10 day cig free; is having trouble being around people right now    HTN: amlodipine 10 mg and losartan 100 mg daily  Does not have BP cuff. Was supposed to enroll in digital medicine      Negative 10 point ROS outside of HPI    Social History     Socioeconomic History    Marital status:    Tobacco Use    Smoking status: Every Day     Packs/day: 1.00     Years: 55.00     Pack years: 55.00     Types: Cigarettes    Smokeless tobacco: Never   Substance and Sexual Activity    Alcohol use: Not Currently     Comment: social    Drug use: Never    Sexual activity: Not Currently   Social History Narrative    Tobacco: Initiated at age 13 yo; max use 1ppd    EtOH: None    Drug: None    Employment: Retired     Education: 1 year of college    Lives alone         2 adult children            Current Outpatient Medications:     atorvastatin (LIPITOR) 20 MG tablet, TAKE 1 TABLET BY MOUTH EVERY EVENING, Disp: 90 tablet, Rfl: 3    ipratropium-albuteroL (COMBIVENT)  mcg/actuation inhaler, Inhale 2 puffs into the lungs every 6 (six) hours as needed for Wheezing. Rescue, Disp: 4 g, Rfl: 0    ipratropium-albuteroL (COMBIVENT)  mcg/actuation inhaler, Inhale 2 puffs into the lungs every 6 (six) hours as needed for Wheezing. Rescue, Disp: 4 g, Rfl: 0    losartan (COZAAR) 100 MG tablet, Take 1 tablet (100 mg total) by mouth once daily., Disp: 90 tablet, Rfl: 3    nicotine (NICODERM CQ) 14 mg/24 hr, Place 1 patch onto the skin once daily., Disp: 14 patch, Rfl: 0    pantoprazole (PROTONIX) 20 MG tablet, Take 1 tablet (20 mg total) by mouth once daily., Disp: 90 tablet, Rfl: 0    varenicline (CHANTIX) 1 mg Tab, Take 1 tablet twice daily until  complete., Disp: 56 tablet, Rfl: 0    amLODIPine (NORVASC) 10 MG tablet, Take 1 tablet (10 mg total) by mouth once daily., Disp: 90 tablet, Rfl: 3    FLUAD QUAD 2022-23,65Y UP,,PF, 60 mcg (15 mcg x 4)/0.5 mL Syrg, , Disp: , Rfl:     MODERNA COVID BIVAL,6Y UP,,PF, 50 mcg/0.5 mL injection, , Disp: , Rfl:       Physical Exam  Vitals:    01/09/23 1025   BP: 131/73   Pulse: 79       GEN: NAD, AAox3, well nourished  HEENT: NCAT, EOMI, PEERL, no scleral injection, TM normal, moist mucous membranes, oropharynx clear, no erythema, no exudates  NECK: full rom, no cervical lymphadenopathy, no thyroidmegally  CV: RRR, no m/r/g, trace LE edema  LUNGS: CTAB, non-labored breathing, no wheezes, no crackles  ABD: soft, non-distended, no rebound/guarding, no organomegaly  EXT: n c/c/e, warm, 5/5 UE and LE strength  NEURO: CNII-XII intact no focal deficit  PSYCH: nl affect, no hallucinations, nl speech  Skin: intact, no rashes/lesions/erythema      1. Essential hypertension  - amLODIPine (NORVASC) 10 MG tablet; Take 1 tablet (10 mg total) by mouth once daily.  Dispense: 90 tablet; Refill: 3    2. COPD, severity to be determined  Controlled; continue smoking cessation    3. Lung mass  Management per Rad Onc    RTC in 4-6 months    Mercedes Acharya MD  Family Medicine

## 2023-01-09 NOTE — PROGRESS NOTES
Individual Follow-Up Form    1/9/2023    Quit Date: 12-    Clinical Status of Patient: Outpatient    Continuing Medication: Yes, Chantix     Target Symptoms: Withdrawal and medication side effects. The following were  rated moderate (3) to severe (4) on TCRS:  Moderate (3): none  Severe (4): none    Comments: Patient was seen in clinic today for follow up.  Patient remains tobacco free since December 30, 2022.  Commended patient on the accomplishment.  Patient is excited about his success.  Patient's CO monitor was 7 ppm. Patient remains on the prescribed tobacco cessation medication regimen of 1 mg Chantix BID without any side effects at this time. No refill is needed.   Patient reports eating more and some weight gain.  Completion of TCRS (Tobacco Cessation Rating Scale) reviewed strategies, cues, triggers, high risk situations, lapses, relapses, diet, exercise, stress, relaxation, sleep, habitual behavior and lifestyle changes.  Prescribed medication management will be by physician.  Patient will continue with sessions and medication monitoring by CTTS.      Diagnosis: F17.200    Next Visit: 2 weeks

## 2023-01-12 ENCOUNTER — TELEPHONE (OUTPATIENT)
Dept: SMOKING CESSATION | Facility: CLINIC | Age: 68
End: 2023-01-12
Payer: MEDICARE

## 2023-01-24 ENCOUNTER — CLINICAL SUPPORT (OUTPATIENT)
Dept: SMOKING CESSATION | Facility: CLINIC | Age: 68
End: 2023-01-24
Payer: COMMERCIAL

## 2023-01-24 DIAGNOSIS — F17.200 NICOTINE DEPENDENCE: Primary | ICD-10-CM

## 2023-01-24 PROCEDURE — 99404 PREV MED CNSL INDIV APPRX 60: CPT | Mod: S$GLB,,,

## 2023-01-24 PROCEDURE — 99404 PR PREVENT COUNSEL,INDIV,60 MIN: ICD-10-PCS | Mod: S$GLB,,,

## 2023-01-24 RX ORDER — VARENICLINE TARTRATE 1 MG/1
TABLET, FILM COATED ORAL
Qty: 56 TABLET | Refills: 0 | Status: SHIPPED | OUTPATIENT
Start: 2023-01-24 | End: 2023-05-16 | Stop reason: ALTCHOICE

## 2023-01-24 NOTE — PROGRESS NOTES
Individual Follow-Up Form    1/24/2023    Quit Date: 12-    Clinical Status of Patient: Outpatient    Continuing Medication: yes  Chantix     Target Symptoms: Withdrawal and medication side effects. The following were  rated moderate (3) to severe (4) on TCRS:  Moderate (3): none  Severe (4): none    Comments: Patient was seen in clinic today for follow up.  Patient remains tobacco free since December 30, 2022.  Patient reports a lapse smoked 2 -3 cigarettes during his birthday celebration.  Patient's CO monitor was 5 ppm.  Patient remains on the prescribed tobacco cessation medication regimen of 1 mg Chantix BID without any side effects at this time.  Refill sent to pharmacy.    Patient reports feeling irritable. Completion of TCRS (Tobacco Cessation Rating Scale) reviewed strategies, cues, triggers, high risk situations, lapses, relapses, diet, exercise, stress, relaxation, sleep, habitual behavior and lifestyle changes.  Prescribed medication management will be by physician.  Patient will continue with sessions and medication monitoring by CTTS.      Diagnosis: F17.200    Next Visit: 2 weeks

## 2023-01-24 NOTE — Clinical Note
Patient was seen in clinic today for follow up.  Patient remains tobacco free since December 30, 2022.  Patient reports a lapse smoked 2 -3 cigarettes during his birthday celebration.  Patient's CO monitor was 5 ppm.  Patient remains on the prescribed tobacco cessation medication regimen of 1 mg Chantix BID without any side effects at this time.  Refill sent to pharmacy.    Patient reports feeling irritable. Completion of TCRS (Tobacco Cessation Rating Scale) reviewed strategies, cues, triggers, high risk situations, lapses, relapses, diet, exercise, stress, relaxation, sleep, habitual behavior and lifestyle changes.  Prescribed medication management will be by physician.  Patient will continue with sessions and medication monitoring by CTTS.

## 2023-01-26 ENCOUNTER — TELEPHONE (OUTPATIENT)
Dept: RADIATION ONCOLOGY | Facility: CLINIC | Age: 68
End: 2023-01-26
Payer: MEDICARE

## 2023-01-26 NOTE — TELEPHONE ENCOUNTER
Followup call after completing radiation  to the lung states he is feeling well f/u appt confirmed

## 2023-01-26 NOTE — TELEPHONE ENCOUNTER
----- Message from Miladis Jaimes RN sent at 1/6/2023  2:25 PM CST -----  Sbrt lung c 1/3 f/u made cs

## 2023-01-31 ENCOUNTER — OFFICE VISIT (OUTPATIENT)
Dept: CARDIOLOGY | Facility: CLINIC | Age: 68
End: 2023-01-31
Payer: MEDICARE

## 2023-01-31 VITALS
SYSTOLIC BLOOD PRESSURE: 126 MMHG | WEIGHT: 208.44 LBS | DIASTOLIC BLOOD PRESSURE: 70 MMHG | HEART RATE: 89 BPM | HEIGHT: 66 IN | OXYGEN SATURATION: 98 % | BODY MASS INDEX: 33.5 KG/M2

## 2023-01-31 DIAGNOSIS — G47.30 SLEEP APNEA, UNSPECIFIED TYPE: ICD-10-CM

## 2023-01-31 DIAGNOSIS — E78.5 HYPERLIPIDEMIA, UNSPECIFIED HYPERLIPIDEMIA TYPE: ICD-10-CM

## 2023-01-31 DIAGNOSIS — Z72.0 TOBACCO ABUSE: ICD-10-CM

## 2023-01-31 DIAGNOSIS — I10 ESSENTIAL HYPERTENSION: ICD-10-CM

## 2023-01-31 DIAGNOSIS — I71.43 INFRARENAL ABDOMINAL AORTIC ANEURYSM (AAA) WITHOUT RUPTURE: Primary | ICD-10-CM

## 2023-01-31 PROCEDURE — 1101F PT FALLS ASSESS-DOCD LE1/YR: CPT | Mod: HCNC,CPTII,S$GLB, | Performed by: INTERNAL MEDICINE

## 2023-01-31 PROCEDURE — 3074F SYST BP LT 130 MM HG: CPT | Mod: HCNC,CPTII,S$GLB, | Performed by: INTERNAL MEDICINE

## 2023-01-31 PROCEDURE — 1126F AMNT PAIN NOTED NONE PRSNT: CPT | Mod: HCNC,CPTII,S$GLB, | Performed by: INTERNAL MEDICINE

## 2023-01-31 PROCEDURE — 3288F FALL RISK ASSESSMENT DOCD: CPT | Mod: HCNC,CPTII,S$GLB, | Performed by: INTERNAL MEDICINE

## 2023-01-31 PROCEDURE — 1101F PR PT FALLS ASSESS DOC 0-1 FALLS W/OUT INJ PAST YR: ICD-10-PCS | Mod: HCNC,CPTII,S$GLB, | Performed by: INTERNAL MEDICINE

## 2023-01-31 PROCEDURE — 1126F PR PAIN SEVERITY QUANTIFIED, NO PAIN PRESENT: ICD-10-PCS | Mod: HCNC,CPTII,S$GLB, | Performed by: INTERNAL MEDICINE

## 2023-01-31 PROCEDURE — 99214 OFFICE O/P EST MOD 30 MIN: CPT | Mod: 25,HCNC,S$GLB, | Performed by: INTERNAL MEDICINE

## 2023-01-31 PROCEDURE — 3008F BODY MASS INDEX DOCD: CPT | Mod: HCNC,CPTII,S$GLB, | Performed by: INTERNAL MEDICINE

## 2023-01-31 PROCEDURE — 99214 PR OFFICE/OUTPT VISIT, EST, LEVL IV, 30-39 MIN: ICD-10-PCS | Mod: 25,HCNC,S$GLB, | Performed by: INTERNAL MEDICINE

## 2023-01-31 PROCEDURE — 93000 EKG 12-LEAD: ICD-10-PCS | Mod: HCNC,S$GLB,, | Performed by: INTERNAL MEDICINE

## 2023-01-31 PROCEDURE — 3078F PR MOST RECENT DIASTOLIC BLOOD PRESSURE < 80 MM HG: ICD-10-PCS | Mod: HCNC,CPTII,S$GLB, | Performed by: INTERNAL MEDICINE

## 2023-01-31 PROCEDURE — 99999 PR PBB SHADOW E&M-EST. PATIENT-LVL IV: ICD-10-PCS | Mod: PBBFAC,HCNC,, | Performed by: INTERNAL MEDICINE

## 2023-01-31 PROCEDURE — 1159F PR MEDICATION LIST DOCUMENTED IN MEDICAL RECORD: ICD-10-PCS | Mod: HCNC,CPTII,S$GLB, | Performed by: INTERNAL MEDICINE

## 2023-01-31 PROCEDURE — 3008F PR BODY MASS INDEX (BMI) DOCUMENTED: ICD-10-PCS | Mod: HCNC,CPTII,S$GLB, | Performed by: INTERNAL MEDICINE

## 2023-01-31 PROCEDURE — 3078F DIAST BP <80 MM HG: CPT | Mod: HCNC,CPTII,S$GLB, | Performed by: INTERNAL MEDICINE

## 2023-01-31 PROCEDURE — 3074F PR MOST RECENT SYSTOLIC BLOOD PRESSURE < 130 MM HG: ICD-10-PCS | Mod: HCNC,CPTII,S$GLB, | Performed by: INTERNAL MEDICINE

## 2023-01-31 PROCEDURE — 93000 ELECTROCARDIOGRAM COMPLETE: CPT | Mod: HCNC,S$GLB,, | Performed by: INTERNAL MEDICINE

## 2023-01-31 PROCEDURE — 3288F PR FALLS RISK ASSESSMENT DOCUMENTED: ICD-10-PCS | Mod: HCNC,CPTII,S$GLB, | Performed by: INTERNAL MEDICINE

## 2023-01-31 PROCEDURE — 1159F MED LIST DOCD IN RCRD: CPT | Mod: HCNC,CPTII,S$GLB, | Performed by: INTERNAL MEDICINE

## 2023-01-31 PROCEDURE — 99999 PR PBB SHADOW E&M-EST. PATIENT-LVL IV: CPT | Mod: PBBFAC,HCNC,, | Performed by: INTERNAL MEDICINE

## 2023-01-31 NOTE — PROGRESS NOTES
Cardiology    1/31/2023  9:51 AM    Problem list  Patient Active Problem List   Diagnosis    Other closed fractures of distal end of radius (alone)    Essential hypertension    Hyperlipidemia    Stage 3b chronic kidney disease    Tobacco abuse    History of bacterial pneumonia    History of solitary pulmonary nodule    Abdominal aortic aneurysm (AAA) without rupture    COPD, severity to be determined    CRI (chronic renal insufficiency)    Other chest pain    Gastroesophageal reflux disease without esophagitis    Lung nodule    Malignant neoplasm of right upper lobe of lung       CC:  F/u    HPI:  Follow-up follow-up he is here for follow-up.  He denies any chest pain or shortness of breath.  He could not complete enroll in the hypertension [employer] medicine program.  He has cut down smoking to 3-4 cigarettes per day.    Medications  Current Outpatient Medications   Medication Sig Dispense Refill    amLODIPine (NORVASC) 10 MG tablet Take 1 tablet (10 mg total) by mouth once daily. 90 tablet 3    atorvastatin (LIPITOR) 20 MG tablet TAKE 1 TABLET BY MOUTH EVERY EVENING 90 tablet 3    ipratropium-albuteroL (COMBIVENT)  mcg/actuation inhaler Inhale 2 puffs into the lungs every 6 (six) hours as needed for Wheezing. Rescue 4 g 0    ipratropium-albuteroL (COMBIVENT)  mcg/actuation inhaler Inhale 2 puffs into the lungs every 6 (six) hours as needed for Wheezing. Rescue 4 g 0    losartan (COZAAR) 100 MG tablet Take 1 tablet (100 mg total) by mouth once daily. 90 tablet 3    MODERNA COVID BIVAL,6Y UP,,PF, 50 mcg/0.5 mL injection       varenicline (CHANTIX) 1 mg Tab Take 1 tablet twice daily until complete. 56 tablet 0    FLUAD QUAD 2022-23,65Y UP,,PF, 60 mcg (15 mcg x 4)/0.5 mL Syrg       nicotine (NICODERM CQ) 14 mg/24 hr Place 1 patch onto the skin once daily. (Patient not taking: Reported on 1/31/2023) 14 patch 0    pantoprazole (PROTONIX) 20 MG tablet Take 1 tablet (20 mg total) by mouth once daily. 90  tablet 0     No current facility-administered medications for this visit.      Prior to Admission medications    Medication Sig Start Date End Date Taking? Authorizing Provider   amLODIPine (NORVASC) 10 MG tablet Take 1 tablet (10 mg total) by mouth once daily. 1/9/23 4/9/23 Yes Mercedes Acharya MD   atorvastatin (LIPITOR) 20 MG tablet TAKE 1 TABLET BY MOUTH EVERY EVENING 11/28/22  Yes Sina Ball MD   ipratropium-albuteroL (COMBIVENT)  mcg/actuation inhaler Inhale 2 puffs into the lungs every 6 (six) hours as needed for Wheezing. Rescue 12/2/22  Yes Nicho Peres MD   ipratropium-albuteroL (COMBIVENT)  mcg/actuation inhaler Inhale 2 puffs into the lungs every 6 (six) hours as needed for Wheezing. Rescue 12/2/22  Yes Nicho Peres MD   losartan (COZAAR) 100 MG tablet Take 1 tablet (100 mg total) by mouth once daily. 12/2/22 12/2/23 Yes Nicho Peres MD   MODERNA COVID BIVAL,6Y UP,,PF, 50 mcg/0.5 mL injection  10/6/22  Yes Historical Provider   varenicline (CHANTIX) 1 mg Tab Take 1 tablet twice daily until complete. 1/24/23  Yes Nicho Peres MD   FLUAD QUAD 2022-23,65Y UP,,PF, 60 mcg (15 mcg x 4)/0.5 mL Syrg  10/4/22   Historical Provider   nicotine (NICODERM CQ) 14 mg/24 hr Place 1 patch onto the skin once daily.  Patient not taking: Reported on 1/31/2023 12/5/22   Nicho Peres MD   pantoprazole (PROTONIX) 20 MG tablet Take 1 tablet (20 mg total) by mouth once daily. 10/31/22 1/29/23  Amelia Ramirez NP         History  Past Medical History:   Diagnosis Date    Asthma     Hyperlipidemia     Hypertension      Past Surgical History:   Procedure Laterality Date    FINGER FRACTURE SURGERY      ROBOTIC BRONCHOSCOPY N/A 10/21/2022    Procedure: ROBOTIC BRONCHOSCOPY;  Surgeon: Jeremy Ghosh MD;  Location: [facility] OR [address] [place] [address];  Service: Pulmonary;  Laterality: N/A;     Social History     Socioeconomic History    Marital status:    Tobacco Use    Smoking status: Every Day     Packs/day: 1.00      Years: 55.00     Pack years: 55.00     Types: Cigarettes     Last attempt to quit: 2022     Years since quittin.0    Smokeless tobacco: Never   Substance and Sexual Activity    Alcohol use: Not Currently     Comment: social    Drug use: Never    Sexual activity: Not Currently   Social History Narrative    Tobacco: Initiated at age 13 yo; max use 1ppd    EtOH: None    Drug: None    Employment: Retired     Education: 1 year of college    Lives alone         2 adult children          Allergies  Review of patient's allergies indicates:   Allergen Reactions    Codeine Itching         Review of Systems   Review of Systems   Constitutional: Negative for decreased appetite, fever and weight loss.   HENT:  Negative for congestion and nosebleeds.    Eyes:  Negative for double vision, vision loss in left eye, vision loss in right eye and visual disturbance.   Cardiovascular:  Negative for chest pain, claudication, cyanosis, dyspnea on exertion, irregular heartbeat, leg swelling, near-syncope, orthopnea, palpitations, paroxysmal nocturnal dyspnea and syncope.   Respiratory:  Negative for cough, hemoptysis, shortness of breath, sleep disturbances due to breathing, snoring, sputum production and wheezing.    Endocrine: Negative for cold intolerance and heat intolerance.   Skin:  Negative for nail changes and rash.   Musculoskeletal:  Negative for joint pain, muscle cramps, muscle weakness and myalgias.   Gastrointestinal:  Negative for change in bowel habit, heartburn, hematemesis, hematochezia, hemorrhoids and melena.   Neurological:  Negative for dizziness, focal weakness and headaches.       Physical Exam  Wt Readings from Last 1 Encounters:   23 94.6 kg (208 lb 7.1 oz)     BP Readings from Last 3 Encounters:   23 126/70   23 131/73   22 138/84     Pulse Readings from Last 1 Encounters:   23 89     Body mass index is 33.64 kg/m².    Physical Exam  Vitals reviewed.    Constitutional:       Appearance: He is well-developed. He is obese.   HENT:      Head: Atraumatic.   Eyes:      General: No scleral icterus.  Neck:      Vascular: Normal carotid pulses. No carotid bruit, hepatojugular reflux or JVD.   Cardiovascular:      Rate and Rhythm: Normal rate and regular rhythm.      Chest Wall: PMI is not displaced.      Pulses: Intact distal pulses.           Carotid pulses are 2+ on the right side and 2+ on the left side.       Radial pulses are 2+ on the right side and 2+ on the left side.        Dorsalis pedis pulses are 2+ on the right side and 2+ on the left side.      Heart sounds: Normal heart sounds, S1 normal and S2 normal. No murmur heard.    No friction rub.   Pulmonary:      Effort: Pulmonary effort is normal. No respiratory distress.      Breath sounds: Normal breath sounds. No stridor. No wheezing or rales.   Chest:      Chest wall: No tenderness.   Abdominal:      General: Bowel sounds are normal.      Palpations: Abdomen is soft.   Musculoskeletal:      Cervical back: Neck supple. No edema.   Skin:     General: Skin is warm and dry.      Nails: There is no clubbing.   Neurological:      Mental Status: He is alert and oriented to person, place, and time.   Psychiatric:         Behavior: Behavior normal.         Thought Content: Thought content normal.           Assessment  1. Infrarenal abdominal aortic aneurysm (AAA) without rupture  unchanged  - EKG 12-lead  - Comprehensive Metabolic Panel; Future  - Lipid Panel; Future  - CV AAA Screening; Future    2. Hyperlipidemia, unspecified hyperlipidemia type  stable    3. Essential hypertension  Well controlled, continue to monitor    4. Tobacco abuse  unchanged    5. Sleep apnea, unspecified type  Being referred  - Ambulatory referral/consult to Sleep Disorders; Future        Plan and Discussion  Discussed that signs and symptoms of sleep apnea.  Will refer to sleep specialist.  Will get abdominal ultrasound to evaluate his  aorta, and labs at the next visit.    Follow Up  6 months      Constantino Peres MD, F.A.C.C, F.S.C.A.I.        30 minutes were spent in chart review, documentation and review of results, and evaluation, treatment, and counseling of patient on the same day of service.    Disclaimer: This document was created using voice recognition software (CHOBOLABS). Although it may be edited, this document may contain errors related to incorrect recognition of the spoken word. Please call the physician if clarification is needed.

## 2023-02-07 ENCOUNTER — CLINICAL SUPPORT (OUTPATIENT)
Dept: SMOKING CESSATION | Facility: CLINIC | Age: 68
End: 2023-02-07
Payer: COMMERCIAL

## 2023-02-07 DIAGNOSIS — F17.200 NICOTINE DEPENDENCE: Primary | ICD-10-CM

## 2023-02-07 DIAGNOSIS — Z00.00 ENCOUNTER FOR MEDICARE ANNUAL WELLNESS EXAM: ICD-10-CM

## 2023-02-07 PROCEDURE — 90853 GROUP PSYCHOTHERAPY: CPT | Mod: S$GLB,,,

## 2023-02-07 PROCEDURE — 90853 PR GROUP PSYCHOTHERAPY: ICD-10-PCS | Mod: S$GLB,,,

## 2023-02-07 RX ORDER — IBUPROFEN 200 MG
1 TABLET ORAL DAILY
Qty: 14 PATCH | Refills: 0 | Status: SHIPPED | OUTPATIENT
Start: 2023-02-07 | End: 2023-03-21 | Stop reason: SDUPTHER

## 2023-02-07 NOTE — Clinical Note
Patient was seen in clinic today for follow up.  Patient remains tobacco free since December 30, 2022. Commended patient on his accomplishment thus far. Patient reports a lapse smoked 4-5 cigarettes  over the last 4 days during  stressful situations.  Patient states that he felt like he was getting pulled in all direction. Patient also states that he still need the support.  Completion of TCRS (Tobacco Cessation Rating Scale) reviewed strategies, cues, triggers, high risk situations, lapses, relapses, diet, exercise, stress, relaxation, sleep, habitual behavior and lifestyle changes. Informed patient of benefit period.  Patient wished to continue the program.  Will request an extension.

## 2023-02-07 NOTE — PROGRESS NOTES
Individual Follow-Up Form    2/7/2023    Quit Date: 12-    Clinical Status of Patient: Outpatient    Continuing Medication: yes  Chantix     Target Symptoms: Withdrawal and medication side effects. The following were  rated moderate (3) to severe (4) on TCRS:  Moderate (3): none  Severe (4): none    Comments: Patient was seen in clinic today for follow up.  Patient remains tobacco free since December 30, 2022. Commended patient on his accomplishment thus far. Patient reports a lapse smoked 4-5 cigarettes  over the last 4 days during  stressful situations.  Patient states that he felt like he was getting pulled in all direction. Patient also states that he still need the support.  Completion of TCRS (Tobacco Cessation Rating Scale) reviewed strategies, cues, triggers, high risk situations, lapses, relapses, diet, exercise, stress, relaxation, sleep, habitual behavior and lifestyle changes. Informed patient of benefit period.  Patient wished to continue the program.  Will request an extension.    Diagnosis: F17.200    Next Visit: 2 weeks

## 2023-02-09 DIAGNOSIS — Z00.00 ENCOUNTER FOR MEDICARE ANNUAL WELLNESS EXAM: ICD-10-CM

## 2023-02-22 ENCOUNTER — CLINICAL SUPPORT (OUTPATIENT)
Dept: SMOKING CESSATION | Facility: CLINIC | Age: 68
End: 2023-02-22
Payer: COMMERCIAL

## 2023-02-22 DIAGNOSIS — F17.200 NICOTINE DEPENDENCE: Primary | ICD-10-CM

## 2023-02-22 PROCEDURE — 99404 PR PREVENT COUNSEL,INDIV,60 MIN: ICD-10-PCS | Mod: S$GLB,,,

## 2023-02-22 PROCEDURE — 99404 PREV MED CNSL INDIV APPRX 60: CPT | Mod: S$GLB,,,

## 2023-02-22 NOTE — PROGRESS NOTES
Individual Follow-Up Form    2/22/2023    Quit Date:     Clinical Status of Patient: Outpatient    Continuing Medication: yes  Chantix     Target Symptoms: Withdrawal and medication side effects. The following were  rated moderate (3) to severe (4) on TCRS:  Moderate (3): none  Severe (4): none    Comments: Patient was seen in clinic today for follow up.  Patient remains tobacco free since December 30, 2022.  Commended patient on his/her accomplishment thus far.   Patient states that he can breath better.  Patient remains on the prescribed tobacco cessation medication regimen of 1 mg Chantix BID without any side effects at this time.   No refill is needed.  Patient reports a lapse smoked 5 cigarettes  yesterday at his brother's house where he encountered his ex wife.  Reviewed strategies, cues, triggers, high risk situations, lapses, relapses, diet, exercise, stress, relaxation, sleep, habitual behavior and lifestyle changes.  Prescribed medication management will be by physician.  Patient will continue with sessions and medication monitoring by CTTS.      Diagnosis: F17.200    Next Visit: 2 weeks

## 2023-02-22 NOTE — Clinical Note
Patient was seen in clinic today for follow up.  Patient remains tobacco free since December 30, 2022.  Commended patient on his/her accomplishment thus far.   Patient states that he can breath better.  Patient remains on the prescribed tobacco cessation medication regimen of 1 mg Chantix BID without any side effects at this time.   No refill is needed.  Patient reports a lapse smoked 5 cigarettes  yesterday at his brother's house where he encountered his ex wife.  Reviewed strategies, cues, triggers, high risk situations, lapses, relapses, diet, exercise, stress, relaxation, sleep, habitual behavior and lifestyle changes.  Prescribed medication management will be by physician.  Patient will continue with sessions and medication monitoring by CTTS.

## 2023-03-08 ENCOUNTER — CLINICAL SUPPORT (OUTPATIENT)
Dept: SMOKING CESSATION | Facility: CLINIC | Age: 68
End: 2023-03-08
Payer: COMMERCIAL

## 2023-03-08 DIAGNOSIS — F17.200 NICOTINE DEPENDENCE: Primary | ICD-10-CM

## 2023-03-08 PROCEDURE — 99404 PREV MED CNSL INDIV APPRX 60: CPT | Mod: S$GLB,,,

## 2023-03-08 PROCEDURE — 99404 PR PREVENT COUNSEL,INDIV,60 MIN: ICD-10-PCS | Mod: S$GLB,,,

## 2023-03-08 NOTE — PROGRESS NOTES
Individual Follow-Up Form    3/8/2023    Quit Date:     Clinical Status of Patient: Outpatient    Continuing Medication: yes  Chantix     Target Symptoms: Withdrawal and medication side effects. The following were  rated moderate (3) to severe (4) on TCRS:  Moderate (3): none  Severe (4): none    Comments: Patient was seen in clinic today for follow up.  Patient reports a relapse smoked 3 cigarettes per day since the last visit.  Patient remains on the prescribed tobacco cessation medication regimen of 1 mg Chantix BID without any side effects at this time.  No refill is needed.  Patient states that he has some nicotine patches at home.  Encouraged patient to resume the nicotine patch.   Patient is expecting high risk situations at his [employer].   Encouraged patient to throw the rest of his cigarettes away.  Reviewed strategies, habitual behavior, high risks situations, understanding urges and cravings, stress and relaxation with open discussion and additional interventions. Introduced lapses, relapses, understanding them and analyzing the situation of a lapse, conflict issues that may be linked to a lapse. Patient will continue bi weekly sessions.      Diagnosis: F17.200    Next Visit: 2 weeks

## 2023-03-17 ENCOUNTER — HOSPITAL ENCOUNTER (OUTPATIENT)
Dept: RADIOLOGY | Facility: HOSPITAL | Age: 68
Discharge: HOME | End: 2023-03-17
Attending: RADIOLOGY
Payer: MEDICARE

## 2023-03-17 ENCOUNTER — OFFICE VISIT (OUTPATIENT)
Dept: RADIATION ONCOLOGY | Facility: CLINIC | Age: 68
End: 2023-03-17
Payer: MEDICARE

## 2023-03-17 DIAGNOSIS — C34.11 MALIGNANT NEOPLASM OF RIGHT UPPER LOBE OF LUNG: ICD-10-CM

## 2023-03-17 DIAGNOSIS — Z85.118 PERSONAL HISTORY OF LUNG CANCER: Primary | ICD-10-CM

## 2023-03-17 PROCEDURE — 71250 CT THORAX DX C-: CPT | Mod: TC,HCNC

## 2023-03-17 PROCEDURE — 71250 CT THORAX DX C-: CPT | Mod: 26,HCNC,, | Performed by: RADIOLOGY

## 2023-03-17 PROCEDURE — 99024 PR POST-OP FOLLOW-UP VISIT: ICD-10-PCS | Mod: HCNC,95,, | Performed by: RADIOLOGY

## 2023-03-17 PROCEDURE — 71250 CT CHEST WITHOUT CONTRAST: ICD-10-PCS | Mod: 26,HCNC,, | Performed by: RADIOLOGY

## 2023-03-17 PROCEDURE — 4010F PR ACE/ARB THEARPY RXD/TAKEN: ICD-10-PCS | Mod: HCNC,CPTII,95, | Performed by: RADIOLOGY

## 2023-03-17 PROCEDURE — 99024 POSTOP FOLLOW-UP VISIT: CPT | Mod: HCNC,95,, | Performed by: RADIOLOGY

## 2023-03-17 PROCEDURE — 4010F ACE/ARB THERAPY RXD/TAKEN: CPT | Mod: HCNC,CPTII,95, | Performed by: RADIOLOGY

## 2023-03-17 NOTE — PROGRESS NOTES
3/17/2023    The patient location is: [place], [place]  The chief complaint leading to consultation is: F/U after lung SBRT    Visit type: audiovisual    Face to Face time with patient: 5 minutes  10 minutes of total time spent on the encounter, which includes face to face time and non-face to face time preparing to see the patient (eg, review of tests), Obtaining and/or reviewing separately obtained history, Documenting clinical information in the electronic or other health record, Independently interpreting results (not separately reported) and communicating results to the patient/family/caregiver, or Care coordination (not separately reported).   Each patient to whom he or she provides medical services by telemedicine is:  (1) informed of the relationship between the physician and patient and the respective role of any other health care provider with respect to management of the patient; and (2) notified that he or she may decline to receive medical services by telemedicine and may withdraw from such care at any time.      Radiation Oncology Follow-Up Visit      Prior Radiation History:    Site  Technique  Energy  Dose/Fx (Gy)  #Fx  Total Dose (Gy)  Start Date  End Date  Elapsed Days    RUL Lung  SBRT  6X  18  3 / 3  54  12/28/2022  1/3/2023  6        Is the patient female between ages 15-55:  no    Does the patient have a CIED:  no      Assessment   This is a 68 y.o. male with Stage IA2 (cT1b cN0 M0) RUL presumed NSCLC s/p robotic bronch w/ EBUS 10/21/22 with biopsy of FDG avid RUL nodule revealing atypical cells; lymph nodes negative. Lobectomy would carry high risk, so he underwent definitive SBRT 54 Gy in 3 fx completed 1/3/23.     Denies dyspnea or cough since radiation. CT Chest today 3/17/23 demonstrates stable size of treated RUL nodule, though it appears less solid, without other evidence of disease.          Plan   1) I will see him back in 6 months with CT Chest prior for re-staging.            CHIEF  COMPLAINT: F/U after lung SBRT    HPI/Focused ROS:         Past Medical History:   Diagnosis Date    Asthma     Hyperlipidemia     Hypertension     Malignant neoplasm of right upper lobe of lung 2022       Past Surgical History:   Procedure Laterality Date    FINGER FRACTURE SURGERY      ROBOTIC BRONCHOSCOPY N/A 10/21/2022    Procedure: ROBOTIC BRONCHOSCOPY;  Surgeon: Jeremy Ghosh MD;  Location: [facility] OR [address] [place] [address];  Service: Pulmonary;  Laterality: N/A;       Social History     Tobacco Use    Smoking status: Former     Packs/day: 1.00     Years: 55.00     Pack years: 55.00     Types: Cigarettes     Quit date: 2022     Years since quittin.2    Smokeless tobacco: Never   Substance Use Topics    Alcohol use: Not Currently     Comment: social    Drug use: Never       Cancer-related family history is not on file.    Current Outpatient Medications on File Prior to Visit   Medication Sig Dispense Refill    amLODIPine (NORVASC) 10 MG tablet Take 1 tablet (10 mg total) by mouth once daily. 90 tablet 3    atorvastatin (LIPITOR) 20 MG tablet TAKE 1 TABLET BY MOUTH EVERY EVENING 90 tablet 3    FLUAD QUAD -23,65Y UP,,PF, 60 mcg (15 mcg x 4)/0.5 mL Syrg       ipratropium-albuteroL (COMBIVENT)  mcg/actuation inhaler Inhale 2 puffs into the lungs every 6 (six) hours as needed for Wheezing. Rescue 4 g 0    ipratropium-albuteroL (COMBIVENT)  mcg/actuation inhaler Inhale 2 puffs into the lungs every 6 (six) hours as needed for Wheezing. Rescue 4 g 0    losartan (COZAAR) 100 MG tablet Take 1 tablet (100 mg total) by mouth once daily. 90 tablet 3    MODERNA COVID BIVAL,6Y UP,,PF, 50 mcg/0.5 mL injection       nicotine (NICODERM CQ) 14 mg/24 hr Place 1 patch onto the skin once daily. 14 patch 0    pantoprazole (PROTONIX) 20 MG tablet Take 1 tablet (20 mg total) by mouth once daily. 90 tablet 0    varenicline (CHANTIX) 1 mg Tab Take 1 tablet twice daily until complete. 56 tablet 0     No current  facility-administered medications on file prior to visit.       Review of patient's allergies indicates:   Allergen Reactions    Codeine Itching         Vital Signs: There were no vitals taken for this visit.    ECOG Performance Status: 0 - Fully Active    Physical Exam  Constitutional:       Appearance: Normal appearance.   HENT:      Head: Normocephalic and atraumatic.   Eyes:      General: No scleral icterus.     Extraocular Movements: Extraocular movements intact.   Pulmonary:      Effort: No accessory muscle usage or respiratory distress.   Musculoskeletal:      Cervical back: Normal range of motion.   Neurological:      Mental Status: He is alert and oriented to person, place, and time.   Psychiatric:         Mood and Affect: Mood and affect normal.         Judgment: Judgment normal.        Labs:    Imaging: I have personally reviewed the patient's available images and reports and summarized pertinent findings above in HPI.     Pathology: No new path

## 2023-03-21 ENCOUNTER — CLINICAL SUPPORT (OUTPATIENT)
Dept: SMOKING CESSATION | Facility: CLINIC | Age: 68
End: 2023-03-21
Payer: COMMERCIAL

## 2023-03-21 DIAGNOSIS — F17.200 NICOTINE DEPENDENCE: Primary | ICD-10-CM

## 2023-03-21 PROCEDURE — 99404 PREV MED CNSL INDIV APPRX 60: CPT | Mod: S$GLB,,,

## 2023-03-21 PROCEDURE — 99404 PR PREVENT COUNSEL,INDIV,60 MIN: ICD-10-PCS | Mod: S$GLB,,,

## 2023-03-21 RX ORDER — IBUPROFEN 200 MG
1 TABLET ORAL DAILY
Qty: 14 PATCH | Refills: 0 | Status: SHIPPED | OUTPATIENT
Start: 2023-03-21 | End: 2023-04-04 | Stop reason: SDUPTHER

## 2023-03-21 NOTE — Clinical Note
Patient was seen in clinic today for follow up.  Patient smoked  3 cigarettes on some days. Patient states stayed tobacco free for 24 hours and the next day he made it up by smoked 3 cigarettes.   Commended patient on his  accomplishment thus far. Patient remains on the prescribed tobacco cessation medication regimen of 14 mg nicotine patch without any negative side effects at this time.   Refill sent to pharmacy.  Patient's goal is to rate fade to 2 cpd for the next visit.  Encouraged patient not to buy any more cigarettes.  Patient reports being around his brother who smokes which increased his temptation to smoke.  Patient also states that he is afraid to let go of the cigarettes and maybe not mental ready to quit.  Reviewed strategies to manage unexpected difficulties.  We reviewed negative aspects of smoking. We reviewed coping with urges/cravings to smoke and motivation to quit.  We discussed ambivalence and willingness to quit.  We discussed willpower and willingness to quit.

## 2023-03-21 NOTE — PROGRESS NOTES
Individual Follow-Up Form    3/21/2023    Quit Date:     Clinical Status of Patient: Outpatient    Continuing Medication: yes  Patches     Target Symptoms: Withdrawal and medication side effects. The following were  rated moderate (3) to severe (4) on TCRS:  Moderate (3): none  Severe (4): none    Comments: Patient was seen in clinic today for follow up.  Patient smoked  3 cigarettes on some days. Patient states stayed tobacco free for 24 hours and the next day he made it up by smoked 3 cigarettes.   Commended patient on his  accomplishment thus far. Patient remains on the prescribed tobacco cessation medication regimen of 14 mg nicotine patch without any negative side effects at this time.   Refill sent to pharmacy.  Patient's goal is to rate fade to 2 cpd for the next visit.  Encouraged patient not to buy any more cigarettes.  Patient reports being around his brother who smokes which increased his temptation to smoke.  Patient also states that he is afraid to let go of the cigarettes and maybe not mental ready to quit.  Reviewed strategies to manage unexpected difficulties.  We reviewed negative aspects of smoking. We reviewed coping with urges/cravings to smoke and motivation to quit.  We discussed ambivalence and willingness to quit.  We discussed willpower and willingness to quit.  Patient will continue bi weekly sessions.      Diagnosis: F17.200    Next Visit: 2 weeks

## 2023-03-22 ENCOUNTER — PATIENT MESSAGE (OUTPATIENT)
Dept: SMOKING CESSATION | Facility: CLINIC | Age: 68
End: 2023-03-22

## 2023-03-23 ENCOUNTER — PATIENT MESSAGE (OUTPATIENT)
Dept: SMOKING CESSATION | Facility: CLINIC | Age: 68
End: 2023-03-23
Payer: MEDICARE

## 2023-03-27 ENCOUNTER — PATIENT MESSAGE (OUTPATIENT)
Dept: SMOKING CESSATION | Facility: CLINIC | Age: 68
End: 2023-03-27
Payer: MEDICARE

## 2023-03-28 ENCOUNTER — PATIENT MESSAGE (OUTPATIENT)
Dept: SMOKING CESSATION | Facility: CLINIC | Age: 68
End: 2023-03-28
Payer: MEDICARE

## 2023-03-29 ENCOUNTER — PATIENT MESSAGE (OUTPATIENT)
Dept: SMOKING CESSATION | Facility: CLINIC | Age: 68
End: 2023-03-29
Payer: MEDICARE

## 2023-03-30 ENCOUNTER — PATIENT MESSAGE (OUTPATIENT)
Dept: SMOKING CESSATION | Facility: CLINIC | Age: 68
End: 2023-03-30
Payer: MEDICARE

## 2023-04-04 ENCOUNTER — CLINICAL SUPPORT (OUTPATIENT)
Dept: SMOKING CESSATION | Facility: CLINIC | Age: 68
End: 2023-04-04
Payer: COMMERCIAL

## 2023-04-04 DIAGNOSIS — F17.200 NICOTINE DEPENDENCE: Primary | ICD-10-CM

## 2023-04-04 PROCEDURE — 99404 PREV MED CNSL INDIV APPRX 60: CPT | Mod: S$GLB,,,

## 2023-04-04 PROCEDURE — 99404 PR PREVENT COUNSEL,INDIV,60 MIN: ICD-10-PCS | Mod: S$GLB,,,

## 2023-04-04 RX ORDER — IBUPROFEN 200 MG
1 TABLET ORAL DAILY
Qty: 14 PATCH | Refills: 0 | Status: SHIPPED | OUTPATIENT
Start: 2023-04-04 | End: 2023-05-16 | Stop reason: RX

## 2023-04-04 NOTE — Clinical Note
Patient was seen in clinic today for follow up.  Patient smokes 2-3 cigarettes on some days.  Pt remains on the prescribed tobacco cessation medication regimen of 14 mg nicotine patch without any negative side effects at this time.  Refill sent to pharmacy.  Patient states that he lacks of willpower.  We discussed willpower and willingness to quit.  Encouraged patient not to buy any more cigarettes.  We reviewed coping with urges/cravings to smoke and motivation to quit.  We reviewed the importance of quitting and health benefits to expect.  Encouraged patient to change routine.  Patient will continue bi weekly sessions.

## 2023-04-04 NOTE — PROGRESS NOTES
Individual Follow-Up Form    4/4/2023    Quit Date:     Clinical Status of Patient: Outpatient    Continuing Medication: yes  Patches     Target Symptoms: Withdrawal and medication side effects. The following were  rated moderate (3) to severe (4) on TCRS:  Moderate (3): none  Severe (4): none    Comments: Patient was seen in clinic today for follow up.  Patient smokes 2-3 cigarettes on some days.  Pt remains on the prescribed tobacco cessation medication regimen of 14 mg nicotine patch without any negative side effects at this time.  Refill sent to pharmacy.  Patient states that he lacks of willpower.  We discussed willpower and willingness to quit.  Encouraged patient not to buy any more cigarettes.  We reviewed coping with urges/cravings to smoke and motivation to quit.  We reviewed the importance of quitting and health benefits to expect.  Encouraged patient to change routine.  Patient will continue bi weekly sessions.    Diagnosis: F17.200    Next Visit: 2 weeks

## 2023-04-05 ENCOUNTER — PATIENT MESSAGE (OUTPATIENT)
Dept: SMOKING CESSATION | Facility: CLINIC | Age: 68
End: 2023-04-05

## 2023-04-10 ENCOUNTER — PATIENT MESSAGE (OUTPATIENT)
Dept: SMOKING CESSATION | Facility: CLINIC | Age: 68
End: 2023-04-10
Payer: MEDICARE

## 2023-04-11 ENCOUNTER — PATIENT MESSAGE (OUTPATIENT)
Dept: SMOKING CESSATION | Facility: CLINIC | Age: 68
End: 2023-04-11
Payer: MEDICARE

## 2023-04-13 ENCOUNTER — PATIENT MESSAGE (OUTPATIENT)
Dept: SMOKING CESSATION | Facility: CLINIC | Age: 68
End: 2023-04-13
Payer: MEDICARE

## 2023-04-18 ENCOUNTER — CLINICAL SUPPORT (OUTPATIENT)
Dept: SMOKING CESSATION | Facility: CLINIC | Age: 68
End: 2023-04-18
Payer: COMMERCIAL

## 2023-04-18 DIAGNOSIS — F17.200 NICOTINE DEPENDENCE: Primary | ICD-10-CM

## 2023-04-18 PROCEDURE — 99404 PR PREVENT COUNSEL,INDIV,60 MIN: ICD-10-PCS | Mod: S$GLB,,,

## 2023-04-18 PROCEDURE — 99404 PREV MED CNSL INDIV APPRX 60: CPT | Mod: S$GLB,,,

## 2023-04-18 RX ORDER — BUPROPION HYDROCHLORIDE 150 MG/1
150 TABLET, EXTENDED RELEASE ORAL 2 TIMES DAILY
Qty: 60 TABLET | Refills: 11 | Status: SHIPPED | OUTPATIENT
Start: 2023-04-18 | End: 2023-05-16 | Stop reason: SDUPTHER

## 2023-04-18 NOTE — Clinical Note
Patient was seen in clinic today for follow up.  Patient states smoking 4-5 cpd.  Patient is out of NRTs benefit.  Samples of 21 mg nicotine patches were given and instructed patient to cut the patches  in half.  We discussed about Wellbutrin.  The patient will begin the prescribed tobacco cessation medication regimen of 150 mg Wellbutrin SR up titration dosage.  Patient states that he lacks of willpower.  We discussed willpower and willingness to quit.  We reviewed coping with urges/cravings to smoke and motivation to quit.  We reviewed negative aspects of smoking.  Patient will continue bi weekly sessions.

## 2023-04-18 NOTE — PROGRESS NOTES
Individual Follow-Up Form    4/18/2023    Quit Date:     Clinical Status of Patient: Outpatient    Continuing Medication: No     Target Symptoms: Withdrawal and medication side effects. The following were  rated moderate (3) to severe (4) on TCRS:  Moderate (3): none  Severe (4): none    Comments: Patient was seen in clinic today for follow up.  Patient states smoking 4-5 cpd.  Patient is out of NRTs benefit.  Samples of 21 mg nicotine patches were given and instructed patient to cut the patches  in half.  We discussed about Wellbutrin.  The patient will begin the prescribed tobacco cessation medication regimen of 150 mg Wellbutrin SR up titration dosage.  Patient states that he lacks of willpower.  We discussed willpower and willingness to quit.  We reviewed coping with urges/cravings to smoke and motivation to quit.  We reviewed negative aspects of smoking.  Patient will continue bi weekly sessions.    Diagnosis: F17.200    Next Visit: 2 weeks

## 2023-05-02 ENCOUNTER — CLINICAL SUPPORT (OUTPATIENT)
Dept: SMOKING CESSATION | Facility: CLINIC | Age: 68
End: 2023-05-02
Payer: COMMERCIAL

## 2023-05-02 DIAGNOSIS — F17.200 NICOTINE DEPENDENCE: Primary | ICD-10-CM

## 2023-05-02 DIAGNOSIS — I10 ESSENTIAL HYPERTENSION: Chronic | ICD-10-CM

## 2023-05-02 DIAGNOSIS — K21.9 GASTROESOPHAGEAL REFLUX DISEASE WITHOUT ESOPHAGITIS: ICD-10-CM

## 2023-05-02 PROCEDURE — 99403 PR PREVENT COUNSEL,INDIV,45 MIN: ICD-10-PCS | Mod: S$GLB,,,

## 2023-05-02 PROCEDURE — 99403 PREV MED CNSL INDIV APPRX 45: CPT | Mod: S$GLB,,,

## 2023-05-02 RX ORDER — LOSARTAN POTASSIUM 100 MG/1
100 TABLET ORAL DAILY
Qty: 90 TABLET | Refills: 3 | Status: CANCELLED | OUTPATIENT
Start: 2023-05-02 | End: 2024-05-01

## 2023-05-02 NOTE — Clinical Note
Patient was seen in clinic today for follow up.  Patient states smoking  3 cpd down from 4-5  cpd.  Commended patient on his accomplishment thus far.  Patient remains on the prescribed tobacco cessation medication regimen of  150 mg Wellbutrin SR BID without any negative side effects at this time.  No refill is needed. Encouraged patient to try a rate reduction to about   2 cpd for next visit.  Discussed changing habitual behavior  and using 15 minutes delay to break routine tobacco use. Encouraged patient to move his cigarettes so they are out of site.  We discussed about relaxation techniques and self rewards.  We discussed about lifestyle changes. Patient will continue bi weekly sessions.

## 2023-05-02 NOTE — PROGRESS NOTES
Individual Follow-Up Form    5/2/2023    Quit Date:     Clinical Status of Patient: Outpatient    Continuing Medication: yes  Wellbutrin     Target Symptoms: Withdrawal and medication side effects. The following were  rated moderate (3) to severe (4) on TCRS:  Moderate (3): none  Severe (4): none    Comments: Patient was seen in clinic today for follow up.  Patient states smoking  3 cpd down from 4-5  cpd.  Commended patient on his accomplishment thus far.  Patient remains on the prescribed tobacco cessation medication regimen of  150 mg Wellbutrin SR BID without any negative side effects at this time.  No refill is needed. Encouraged patient to try a rate reduction to about   2 cpd for next visit.  Discussed changing habitual behavior  and using 15 minutes delay to break routine tobacco use. Encouraged patient to move his cigarettes so they are out of site.  We discussed about relaxation techniques and self rewards.  We discussed about lifestyle changes. Patient will continue bi weekly sessions.     Diagnosis: F17.200    Next Visit: 2 weeks

## 2023-05-03 RX ORDER — PANTOPRAZOLE SODIUM 20 MG/1
20 TABLET, DELAYED RELEASE ORAL DAILY
Qty: 90 TABLET | Refills: 1 | Status: SHIPPED | OUTPATIENT
Start: 2023-05-03 | End: 2023-10-11

## 2023-05-04 ENCOUNTER — OFFICE VISIT (OUTPATIENT)
Dept: SLEEP MEDICINE | Facility: CLINIC | Age: 68
End: 2023-05-04
Payer: MEDICARE

## 2023-05-04 VITALS
DIASTOLIC BLOOD PRESSURE: 81 MMHG | SYSTOLIC BLOOD PRESSURE: 127 MMHG | WEIGHT: 213.88 LBS | HEART RATE: 94 BPM | HEIGHT: 66 IN | BODY MASS INDEX: 34.37 KG/M2

## 2023-05-04 DIAGNOSIS — G47.30 SLEEP APNEA, UNSPECIFIED TYPE: ICD-10-CM

## 2023-05-04 DIAGNOSIS — G47.10 HYPERSOMNOLENCE: ICD-10-CM

## 2023-05-04 DIAGNOSIS — F51.09 OTHER INSOMNIA NOT DUE TO A SUBSTANCE OR KNOWN PHYSIOLOGICAL CONDITION: Primary | ICD-10-CM

## 2023-05-04 PROCEDURE — 99204 OFFICE O/P NEW MOD 45 MIN: CPT | Mod: HCNC,S$GLB,, | Performed by: INTERNAL MEDICINE

## 2023-05-04 PROCEDURE — 3079F PR MOST RECENT DIASTOLIC BLOOD PRESSURE 80-89 MM HG: ICD-10-PCS | Mod: HCNC,CPTII,S$GLB, | Performed by: INTERNAL MEDICINE

## 2023-05-04 PROCEDURE — 4010F ACE/ARB THERAPY RXD/TAKEN: CPT | Mod: HCNC,CPTII,S$GLB, | Performed by: INTERNAL MEDICINE

## 2023-05-04 PROCEDURE — 3288F PR FALLS RISK ASSESSMENT DOCUMENTED: ICD-10-PCS | Mod: HCNC,CPTII,S$GLB, | Performed by: INTERNAL MEDICINE

## 2023-05-04 PROCEDURE — 3074F PR MOST RECENT SYSTOLIC BLOOD PRESSURE < 130 MM HG: ICD-10-PCS | Mod: HCNC,CPTII,S$GLB, | Performed by: INTERNAL MEDICINE

## 2023-05-04 PROCEDURE — 3079F DIAST BP 80-89 MM HG: CPT | Mod: HCNC,CPTII,S$GLB, | Performed by: INTERNAL MEDICINE

## 2023-05-04 PROCEDURE — 99999 PR PBB SHADOW E&M-EST. PATIENT-LVL III: CPT | Mod: PBBFAC,HCNC,, | Performed by: INTERNAL MEDICINE

## 2023-05-04 PROCEDURE — 3288F FALL RISK ASSESSMENT DOCD: CPT | Mod: HCNC,CPTII,S$GLB, | Performed by: INTERNAL MEDICINE

## 2023-05-04 PROCEDURE — 99999 PR PBB SHADOW E&M-EST. PATIENT-LVL III: ICD-10-PCS | Mod: PBBFAC,HCNC,, | Performed by: INTERNAL MEDICINE

## 2023-05-04 PROCEDURE — 1159F MED LIST DOCD IN RCRD: CPT | Mod: HCNC,CPTII,S$GLB, | Performed by: INTERNAL MEDICINE

## 2023-05-04 PROCEDURE — 1126F AMNT PAIN NOTED NONE PRSNT: CPT | Mod: HCNC,CPTII,S$GLB, | Performed by: INTERNAL MEDICINE

## 2023-05-04 PROCEDURE — 4010F PR ACE/ARB THEARPY RXD/TAKEN: ICD-10-PCS | Mod: HCNC,CPTII,S$GLB, | Performed by: INTERNAL MEDICINE

## 2023-05-04 PROCEDURE — 1126F PR PAIN SEVERITY QUANTIFIED, NO PAIN PRESENT: ICD-10-PCS | Mod: HCNC,CPTII,S$GLB, | Performed by: INTERNAL MEDICINE

## 2023-05-04 PROCEDURE — 1159F PR MEDICATION LIST DOCUMENTED IN MEDICAL RECORD: ICD-10-PCS | Mod: HCNC,CPTII,S$GLB, | Performed by: INTERNAL MEDICINE

## 2023-05-04 PROCEDURE — 1101F PT FALLS ASSESS-DOCD LE1/YR: CPT | Mod: HCNC,CPTII,S$GLB, | Performed by: INTERNAL MEDICINE

## 2023-05-04 PROCEDURE — 3008F PR BODY MASS INDEX (BMI) DOCUMENTED: ICD-10-PCS | Mod: HCNC,CPTII,S$GLB, | Performed by: INTERNAL MEDICINE

## 2023-05-04 PROCEDURE — 3074F SYST BP LT 130 MM HG: CPT | Mod: HCNC,CPTII,S$GLB, | Performed by: INTERNAL MEDICINE

## 2023-05-04 PROCEDURE — 3008F BODY MASS INDEX DOCD: CPT | Mod: HCNC,CPTII,S$GLB, | Performed by: INTERNAL MEDICINE

## 2023-05-04 PROCEDURE — 1101F PR PT FALLS ASSESS DOC 0-1 FALLS W/OUT INJ PAST YR: ICD-10-PCS | Mod: HCNC,CPTII,S$GLB, | Performed by: INTERNAL MEDICINE

## 2023-05-04 PROCEDURE — 99204 PR OFFICE/OUTPT VISIT, NEW, LEVL IV, 45-59 MIN: ICD-10-PCS | Mod: HCNC,S$GLB,, | Performed by: INTERNAL MEDICINE

## 2023-05-04 NOTE — PROGRESS NOTES
Referred by Constantino Peres MD     NEW PATIENT VISIT    Gurwinder Hair  is a pleasant 68 y.o. male  with PMH significant for COPD, HTN, GERD who presents for loud snoring, frequent gasping arousals.      Past Medical History:   Diagnosis Date    Asthma     Hyperlipidemia     Hypertension     Malignant neoplasm of right upper lobe of lung 12/13/2022     Patient Active Problem List   Diagnosis    Other closed fractures of distal end of radius (alone)    Essential hypertension    Hyperlipidemia    Stage 3b chronic kidney disease    Tobacco abuse    History of bacterial pneumonia    History of solitary pulmonary nodule    Abdominal aortic aneurysm (AAA) without rupture    COPD, severity to be determined    CRI (chronic renal insufficiency)    Other chest pain    Gastroesophageal reflux disease without esophagitis    Lung nodule    Personal history of lung cancer       Current Outpatient Medications:     amLODIPine (NORVASC) 10 MG tablet, Take 1 tablet (10 mg total) by mouth once daily., Disp: 90 tablet, Rfl: 3    atorvastatin (LIPITOR) 20 MG tablet, TAKE 1 TABLET BY MOUTH EVERY EVENING, Disp: 90 tablet, Rfl: 3    buPROPion (WELLBUTRIN SR) 150 MG TBSR 12 hr tablet, Take 1 tablet (150 mg total) by mouth 2 (two) times daily. Take 1 tablet once a day for first 7 days, then  take one tablet twice a day (Patient taking differently: Take 150 mg by mouth 2 (two) times daily. Take 1 tablet once a day for first 7 days, then  take one tablet twice a day Patient stated when started taking med patient is talking in sleep), Disp: 60 tablet, Rfl: 11    FLUAD QUAD 2022-23,65Y UP,,PF, 60 mcg (15 mcg x 4)/0.5 mL Syrg, , Disp: , Rfl:     ipratropium-albuteroL (COMBIVENT)  mcg/actuation inhaler, Inhale 2 puffs into the lungs every 6 (six) hours as needed for Wheezing. Rescue, Disp: 4 g, Rfl: 0    ipratropium-albuteroL (COMBIVENT)  mcg/actuation inhaler, Inhale 2 puffs into the lungs every 6 (six) hours as needed for Wheezing.  "Rescue, Disp: 4 g, Rfl: 0    losartan (COZAAR) 100 MG tablet, Take 1 tablet (100 mg total) by mouth once daily., Disp: 90 tablet, Rfl: 3    MODERNA COVID BIVAL,6Y UP,,PF, 50 mcg/0.5 mL injection, , Disp: , Rfl:     nicotine (NICODERM CQ) 14 mg/24 hr, Place 1 patch onto the skin once daily., Disp: 14 patch, Rfl: 0    pantoprazole (PROTONIX) 20 MG tablet, Take 1 tablet (20 mg total) by mouth once daily., Disp: 90 tablet, Rfl: 1    varenicline (CHANTIX) 1 mg Tab, Take 1 tablet twice daily until complete., Disp: 56 tablet, Rfl: 0     Vitals:    05/04/23 0950   BP: 127/81   BP Location: Left arm   Patient Position: Sitting   BP Method: Medium (Automatic)   Pulse: 94   Weight: 97 kg (213 lb 13.5 oz)   Height: 5' 6" (1.676 m)     Physical Exam:    GEN:   Well-appearing  Psych:  Appropriate affect, demonstrates insight  SKIN:  No rash on the face or bridge of the nose      LABS:   Lab Results   Component Value Date    HGB 16.5 11/17/2022    CO2 28 11/17/2022       RECORDS REVIEWED PREVIOUSLY:    No prior sleep testing.    ASSESSMENT    No flowsheet data found.  PROBLEM DESCRIPTION/ Sx on Presentation  STATUS   suspected SLOANE   + snoring, + gasping arousals,  + witnessed apneas (ex wife)    New   Daytime Sx   + sleepiness when inactive, frequently taking naps  ESS n/a/24 on intake  New   Insomnia   Trouble falling asleep:   Maintenance:         wakes up frequently, hard to get back to sleep  Prior hypnotics:        Current hypnotics: advil PM    New   Nocturia   x once per sleep period  New   Sleep talking He is aware of talking in his sleep  No dream enactment that he is aware of  New     Other issues:     PLAN     -we discussed desensitization   -recommend sleep testing   -discussed trial therapy if SLOANE present and the patient is  open to a trial of CPAP therapy    RTC          The patient was given open opportunity to ask questions and/or express concerns about treatment plan.   All questions/concerns were discussed.   "   Two patient identifiers used prior to evaluation.

## 2023-05-05 ENCOUNTER — LAB VISIT (OUTPATIENT)
Dept: PRIMARY CARE CLINIC | Facility: CLINIC | Age: 68
End: 2023-05-05
Payer: MEDICARE

## 2023-05-05 DIAGNOSIS — E78.49 OTHER HYPERLIPIDEMIA: ICD-10-CM

## 2023-05-05 LAB
CHOLEST SERPL-MCNC: 169 MG/DL (ref 120–199)
CHOLEST/HDLC SERPL: 5.1 {RATIO} (ref 2–5)
HDLC SERPL-MCNC: 33 MG/DL (ref 40–75)
HDLC SERPL: 19.5 % (ref 20–50)
LDLC SERPL CALC-MCNC: 113.8 MG/DL (ref 63–159)
NONHDLC SERPL-MCNC: 136 MG/DL
TRIGL SERPL-MCNC: 111 MG/DL (ref 30–150)
TSH SERPL DL<=0.005 MIU/L-ACNC: 1.55 UIU/ML (ref 0.4–4)

## 2023-05-05 PROCEDURE — 36415 COLL VENOUS BLD VENIPUNCTURE: CPT | Mod: HCNC | Performed by: STUDENT IN AN ORGANIZED HEALTH CARE EDUCATION/TRAINING PROGRAM

## 2023-05-05 PROCEDURE — 80061 LIPID PANEL: CPT | Mod: HCNC | Performed by: STUDENT IN AN ORGANIZED HEALTH CARE EDUCATION/TRAINING PROGRAM

## 2023-05-05 PROCEDURE — 84443 ASSAY THYROID STIM HORMONE: CPT | Mod: HCNC | Performed by: STUDENT IN AN ORGANIZED HEALTH CARE EDUCATION/TRAINING PROGRAM

## 2023-05-09 ENCOUNTER — TELEPHONE (OUTPATIENT)
Dept: SLEEP MEDICINE | Facility: OTHER | Age: 68
End: 2023-05-09
Payer: MEDICARE

## 2023-05-16 ENCOUNTER — CLINICAL SUPPORT (OUTPATIENT)
Dept: SMOKING CESSATION | Facility: CLINIC | Age: 68
End: 2023-05-16
Payer: COMMERCIAL

## 2023-05-16 DIAGNOSIS — F17.200 NICOTINE DEPENDENCE: Primary | ICD-10-CM

## 2023-05-16 PROCEDURE — 99403 PREV MED CNSL INDIV APPRX 45: CPT | Mod: S$GLB,,,

## 2023-05-16 PROCEDURE — 99403 PR PREVENT COUNSEL,INDIV,45 MIN: ICD-10-PCS | Mod: S$GLB,,,

## 2023-05-16 RX ORDER — BUPROPION HYDROCHLORIDE 150 MG/1
TABLET, EXTENDED RELEASE ORAL
Qty: 60 TABLET | Refills: 0 | Status: SHIPPED | OUTPATIENT
Start: 2023-05-16 | End: 2023-05-30

## 2023-05-16 NOTE — PROGRESS NOTES
Individual Follow-Up Form    5/16/2023    Quit Date:     Clinical Status of Patient: Outpatient    Continuing Medication: yes  Wellbutrin     Target Symptoms: Withdrawal and medication side effects. The following were  rated moderate (3) to severe (4) on TCRS:  Moderate (3): none  Severe (4): none    Comments: Patient was seen in clinic today for follow up.  Patient continues  to smoke  3 cigarettes per day. Patient's CO monitor was 5 ppm.  Patient remains on the prescribed tobacco cessation medication regimen of  150 mg Wellbutrin SR BID without any negative side effects at this time.  Patient reports talking in his sleep.  Refill sent to pharmacy.  Encouraged patient to try a rate reduction to about  2 cpd for next visit.  Encouraged patient to move his cigarettes so they are out of site.  Encouraged patient to change routine.  Encouraged patient not to buy any more cigarettes.  Patient states that he is not mentally ready to quit.  Encouraged patient to keep positive attitude and stay focused on his quit. We reviewed coping with urges/cravings to smoke and motivation to quit.  Patient will continue bi weekly sessions.    Diagnosis: F17.200    Next Visit: 2 weeks

## 2023-05-16 NOTE — Clinical Note
Patient was seen in clinic today for follow up.  Patient continues  to smoke  3 cigarettes per day. Patient's CO monitor was 5 ppm.  Patient remains on the prescribed tobacco cessation medication regimen of  150 mg Wellbutrin SR BID without any negative side effects at this time.  Patient reports talking in his sleep.  Refill sent to pharmacy.  Encouraged patient to try a rate reduction to about  2 cpd for next visit.  Encouraged patient to move his cigarettes so they are out of site.  Encouraged patient to change routine.  Encouraged patient not to buy any more cigarettes.  Patient states that he is not mentally ready to quit.  Encouraged patient to keep positive attitude and stay focused on his quit. We reviewed coping with urges/cravings to smoke and motivation to quit.  Patient will continue bi weekly sessions.

## 2023-05-23 ENCOUNTER — HOSPITAL ENCOUNTER (OUTPATIENT)
Dept: SLEEP MEDICINE | Facility: OTHER | Age: 68
Discharge: HOME | End: 2023-05-23
Attending: INTERNAL MEDICINE
Payer: MEDICARE

## 2023-05-23 ENCOUNTER — TELEPHONE (OUTPATIENT)
Dept: SLEEP MEDICINE | Facility: OTHER | Age: 68
End: 2023-05-23
Payer: MEDICARE

## 2023-05-23 DIAGNOSIS — G47.30 SLEEP APNEA, UNSPECIFIED TYPE: ICD-10-CM

## 2023-05-23 DIAGNOSIS — F51.09 OTHER INSOMNIA NOT DUE TO A SUBSTANCE OR KNOWN PHYSIOLOGICAL CONDITION: ICD-10-CM

## 2023-05-23 DIAGNOSIS — G47.10 HYPERSOMNOLENCE: ICD-10-CM

## 2023-05-23 PROCEDURE — 95810 POLYSOM 6/> YRS 4/> PARAM: CPT | Mod: HCNC

## 2023-05-24 PROBLEM — G47.30 SLEEP APNEA: Status: ACTIVE | Noted: 2023-05-24

## 2023-05-24 NOTE — PROGRESS NOTES
A baseline PSG study was performed on Gurwinder Hair.The following was explained to the pt prior to the study: the time to bed and wake time, the set-up process timeframe and the purpose of each sensor, the reason (if sensors fall off) the technician will need to enter the room during the night, the possibility of the tech fitting a PAP mask on pt for treatment in the middle of the night, and how to call out for assistance during the night. A post-study letter was handed to the pt in the morning.

## 2023-05-29 ENCOUNTER — PATIENT MESSAGE (OUTPATIENT)
Dept: SLEEP MEDICINE | Facility: CLINIC | Age: 68
End: 2023-05-29

## 2023-05-29 DIAGNOSIS — G47.33 OSA (OBSTRUCTIVE SLEEP APNEA): Primary | ICD-10-CM

## 2023-05-29 DIAGNOSIS — J96.12 CHRONIC RESPIRATORY FAILURE WITH HYPERCAPNIA: ICD-10-CM

## 2023-05-29 PROCEDURE — 95811 POLYSOM 6/>YRS CPAP 4/> PARM: CPT | Mod: 26,HCNC,, | Performed by: INTERNAL MEDICINE

## 2023-05-29 PROCEDURE — 95811 PR POLYSOMNOGRAPHY W/CPAP: ICD-10-PCS | Mod: 26,HCNC,, | Performed by: INTERNAL MEDICINE

## 2023-05-29 NOTE — PROCEDURES
"[facility]    Split-Night Study Interpretation Report    Patient Name:  Gurwinder Hair  MRN#:  [id number]  :  1955  Study Date:  2023  Referring Provider:  Damian Lin    Indications for Polysomnography:  The patient is a 68 year old Male who is 5' 6" and weighs 213.0 lbs.  His BMI equals 34.6.  [place] was - and Neck Circumference was -.  A diagnostic polysomnogram was performed to evaluate for -.  After 122.5 minutes of sleep time the patient exhibited sufficient respiratory events qualifying him for a PAP trial which was then initiated.     Polysomnogram Data:  A full night polysomnogram was performed recording the standard physiologic parameters including EEG, EOG, EMG, EKG, nasal and oral airflow.  Respiratory parameters of chest and abdominal movements are recorded with [place]-Crystal motion transducers.  Oxygen saturation was recorded by pulse oximetry.    Sleep Architecture:  The total recording time of the diagnostic portion of the study was 227.6 minutes.  The total sleep time was 122.5 minutes.  The patient spent 38.0% of total sleep time in Stage N1, 52.7% in Stage N2, 7.8% in Stages N3, and 1.6% in REM.  Sleep latency was 29.6 minutes.  REM latency was 78.0 minutes.  Sleep Efficiency was 53.8%.  Total wake time was 105.5 minutes for a total wake percentage of 35.1%.  Wake after Sleep Onset was 75.5 minutes.     At 01:05:27 AM the patient was placed on PAP treatment and was titrated at pressures ranging from 5* cm/H20 up to 14/10/0** cm/H20.  The total recording time of the treatment portion of the study was 261.6 minutes.  The total sleep time was 144.0 minutes.  During the treatment portion of the study, the patient spent 32.3% of total sleep time in Stage N1, 41.7% in Stage N2, 4.2% in Stages N3, and 21.9% in REM.  Sleep latency was 35.0 minutes.  REM latency was 47.5 minutes.  Sleep Efficiency was 55.0%.  Total wake time was 118.0 minutes for a total wake percentage of " 45.0%.  Wake after Sleep Onset was 82.5 minutes.     Respiratory Summary:  During the diagnostic portion of the study, the polysomnogram revealed a presence of 3 obstructive, - central, and - mixed apneas resulting in an Apnea index of 1.5 events per hour.  There were 99 hypopneas resulting in a Hypopnea index of 48.5 events per hour.  The combined Apnea/Hypopnea index was 50.0 events per hour.  There were a total of - RERA events resulting in a Respiratory Disturbance Index (RDI) of 50.0 events per hour.  Lowest oxygen saturation was 78.0% and time spent ?88% oxygen saturation was 138.2 minutes (62.8%).    During the treatment portion of the study, the polysomnogram revealed a presence of - obstructive, 1 central, and - mixed apneas resulting in an Apnea index of 0.4 events per hour.  There were 30 hypopneas resulting in a Hypopnea index of 12.5 events per hour.  The combined Apnea/Hypopnea index was 12.9 events per hour.  There were a total of - RERA events resulting in a Respiratory Disturbance Index (RDI) of 12.9 events per hour.  Lowest oxygen saturation was 83.0% and time spent ?88% oxygen saturation was 106.6 minutes (40.8%).      Limb Movement Activity:  During the diagnostic portion of the study, there were - limb movements recorded.      Cardiac: single lead EKG revealed normal sinus rhythm     PAP titration:    Mask used in the study: P10 nasal pillow- Large  PAP = 12 cwp was effective in supine N2 sleep and largely effective in lateral REM    Oxygenation:  During the diagnostic portion of the study, hypoxemia was only observed in relation to obstructive events.    During the titration study, the patient had hypoxemia (Spo2 83%) in the absence of obstructive events (AHI <10).     Impression:  -obstructive sleep apnea   -Sleep-related hypoxemia (G47.36)    Recommendations:    -initial auto-CPAP settings of CPAP min = 12 cwp  and CPAP max =  16 cwp are recommended  -supplemental oxygen starting at 2lpm to  "be bled into CPAP is recommended given the residual hypoxemia seen at 12 cwp with AHI <10.  -the patient has follow up with Sleep Medicine        Damian Lin MD    (This Sleep Study was interpreted by a Board Certified Sleep Specialist who conducted an epoch-by-epoch review of the entire raw data recording.)  (The indication for this sleep study was reviewed and deemed appropriate by AAS Practice Parameters or other reasons by a Board Certified Sleep Specialist.)    [facility]    Split-Night Report    Patient Name: Gurwinder Hair Study Date: 5/23/2023   YOB: 1955 MRN #: [id number]   Age: 68 year NOMI #: [id number]   Sex: Male Referring Provider: Damian Lin   Height: 5' 6" Recording Tech: James Cabral RPSGT   Weight: 213.0 lbs Scoring Tech: Elmer Nguyen RRT RPSGT   BMI: 34.6 Interpreting Physician: -   ESS: - Neck Circumference: -   Study Overview    DIAGNOSTIC TREATMENT   Lights Off: 09:17:30 PM Lights Off: 01:05:04 AM   Lights On: 01:05:04 AM Lights On: 05:26:41 AM   Time in Bed: 227.6 min. Time in Bed: 261.6 min.   Total Sleep Time: 122.5 min. Total Sleep Time: 144.0 min.   Sleep Efficiency: 53.8% Sleep Efficiency: 55.0%   Sleep Latency: 29.6 min. Sleep Latency: 35.0 min.   REM Latency from Sleep Onset: 78.0 min. REM Latency from Sleep Onset: 47.5 min.   Wake After Sleep Onset: 75.5 min. Wake After Sleep Onset: 82.5 min.     DIAGNOSTIC TREATMENT    Count Index  Count Index   Awakenings: 36 17.6 Awakenings: 69 28.8   Arousals: 104 50.9 Arousals: 76 31.7   Apneas & Hypopneas: 102 50.0 Apneas & Hypopneas: 31 12.9    Limb Movements: - - Limb Movements: - -   Snores: - - Snores: - -   Desaturations: 85 41.6 Desaturations: 44 18.3   Minimum SpO2 TST: 78.0% Minimum SpO2 TST: 83.0%        Sleep Architecture     DIAGNOSTIC TREATMENT ENTIRE NIGHT   Stages Time (min) % TST Time (min) % TST Time (min) % TST   WAKE 105.5  118.0  223.5    Stage N1 46.5 38.0% 46.5 32.3% 93.0 34.9% "   Stage N2 64.5 52.7% 60.0 41.7% 124.5 46.7%   Stage N3 9.5 7.8% 6.0 4.2% 15.5 5.8%   REM 2.0 1.6% 31.5 21.9% 33.5 12.6%       Arousal Summary     DIAGNOSTIC TREATMENT    NREM REM TST Index NREM REM TST Index   Respiratory Arousals 62 - 62 30.4 5 5 10 4.2   PLM Arousals - - - - - - - -   Isolated LM Arousals - - - - - - - -   Spontaneous Arousals 42 - 42 20.6 56 10 66 27.5   Total 104 - 104 50.9 61 15 76 31.7       Respiratory Summary    DIAGNOSTIC By Sleep Stage By Body Position Total    NREM REM Supine Non-Supine    Time (min) 120.5 2.0 - 122.5 122.5           Obstructive Apnea 3 - - 3 3   Mixed Apnea - - - - -   Central Apnea - - - - -   Total Apneas 3 - - 3 3   Total Apnea Index 1.5 - - 1.5 1.5           Total Hypopnea 99 - - 99 99   Total Hypopnea Index 49.3 - - 48.5 48.5           Apnea & Hypopnea 102 - - 102 102   Apnea & Hypopnea Index 50.8 - - 50.0 50.0           RERAs - - - - -   RERA Index - - - - -           RDI 50.8 - - 50.0 50.0     TREATMENT By Sleep Stage By Body Position Total    NREM REM Supine Non-Supine    Time (min) 112.5 31.5 41.0 103.0 144.0           Obstructive Apnea - - - - -   Mixed Apnea - - - - -   Central Apnea 1 - 1 - 1   Total Apneas 1 - 1 - 1   Total Apnea Index 0.5 - 1.5 - 0.4           Total Hypopnea 18 12 18 12 30   Total Hypopnea Index 9.6 22.9 26.3 7.0 12.5           Apnea & Hypopnea 19 12 19 12 31   Apnea & Hypopnea Index 10.1 22.9 27.8 7.0 12.9           RERAs - - - - -   RERA Index - - - - -           RDI 10.1 22.9 27.8 7.0 12.9     Scoring Criteria: Hypopneas scored at 3% desaturation criteria.    Respiratory Event Durations     DIAGNOSTIC TREATMENT   Apnea NREM REM NREM REM   Average (seconds) 11.7 - 10.1 -   Maximum (seconds) 12.5 - 10.1 -   Hypopnea       Average (seconds) 14.9 - 13.9 18.4   Maximum (seconds) 41.0 - 19.8 34.2       Oxygen Saturation Summary     DIAGNOSTIC TREATMENT    Wake NREM REM TST Wake NREM REM TST   Average SpO2 88.1% 87.7% 81.8% 87.6% 89.1% 88.5%  88.2% 88.4%   Minimum SpO2 76.0% 84.0% 78.0% 78.0%  83.0% 83.0% 84.0% 83.0%    Maximum SpO2 95.0% 93.0% 86.0% 93.0%  95.0% 93.0% 93.0% 93.0%     DIAGNOSTIC   Oxygen Saturation Distribution    Range (%) Time in range (min) Time in range (%)   90.0 - 100.0 7.6 6.2%   80.0 - 90.0 114.1 93.2%   70.0 - 80.0 0.8 0.6%   60.0 - 70.0 - -   50.0 - 60.0 - -   0.0 - 50.0 - -   Time Spent ?88% SpO2    Range (%) Time in range (min) Time in range (%)   0.0 - 88.0 81.5 66.5%          Count Index   Desaturations 85 41.6      TREATMENT   Oxygen Saturation Distribution    Range (%) Time in range (min) Time in range (%)   90.0 - 100.0 37.2 14.2%   80.0 - 90.0 222.7 85.2%   70.0 - 80.0 - -   60.0 - 70.0 - -   50.0 - 60.0 - -   0.0 - 50.0 - -   Time Spent ?88% SpO2    Range (%) Time in range (min) Time in range (%)   0.0 - 88.0 106.6 40.8%      Count Index   Desaturations 44 18.3      Limb Movement Summary     DIAGNOSTIC TREATMENT    Count Index Count Index   Isolated Limb Movements - - - -   Periodic Limb Movements (PLMs) - - - -   Total Limb Movements - - - -     Cardiac Summary     DIAGNOSTIC TREATMENT    Wake NREM REM Total Wake NREM REM Total   Average AZ (BPM) 73.3 72.5 70.0 72.8 70.5 69.2 65.6 69.4   Minimum AZ (BPM) 60.0 62.0 57.0 57.0 56.0 59.0 56.0 56.0   Maximum AZ (BPM) 98.0 84.0 81.0 98.0 96.0 83.0 90.0 96.0         Diagnostic EtCO2    Stage Min (mmHg) Average (mmHg) Max (mmHg)   Wake - - -   NREM(1+2+3) - - -   REM - - -       Range (mmHg) Time in range (min) Time in range (%)   - - -   - - -   - - -   - - -   - - -       TcCO2 Summary    DIAGNOSTIC    Stage Min (mmHg) Average (mmHg) Max (mmHg)   Wake - - -   NREM(1+2+3) - - -   REM - - -       Range (mmHg) Time in range (min) Time in range (%)   20.0 - 40.0 - -   40.0 - 50.0 - -   50.0 - 55.0 - -   55.0 - 100.0 - -   Excluded data <20.0 & >65.0 228.0 100.0%       TREATMENT    Stage Min (mmHg) Average (mmHg) Max (mmHg)   Wake - - -   NREM(1+2+3) - - -   REM - - -        Range (mmHg) Time in range (min) Time in range (%)   20.0 - 40.0 - -   40.0 - 50.0 - -   50.0 - 55.0 - -   55.0 - 100.0 - -   Excluded data <20.0 & >65.0 262.0 100.0%       Comments    -      Titration Summary    PAP Device PAP Level O2 Level Time (min) TST (min) NREM (min) REM (min) Wake (min) Sleep Eff% OA# CA# MA# Hyp# AHI RERA RDI Min SpO2 SpO2 ?88% (min) Ar. Index   - Off - 228.0 122.5 120.5 2.0 105.5 53.7% 3 - - 99 50.0 - 50.0 78.0  81.5 50.9   CPAP 5 - 20.5 0.0 0.0 0.0 20.5 0.0%             CPAP 6 - 23.0 6.5 6.5 0.0 16.5 28.3% - - - - - - - 87.0  4.3 64.6   CPAP 7 - 18.5 15.0 15.0 0.0 3.5 81.1% - - - 2 8.0 - 8.0 84.0  13.3 24.0   CPAP 8 - 20.0 11.0 11.0 0.0 9.0 55.0% - - - 2 10.9 - 10.9 83.0  10.9 16.4   CPAP 9 - 10.0 4.5 0.5 4.0 5.5 45.0% - - - 3 40.0 - 40.0 84.0  3.4 40.0   CPAP 10 - 20.5 9.5 0.0 9.5 11.0 46.3% - - - 7 44.2 - 44.2 84.0  2.9 37.9   CPAP 11 - 30.5 20.0 11.0 9.0 10.5 65.6% - - - 5 15.0 - 15.0 85.0  10.1 42.0   CPAP 12 - 71.0 50.5 45.5 5.0 20.5 71.1% - - - 2 2.4 - 2.4 86.0  9.4 23.8   CPAP 13 - 33.0 17.0 13.0 4.0 16.0 51.5% - 1 - 4 17.6 - 17.6 86.0  5.3 38.8   CPAP 14 - 11.0 8.0 8.0 0.0 3.0 72.7% - - - 3 22.5 - 22.5 87.0  4.3 37.5   BiLevel 14/10/0 - 4.0 2.0 2.0 0.0 2.0 50.0% - - - 2 60.0 - 60.0 87.0  0.8 30.0

## 2023-05-30 ENCOUNTER — CLINICAL SUPPORT (OUTPATIENT)
Dept: SMOKING CESSATION | Facility: CLINIC | Age: 68
End: 2023-05-30
Payer: COMMERCIAL

## 2023-05-30 DIAGNOSIS — F17.200 NICOTINE DEPENDENCE: Primary | ICD-10-CM

## 2023-05-30 PROCEDURE — 99402 PR PREVENT COUNSEL,INDIV,30 MIN: ICD-10-PCS | Mod: S$GLB,,,

## 2023-05-30 PROCEDURE — 99402 PREV MED CNSL INDIV APPRX 30: CPT | Mod: S$GLB,,,

## 2023-05-30 NOTE — PROGRESS NOTES
Individual Follow-Up Form    5/30/2023    Quit Date:     Clinical Status of Patient: Outpatient    Continuing Medication: no     Target Symptoms: Withdrawal and medication side effects. The following were  rated moderate (3) to severe (4) on TCRS:  Moderate (3): rashes  Severe (4):     Comments: Patient was seen in clinic today for follow up.  Patient continues  to smoke  3-4 cigarettes per day.  Patient states that he discontinued Wellbutrin due to rashes.  Patient is not making any progress over the last few months.  Patient states that he lacks of willpower.  We discussed willpower and willingness to quit.  We reviewed the importance of quitting and health benefits to expect.  We discussed ambivalence and willingness to quit.  Patient also states that he is not mentally ready to quit and would like to take a break from the program. Informed patient about future help or support if needed.  Contact information was given.    Diagnosis: F17.200    Next Visit: 2 weeks

## 2023-05-30 NOTE — Clinical Note
Patient was seen in clinic today for follow up.  Patient continues  to smoke  3-4 cigarettes per day.  Patient states that he discontinued Wellbutrin due to rashes.  Patient is not making any progress over the last few months.  Patient states that he lacks of willpower.  We discussed willpower and willingness to quit.  We reviewed the importance of quitting and health benefits to expect.  We discussed ambivalence and willingness to quit.  Patient also states that he is not mentally ready to quit and would like to take a break from the program. Informed patient about future help or support if needed.  Contact information was given.

## 2023-06-05 ENCOUNTER — TELEPHONE (OUTPATIENT)
Dept: SLEEP MEDICINE | Facility: CLINIC | Age: 68
End: 2023-06-05
Payer: MEDICARE

## 2023-06-07 ENCOUNTER — TELEPHONE (OUTPATIENT)
Dept: SMOKING CESSATION | Facility: CLINIC | Age: 68
End: 2023-06-07
Payer: MEDICARE

## 2023-06-14 NOTE — TELEPHONE ENCOUNTER
First attempt left message regarding smoking cessation quit 1 episode.    
31-Dec-2019
Closure 2 Information: This tab is for additional flaps and grafts, including complex repair and grafts and complex repair and flaps. You can also specify a different location for the additional defect, if the location is the same you do not need to select a new one. We will insert the automated text for the repair you select below just as we do for solitary flaps and grafts. Please note that at this time if you select a location with a different insurance zone you will need to override the ICD10 and CPT if appropriate.

## 2023-06-16 ENCOUNTER — PATIENT MESSAGE (OUTPATIENT)
Dept: ADMINISTRATIVE | Facility: HOSPITAL | Age: 68
End: 2023-06-16
Payer: MEDICARE

## 2023-06-19 ENCOUNTER — PATIENT MESSAGE (OUTPATIENT)
Dept: CARDIOLOGY | Facility: CLINIC | Age: 68
End: 2023-06-19
Payer: MEDICARE

## 2023-06-23 ENCOUNTER — TELEPHONE (OUTPATIENT)
Dept: CARDIOLOGY | Facility: CLINIC | Age: 68
End: 2023-06-23
Payer: MEDICARE

## 2023-06-26 ENCOUNTER — HOSPITAL ENCOUNTER (OUTPATIENT)
Dept: CARDIOLOGY | Facility: OTHER | Age: 68
Discharge: HOME | End: 2023-06-26
Attending: INTERNAL MEDICINE
Payer: MEDICARE

## 2023-06-26 DIAGNOSIS — I71.43 INFRARENAL ABDOMINAL AORTIC ANEURYSM (AAA) WITHOUT RUPTURE: ICD-10-CM

## 2023-06-26 LAB
ABDOMINAL INFRARENAL AORTA AP: 2.7 CM
ABDOMINAL INFRARENAL AORTA ED VEL: 11 CM/S
ABDOMINAL INFRARENAL AORTA PS VEL: 74 CM/S
ABDOMINAL INFRARENAL AORTA TRANS: 3.3 CM
ABDOMINAL JUXTARENAL AORTA AP: 2 CM
ABDOMINAL JUXTARENAL AORTA ED VEL: 15 CM/S
ABDOMINAL JUXTARENAL AORTA PS VEL: 69 CM/S
ABDOMINAL JUXTARENAL AORTA TRANS: 2.4 CM
ABDOMINAL LT COM ILIAC AP: 1.1 CM
ABDOMINAL LT COM ILIAC TRANS: 1.1 CM
ABDOMINAL LT COM ILIAC VEL: 90 CM/S
ABDOMINAL LT COM ILLIAC ED VEL: 18 CM/S
ABDOMINAL RT COM ILIAC AP: 1.3 CM
ABDOMINAL RT COM ILIAC TRANS: 1.7 CM
ABDOMINAL RT COM ILIAC VEL: 99 CM/S
ABDOMINAL RT COM ILLIAC ED VEL: 16 CM/S
ABDOMINAL SUPRARENAL AORTA AP: 2.2 CM
ABDOMINAL SUPRARENAL AORTA ED VEL: 17 CM/S
ABDOMINAL SUPRARENAL AORTA PS VEL: 61 CM/S
ABDOMINAL SUPRARENAL AORTA TRANS: 2.1 CM
LEFT EXTERNAL ILIAC PSV: 84 CM/S
OHS CV AAA LARGEST SIZE: 3.3 CM
RIGHT EXTERNAL ILLIAC PSV: 80 CM/S

## 2023-06-26 PROCEDURE — 76706 US ABDL AORTA SCREEN AAA: CPT | Mod: 26,HCNC,, | Performed by: INTERNAL MEDICINE

## 2023-06-26 PROCEDURE — 76706 CV US AAA SCREENING (CUPID ONLY): ICD-10-PCS | Mod: 26,HCNC,, | Performed by: INTERNAL MEDICINE

## 2023-06-26 PROCEDURE — 76706 US ABDL AORTA SCREEN AAA: CPT | Mod: HCNC

## 2023-07-11 ENCOUNTER — OFFICE VISIT (OUTPATIENT)
Dept: PRIMARY CARE CLINIC | Facility: CLINIC | Age: 68
End: 2023-07-11
Payer: MEDICARE

## 2023-07-11 ENCOUNTER — LAB VISIT (OUTPATIENT)
Dept: PRIMARY CARE CLINIC | Facility: CLINIC | Age: 68
End: 2023-07-11
Payer: MEDICARE

## 2023-07-11 VITALS
HEART RATE: 69 BPM | SYSTOLIC BLOOD PRESSURE: 135 MMHG | DIASTOLIC BLOOD PRESSURE: 79 MMHG | BODY MASS INDEX: 34.68 KG/M2 | WEIGHT: 214.81 LBS

## 2023-07-11 DIAGNOSIS — R21 RASH AND NONSPECIFIC SKIN ERUPTION: Primary | ICD-10-CM

## 2023-07-11 DIAGNOSIS — N18.32 STAGE 3B CHRONIC KIDNEY DISEASE: ICD-10-CM

## 2023-07-11 DIAGNOSIS — I10 ESSENTIAL HYPERTENSION: ICD-10-CM

## 2023-07-11 DIAGNOSIS — I71.43 INFRARENAL ABDOMINAL AORTIC ANEURYSM (AAA) WITHOUT RUPTURE: ICD-10-CM

## 2023-07-11 DIAGNOSIS — L29.9 PRURITUS, UNSPECIFIED: ICD-10-CM

## 2023-07-11 LAB
ALBUMIN SERPL BCP-MCNC: 4.4 G/DL (ref 3.5–5.2)
ALP SERPL-CCNC: 78 U/L (ref 55–135)
ALT SERPL W/O P-5'-P-CCNC: 27 U/L (ref 10–44)
ANION GAP SERPL CALC-SCNC: 13 MMOL/L (ref 8–16)
AST SERPL-CCNC: 25 U/L (ref 10–40)
BILIRUB SERPL-MCNC: 0.8 MG/DL (ref 0.1–1)
BUN SERPL-MCNC: 14 MG/DL (ref 8–23)
CALCIUM SERPL-MCNC: 9.7 MG/DL (ref 8.7–10.5)
CHLORIDE SERPL-SCNC: 107 MMOL/L (ref 95–110)
CHOLEST SERPL-MCNC: 166 MG/DL (ref 120–199)
CHOLEST/HDLC SERPL: 4.9 {RATIO} (ref 2–5)
CO2 SERPL-SCNC: 25 MMOL/L (ref 23–29)
CREAT SERPL-MCNC: 1.2 MG/DL (ref 0.5–1.4)
EST. GFR  (NO RACE VARIABLE): >60 ML/MIN/1.73 M^2
GLUCOSE SERPL-MCNC: 87 MG/DL (ref 70–110)
HDLC SERPL-MCNC: 34 MG/DL (ref 40–75)
HDLC SERPL: 20.5 % (ref 20–50)
LDLC SERPL CALC-MCNC: 103 MG/DL (ref 63–159)
NONHDLC SERPL-MCNC: 132 MG/DL
POTASSIUM SERPL-SCNC: 3.5 MMOL/L (ref 3.5–5.1)
PROT SERPL-MCNC: 7.8 G/DL (ref 6–8.4)
SODIUM SERPL-SCNC: 145 MMOL/L (ref 136–145)
TRIGL SERPL-MCNC: 145 MG/DL (ref 30–150)

## 2023-07-11 PROCEDURE — 88313 SPECIAL STAINS GROUP 2: CPT | Mod: HCNC | Performed by: PATHOLOGY

## 2023-07-11 PROCEDURE — 88312 SPECIAL STAINS GROUP 1: CPT | Mod: HCNC | Performed by: PATHOLOGY

## 2023-07-11 PROCEDURE — 88305 TISSUE EXAM BY PATHOLOGIST: CPT | Mod: HCNC | Performed by: PATHOLOGY

## 2023-07-11 PROCEDURE — 88305 TISSUE EXAM BY PATHOLOGIST: ICD-10-PCS | Mod: 26,HCNC,, | Performed by: PATHOLOGY

## 2023-07-11 PROCEDURE — 1159F MED LIST DOCD IN RCRD: CPT | Mod: HCNC,CPTII,S$GLB, | Performed by: STUDENT IN AN ORGANIZED HEALTH CARE EDUCATION/TRAINING PROGRAM

## 2023-07-11 PROCEDURE — 4010F ACE/ARB THERAPY RXD/TAKEN: CPT | Mod: HCNC,CPTII,S$GLB, | Performed by: STUDENT IN AN ORGANIZED HEALTH CARE EDUCATION/TRAINING PROGRAM

## 2023-07-11 PROCEDURE — 3078F PR MOST RECENT DIASTOLIC BLOOD PRESSURE < 80 MM HG: ICD-10-PCS | Mod: HCNC,CPTII,S$GLB, | Performed by: STUDENT IN AN ORGANIZED HEALTH CARE EDUCATION/TRAINING PROGRAM

## 2023-07-11 PROCEDURE — 1101F PT FALLS ASSESS-DOCD LE1/YR: CPT | Mod: HCNC,CPTII,S$GLB, | Performed by: STUDENT IN AN ORGANIZED HEALTH CARE EDUCATION/TRAINING PROGRAM

## 2023-07-11 PROCEDURE — 88305 TISSUE EXAM BY PATHOLOGIST: CPT | Mod: 26,HCNC,, | Performed by: PATHOLOGY

## 2023-07-11 PROCEDURE — 88312 SPECIAL STAINS GROUP 1: CPT | Mod: 26,HCNC,, | Performed by: PATHOLOGY

## 2023-07-11 PROCEDURE — 4010F PR ACE/ARB THEARPY RXD/TAKEN: ICD-10-PCS | Mod: HCNC,CPTII,S$GLB, | Performed by: STUDENT IN AN ORGANIZED HEALTH CARE EDUCATION/TRAINING PROGRAM

## 2023-07-11 PROCEDURE — 80061 LIPID PANEL: CPT | Mod: HCNC | Performed by: INTERNAL MEDICINE

## 2023-07-11 PROCEDURE — 3288F PR FALLS RISK ASSESSMENT DOCUMENTED: ICD-10-PCS | Mod: HCNC,CPTII,S$GLB, | Performed by: STUDENT IN AN ORGANIZED HEALTH CARE EDUCATION/TRAINING PROGRAM

## 2023-07-11 PROCEDURE — 3008F PR BODY MASS INDEX (BMI) DOCUMENTED: ICD-10-PCS | Mod: HCNC,CPTII,S$GLB, | Performed by: STUDENT IN AN ORGANIZED HEALTH CARE EDUCATION/TRAINING PROGRAM

## 2023-07-11 PROCEDURE — 3075F PR MOST RECENT SYSTOLIC BLOOD PRESS GE 130-139MM HG: ICD-10-PCS | Mod: HCNC,CPTII,S$GLB, | Performed by: STUDENT IN AN ORGANIZED HEALTH CARE EDUCATION/TRAINING PROGRAM

## 2023-07-11 PROCEDURE — 3008F BODY MASS INDEX DOCD: CPT | Mod: HCNC,CPTII,S$GLB, | Performed by: STUDENT IN AN ORGANIZED HEALTH CARE EDUCATION/TRAINING PROGRAM

## 2023-07-11 PROCEDURE — 99214 PR OFFICE/OUTPT VISIT, EST, LEVL IV, 30-39 MIN: ICD-10-PCS | Mod: HCNC,S$GLB,, | Performed by: STUDENT IN AN ORGANIZED HEALTH CARE EDUCATION/TRAINING PROGRAM

## 2023-07-11 PROCEDURE — 99999 PR PBB SHADOW E&M-EST. PATIENT-LVL III: CPT | Mod: PBBFAC,HCNC,, | Performed by: STUDENT IN AN ORGANIZED HEALTH CARE EDUCATION/TRAINING PROGRAM

## 2023-07-11 PROCEDURE — 1101F PR PT FALLS ASSESS DOC 0-1 FALLS W/OUT INJ PAST YR: ICD-10-PCS | Mod: HCNC,CPTII,S$GLB, | Performed by: STUDENT IN AN ORGANIZED HEALTH CARE EDUCATION/TRAINING PROGRAM

## 2023-07-11 PROCEDURE — 99999 PR PBB SHADOW E&M-EST. PATIENT-LVL III: ICD-10-PCS | Mod: PBBFAC,HCNC,, | Performed by: STUDENT IN AN ORGANIZED HEALTH CARE EDUCATION/TRAINING PROGRAM

## 2023-07-11 PROCEDURE — 3078F DIAST BP <80 MM HG: CPT | Mod: HCNC,CPTII,S$GLB, | Performed by: STUDENT IN AN ORGANIZED HEALTH CARE EDUCATION/TRAINING PROGRAM

## 2023-07-11 PROCEDURE — 1159F PR MEDICATION LIST DOCUMENTED IN MEDICAL RECORD: ICD-10-PCS | Mod: HCNC,CPTII,S$GLB, | Performed by: STUDENT IN AN ORGANIZED HEALTH CARE EDUCATION/TRAINING PROGRAM

## 2023-07-11 PROCEDURE — 3075F SYST BP GE 130 - 139MM HG: CPT | Mod: HCNC,CPTII,S$GLB, | Performed by: STUDENT IN AN ORGANIZED HEALTH CARE EDUCATION/TRAINING PROGRAM

## 2023-07-11 PROCEDURE — 3288F FALL RISK ASSESSMENT DOCD: CPT | Mod: HCNC,CPTII,S$GLB, | Performed by: STUDENT IN AN ORGANIZED HEALTH CARE EDUCATION/TRAINING PROGRAM

## 2023-07-11 PROCEDURE — 88312 PR  SPECIAL STAINS,GROUP I: ICD-10-PCS | Mod: 26,HCNC,, | Performed by: PATHOLOGY

## 2023-07-11 PROCEDURE — 80053 COMPREHEN METABOLIC PANEL: CPT | Mod: HCNC | Performed by: INTERNAL MEDICINE

## 2023-07-11 PROCEDURE — 99214 OFFICE O/P EST MOD 30 MIN: CPT | Mod: HCNC,S$GLB,, | Performed by: STUDENT IN AN ORGANIZED HEALTH CARE EDUCATION/TRAINING PROGRAM

## 2023-07-11 PROCEDURE — 88313 PR  SPECIAL STAINS,GROUP II: ICD-10-PCS | Mod: 26,HCNC,, | Performed by: PATHOLOGY

## 2023-07-11 PROCEDURE — 88313 SPECIAL STAINS GROUP 2: CPT | Mod: 26,HCNC,, | Performed by: PATHOLOGY

## 2023-07-11 NOTE — PROGRESS NOTES
Subjective     Patient ID: Gurwinder Hair is a 68 y.o. male.    Chief Complaint: Rash    HPI    Gurwinder Hair is a 68 y.o. male who presents for rash to body.     Rash:   Onset of pruritic rash to face, arms, hands, back, chest, and legs which started about 1 month ago. Patient states his kids are the ones who brought this rash to his attention. Around the time the rash was noted he went over to water his neighbors plants, he also started using new body wash (Old Spice) around that time and the new use of Wellbutrin (for smoking cessation which he discontinued on 5/30/23), no new lotions or detergents. No new foods. He tried using Neosporin on one of the spots which did not help with healing. No pets. He is retired. He lives alone, 5 year old grandson comes to visit occasionally. No history of eczema or psoriasis.     Review of Systems   Constitutional:  Negative for fatigue and fever.   HENT:  Negative for nasal congestion, rhinorrhea and sore throat.    Respiratory:  Negative for cough, shortness of breath and wheezing.    Cardiovascular:  Negative for chest pain and palpitations.   Gastrointestinal:  Negative for abdominal pain, constipation, diarrhea, nausea and vomiting.   Genitourinary:  Negative for dysuria and hematuria.   Musculoskeletal:  Negative for back pain and neck pain.   Integumentary:  Positive for rash.   Neurological:  Negative for weakness, numbness and headaches.        Objective     Physical Exam  Constitutional:       Appearance: Normal appearance. He is not toxic-appearing.   HENT:      Head: Normocephalic and atraumatic.      Right Ear: Tympanic membrane, ear canal and external ear normal. There is no impacted cerumen.      Left Ear: Tympanic membrane, ear canal and external ear normal. There is no impacted cerumen.      Nose: No congestion or rhinorrhea.      Mouth/Throat:      Mouth: Mucous membranes are moist.      Pharynx: Oropharynx is clear. No oropharyngeal exudate or posterior  oropharyngeal erythema.   Eyes:      Pupils: Pupils are equal, round, and reactive to light.   Cardiovascular:      Rate and Rhythm: Normal rate and regular rhythm.      Heart sounds: Normal heart sounds. No murmur heard.  Pulmonary:      Effort: Pulmonary effort is normal. No respiratory distress.      Breath sounds: Normal breath sounds. No wheezing.   Abdominal:      General: Abdomen is flat. Bowel sounds are normal. There is no distension.      Palpations: Abdomen is soft.      Tenderness: There is no abdominal tenderness.   Musculoskeletal:         General: No swelling or tenderness.      Right lower leg: No edema.      Left lower leg: No edema.   Skin:     General: Skin is warm and dry.      Findings: Rash (0.5-1cm scaly, erythematous, raised, non-blanching, papules in different healing stages to arms, chest, back, and legs) present.   Neurological:      General: No focal deficit present.      Mental Status: He is alert and oriented to person, place, and time.   Psychiatric:         Mood and Affect: Mood normal.         Behavior: Behavior normal.          Assessment and Plan     Problem List Items Addressed This Visit          Cardiac/Vascular    Essential hypertension     Well controlled continue current regimen            Renal/    Stage 3b chronic kidney disease: stable cmp collected today     Other Visit Diagnoses       Rash and nonspecific skin eruption    -  Primary    Advised to discontinue use of Old Spice body wash, recommend use of gentle soaps such as Dove.     Relevant Orders    Specimen to Pathology Dermatology and skin neoplasms    Pruritus, unspecified        Relevant Orders    Excision of Benign Lesion          Follow up pending results.     No follow-ups on file.    Reshma GUIDO     I hereby acknowledge that I am relying upon documentation authored by a medical student working under my supervision and further I hereby attest that I have verified the student documentation or findings by  personally performing the physical exam and medical decision making activities of the Evaluation and Management service to be billed.    Mercedes Acharya MD

## 2023-07-20 ENCOUNTER — TELEPHONE (OUTPATIENT)
Dept: PRIMARY CARE CLINIC | Facility: CLINIC | Age: 68
End: 2023-07-20

## 2023-07-20 NOTE — TELEPHONE ENCOUNTER
----- Message from Mazin Noble sent at 7/20/2023  1:16 PM CDT -----  Contact: [place] [phone]  Kolby Messina skin biopsy provider requesting a call.    Please call and advise

## 2023-07-25 ENCOUNTER — OFFICE VISIT (OUTPATIENT)
Dept: CARDIOLOGY | Facility: CLINIC | Age: 68
End: 2023-07-25
Payer: MEDICARE

## 2023-07-25 VITALS
BODY MASS INDEX: 34.69 KG/M2 | SYSTOLIC BLOOD PRESSURE: 116 MMHG | OXYGEN SATURATION: 95 % | DIASTOLIC BLOOD PRESSURE: 80 MMHG | WEIGHT: 214.94 LBS | HEART RATE: 80 BPM

## 2023-07-25 DIAGNOSIS — I10 ESSENTIAL HYPERTENSION: Primary | ICD-10-CM

## 2023-07-25 DIAGNOSIS — I71.43 INFRARENAL ABDOMINAL AORTIC ANEURYSM (AAA) WITHOUT RUPTURE: ICD-10-CM

## 2023-07-25 DIAGNOSIS — E78.5 HYPERLIPIDEMIA, UNSPECIFIED HYPERLIPIDEMIA TYPE: ICD-10-CM

## 2023-07-25 DIAGNOSIS — Z72.0 TOBACCO ABUSE: ICD-10-CM

## 2023-07-25 PROCEDURE — 3288F PR FALLS RISK ASSESSMENT DOCUMENTED: ICD-10-PCS | Mod: HCNC,CPTII,S$GLB, | Performed by: INTERNAL MEDICINE

## 2023-07-25 PROCEDURE — 4010F PR ACE/ARB THEARPY RXD/TAKEN: ICD-10-PCS | Mod: HCNC,CPTII,S$GLB, | Performed by: INTERNAL MEDICINE

## 2023-07-25 PROCEDURE — 1159F PR MEDICATION LIST DOCUMENTED IN MEDICAL RECORD: ICD-10-PCS | Mod: HCNC,CPTII,S$GLB, | Performed by: INTERNAL MEDICINE

## 2023-07-25 PROCEDURE — 1101F PT FALLS ASSESS-DOCD LE1/YR: CPT | Mod: HCNC,CPTII,S$GLB, | Performed by: INTERNAL MEDICINE

## 2023-07-25 PROCEDURE — 3079F PR MOST RECENT DIASTOLIC BLOOD PRESSURE 80-89 MM HG: ICD-10-PCS | Mod: HCNC,CPTII,S$GLB, | Performed by: INTERNAL MEDICINE

## 2023-07-25 PROCEDURE — 3079F DIAST BP 80-89 MM HG: CPT | Mod: HCNC,CPTII,S$GLB, | Performed by: INTERNAL MEDICINE

## 2023-07-25 PROCEDURE — 3288F FALL RISK ASSESSMENT DOCD: CPT | Mod: HCNC,CPTII,S$GLB, | Performed by: INTERNAL MEDICINE

## 2023-07-25 PROCEDURE — 3008F BODY MASS INDEX DOCD: CPT | Mod: HCNC,CPTII,S$GLB, | Performed by: INTERNAL MEDICINE

## 2023-07-25 PROCEDURE — 3074F SYST BP LT 130 MM HG: CPT | Mod: HCNC,CPTII,S$GLB, | Performed by: INTERNAL MEDICINE

## 2023-07-25 PROCEDURE — 1126F AMNT PAIN NOTED NONE PRSNT: CPT | Mod: HCNC,CPTII,S$GLB, | Performed by: INTERNAL MEDICINE

## 2023-07-25 PROCEDURE — 99999 PR PBB SHADOW E&M-EST. PATIENT-LVL III: ICD-10-PCS | Mod: PBBFAC,HCNC,, | Performed by: INTERNAL MEDICINE

## 2023-07-25 PROCEDURE — 99214 OFFICE O/P EST MOD 30 MIN: CPT | Mod: HCNC,S$GLB,, | Performed by: INTERNAL MEDICINE

## 2023-07-25 PROCEDURE — 99214 PR OFFICE/OUTPT VISIT, EST, LEVL IV, 30-39 MIN: ICD-10-PCS | Mod: HCNC,S$GLB,, | Performed by: INTERNAL MEDICINE

## 2023-07-25 PROCEDURE — 99999 PR PBB SHADOW E&M-EST. PATIENT-LVL III: CPT | Mod: PBBFAC,HCNC,, | Performed by: INTERNAL MEDICINE

## 2023-07-25 PROCEDURE — 1126F PR PAIN SEVERITY QUANTIFIED, NO PAIN PRESENT: ICD-10-PCS | Mod: HCNC,CPTII,S$GLB, | Performed by: INTERNAL MEDICINE

## 2023-07-25 PROCEDURE — 3074F PR MOST RECENT SYSTOLIC BLOOD PRESSURE < 130 MM HG: ICD-10-PCS | Mod: HCNC,CPTII,S$GLB, | Performed by: INTERNAL MEDICINE

## 2023-07-25 PROCEDURE — 3008F PR BODY MASS INDEX (BMI) DOCUMENTED: ICD-10-PCS | Mod: HCNC,CPTII,S$GLB, | Performed by: INTERNAL MEDICINE

## 2023-07-25 PROCEDURE — 4010F ACE/ARB THERAPY RXD/TAKEN: CPT | Mod: HCNC,CPTII,S$GLB, | Performed by: INTERNAL MEDICINE

## 2023-07-25 PROCEDURE — 1101F PR PT FALLS ASSESS DOC 0-1 FALLS W/OUT INJ PAST YR: ICD-10-PCS | Mod: HCNC,CPTII,S$GLB, | Performed by: INTERNAL MEDICINE

## 2023-07-25 PROCEDURE — 1159F MED LIST DOCD IN RCRD: CPT | Mod: HCNC,CPTII,S$GLB, | Performed by: INTERNAL MEDICINE

## 2023-07-25 NOTE — PROGRESS NOTES
Cardiology    7/25/2023  9:58 AM    Problem list  Patient Active Problem List   Diagnosis    Other closed fractures of distal end of radius (alone)    Essential hypertension    Hyperlipidemia    Stage 3b chronic kidney disease    Tobacco abuse    History of bacterial pneumonia    History of solitary pulmonary nodule    Abdominal aortic aneurysm (AAA) without rupture    COPD, severity to be determined    CRI (chronic renal insufficiency)    Other chest pain    Gastroesophageal reflux disease without esophagitis    Lung nodule    Personal history of lung cancer    Sleep apnea       CC:  F/u    HPI:  He has been doing well he denies any chest pain or shortness of breath.  His abdominal ultrasound done last month showed stable AAA.      Medications  Current Outpatient Medications   Medication Sig Dispense Refill    atorvastatin (LIPITOR) 40 MG tablet Take 1 tablet (40 mg total) by mouth every evening. 90 tablet 1    ipratropium-albuteroL (COMBIVENT)  mcg/actuation inhaler Inhale 2 puffs into the lungs every 6 (six) hours as needed for Wheezing. Rescue 4 g 0    ipratropium-albuteroL (COMBIVENT)  mcg/actuation inhaler Inhale 2 puffs into the lungs every 6 (six) hours as needed for Wheezing. Rescue 4 g 0    losartan (COZAAR) 100 MG tablet Take 1 tablet (100 mg total) by mouth once daily. 90 tablet 3    pantoprazole (PROTONIX) 20 MG tablet Take 1 tablet (20 mg total) by mouth once daily. 90 tablet 1    amLODIPine (NORVASC) 10 MG tablet Take 1 tablet (10 mg total) by mouth once daily. 90 tablet 3    FLUAD QUAD 2022-23,65Y UP,,PF, 60 mcg (15 mcg x 4)/0.5 mL Syrg       MODERNA COVID BIVAL,6Y UP,,PF, 50 mcg/0.5 mL injection        No current facility-administered medications for this visit.      Prior to Admission medications    Medication Sig Start Date End Date Taking? Authorizing Provider   atorvastatin (LIPITOR) 40 MG tablet Take 1 tablet (40 mg total) by mouth every evening. 5/9/23 5/8/24 Yes Mercedes  MD Marck   ipratropium-albuteroL (COMBIVENT)  mcg/actuation inhaler Inhale 2 puffs into the lungs every 6 (six) hours as needed for Wheezing. Rescue 12/2/22  Yes Nicho Peres MD   ipratropium-albuteroL (COMBIVENT)  mcg/actuation inhaler Inhale 2 puffs into the lungs every 6 (six) hours as needed for Wheezing. Rescue 12/2/22  Yes Nicho Peres MD   losartan (COZAAR) 100 MG tablet Take 1 tablet (100 mg total) by mouth once daily. 3/6/23 3/5/24 Yes Mercedes Acharya MD   pantoprazole (PROTONIX) 20 MG tablet Take 1 tablet (20 mg total) by mouth once daily. 5/3/23 8/1/23 Yes Amelia Ramirez NP   amLODIPine (NORVASC) 10 MG tablet Take 1 tablet (10 mg total) by mouth once daily. 1/9/23 5/4/23  Mercedes Acharya MD   FLUAD QUAD 2022-23,65Y UP,,PF, 60 mcg (15 mcg x 4)/0.5 mL Syrg  10/4/22   Historical Provider   MODERNA COVID BIVAL,6Y UP,,PF, 50 mcg/0.5 mL injection  10/6/22   Historical Provider         History  Past Medical History:   Diagnosis Date    Asthma     Hyperlipidemia     Hypertension     Malignant neoplasm of right upper lobe of lung 12/13/2022     Past Surgical History:   Procedure Laterality Date    FINGER FRACTURE SURGERY      ROBOTIC BRONCHOSCOPY N/A 10/21/2022    Procedure: ROBOTIC BRONCHOSCOPY;  Surgeon: Jeremy Ghosh MD;  Location: [address];  Service: Pulmonary;  Laterality: N/A;     Social History     Socioeconomic History    Marital status:    Tobacco Use    Smoking status: Every Day     Packs/day: 1.00     Years: 55.00     Pack years: 55.00     Types: Cigarettes    Smokeless tobacco: Never   Substance and Sexual Activity    Alcohol use: Not Currently     Comment: social    Drug use: Never    Sexual activity: Not Currently   Social History Narrative    Tobacco: Initiated at age 11 yo; max use 1ppd    EtOH: None    Drug: None    Employment: Retired     Education: 1 year of college    Lives alone         2 adult children          Allergies  Review of  patient's allergies indicates:   Allergen Reactions    Codeine Itching    Wellbutrin [bupropion hcl] Rash                Review of Systems   Review of Systems   Constitutional: Negative for decreased appetite, fever and weight loss.   HENT:  Negative for congestion and nosebleeds.    Eyes:  Negative for double vision, vision loss in left eye, vision loss in right eye and visual disturbance.   Cardiovascular:  Negative for chest pain, claudication, cyanosis, dyspnea on exertion, irregular heartbeat, leg swelling, near-syncope, orthopnea, palpitations, paroxysmal nocturnal dyspnea and syncope.   Respiratory:  Negative for cough, hemoptysis, shortness of breath, sleep disturbances due to breathing, snoring, sputum production and wheezing.    Endocrine: Negative for cold intolerance and heat intolerance.   Skin:  Negative for nail changes and rash.   Musculoskeletal:  Negative for joint pain, muscle cramps, muscle weakness and myalgias.   Gastrointestinal:  Negative for change in bowel habit, heartburn, hematemesis, hematochezia, hemorrhoids and melena.   Neurological:  Negative for dizziness, focal weakness and headaches.       Physical Exam  Wt Readings from Last 1 Encounters:   07/25/23 97.5 kg (214 lb 15.2 oz)     BP Readings from Last 3 Encounters:   07/25/23 116/80   07/11/23 135/79   05/04/23 127/81     Pulse Readings from Last 1 Encounters:   07/25/23 80     Body mass index is 34.69 kg/m².    Physical Exam  Vitals reviewed.   Constitutional:       Appearance: He is well-developed. He is obese.   HENT:      Head: Atraumatic.   Eyes:      General: No scleral icterus.  Neck:      Vascular: Normal carotid pulses. No carotid bruit, hepatojugular reflux or JVD.   Cardiovascular:      Rate and Rhythm: Normal rate and regular rhythm.      Chest Wall: PMI is not displaced.      Pulses: Intact distal pulses.           Carotid pulses are 2+ on the right side and 2+ on the left side.       Radial pulses are 2+ on the right  side and 2+ on the left side.        Dorsalis pedis pulses are 2+ on the right side and 2+ on the left side.      Heart sounds: Normal heart sounds, S1 normal and S2 normal. No murmur heard.    No friction rub.   Pulmonary:      Effort: Pulmonary effort is normal. No respiratory distress.      Breath sounds: Normal breath sounds. No stridor. No wheezing or rales.   Chest:      Chest wall: No tenderness.   Abdominal:      General: Bowel sounds are normal.      Palpations: Abdomen is soft.   Musculoskeletal:      Cervical back: Neck supple. No edema.   Skin:     General: Skin is warm and dry.      Nails: There is no clubbing.   Neurological:      Mental Status: He is alert and oriented to person, place, and time.   Psychiatric:         Behavior: Behavior normal.         Thought Content: Thought content normal.           Assessment  1. Essential hypertension  Well controlled on meds, continue to monitor    2. Infrarenal abdominal aortic aneurysm (AAA) without rupture  stable    3. Hyperlipidemia, unspecified hyperlipidemia type  On statin    4. Tobacco abuse  unchagned        Plan and Discussion  Discussed his abdominal ultrasound findings which show stable AAA.  His blood pressure is well controlled recommend to continue current medications, follow low-sodium diet.  Encourage to exercise at least 30 minutes per day, 5 days per week.  Recommend to stop smoking.  Patient requests annual follow-up.  Will need abdominal ultrasound next year.    Follow Up  1 year      Constantino Peres MD, F.A.C.C, F.S.C.A.I.        30 minutes were spent in chart review, documentation and review of results, and evaluation, treatment, and counseling of patient on the same day of service.    Disclaimer: This document was created using voice recognition software ([employer]). Although it may be edited, this document may contain errors related to incorrect recognition of the spoken word. Please call the physician if clarification is  needed.

## 2023-08-01 DIAGNOSIS — R21 RASH AND NONSPECIFIC SKIN ERUPTION: ICD-10-CM

## 2023-08-01 DIAGNOSIS — R89.7 ABNORMALITY PRESENT ON GROSS PATHOLOGY: Primary | ICD-10-CM

## 2023-08-01 LAB
FINAL PATHOLOGIC DIAGNOSIS: NORMAL
GROSS: NORMAL
Lab: NORMAL
MICROSCOPIC EXAM: NORMAL

## 2023-08-15 ENCOUNTER — PES CALL (OUTPATIENT)
Dept: ADMINISTRATIVE | Facility: CLINIC | Age: 68
End: 2023-08-15
Payer: MEDICARE

## 2023-08-30 ENCOUNTER — TELEPHONE (OUTPATIENT)
Dept: PRIMARY CARE CLINIC | Facility: CLINIC | Age: 68
End: 2023-08-30
Payer: MEDICARE

## 2023-08-30 NOTE — TELEPHONE ENCOUNTER
Fri 10/27/23 12:45 PM [facility] Dermatology Michael Walsh MD New Patient - Derm ([facility]) Scheduled

## 2023-08-30 NOTE — TELEPHONE ENCOUNTER
----- Message from Mercedes Acharya MD sent at 8/3/2023  8:28 AM CDT -----  Please let patient know that I have referred him to Dermatology because his rash is some kind of inflammatory process that needs further evaluation.

## 2023-09-06 ENCOUNTER — TELEPHONE (OUTPATIENT)
Dept: ADMINISTRATIVE | Facility: CLINIC | Age: 68
End: 2023-09-06
Payer: MEDICARE

## 2023-09-08 ENCOUNTER — OFFICE VISIT (OUTPATIENT)
Dept: PRIMARY CARE CLINIC | Facility: CLINIC | Age: 68
End: 2023-09-08
Payer: MEDICARE

## 2023-09-08 VITALS
HEIGHT: 66 IN | DIASTOLIC BLOOD PRESSURE: 82 MMHG | OXYGEN SATURATION: 99 % | BODY MASS INDEX: 35.04 KG/M2 | HEART RATE: 83 BPM | WEIGHT: 218.06 LBS | SYSTOLIC BLOOD PRESSURE: 126 MMHG

## 2023-09-08 DIAGNOSIS — R26.9 ABNORMALITY OF GAIT AND MOBILITY: ICD-10-CM

## 2023-09-08 DIAGNOSIS — R79.9 ABNORMAL FINDING OF BLOOD CHEMISTRY, UNSPECIFIED: ICD-10-CM

## 2023-09-08 DIAGNOSIS — E78.5 HYPERLIPIDEMIA, UNSPECIFIED HYPERLIPIDEMIA TYPE: ICD-10-CM

## 2023-09-08 DIAGNOSIS — Z00.00 ENCOUNTER FOR PREVENTIVE HEALTH EXAMINATION: Primary | ICD-10-CM

## 2023-09-08 DIAGNOSIS — R07.89 OTHER CHEST PAIN: ICD-10-CM

## 2023-09-08 DIAGNOSIS — E87.6 HYPOKALEMIA: ICD-10-CM

## 2023-09-08 DIAGNOSIS — Z85.118 PERSONAL HISTORY OF LUNG CANCER: ICD-10-CM

## 2023-09-08 DIAGNOSIS — I10 ESSENTIAL HYPERTENSION: ICD-10-CM

## 2023-09-08 DIAGNOSIS — G47.30 SLEEP APNEA, UNSPECIFIED TYPE: ICD-10-CM

## 2023-09-08 DIAGNOSIS — N18.32 STAGE 3B CHRONIC KIDNEY DISEASE: ICD-10-CM

## 2023-09-08 DIAGNOSIS — K21.9 GASTROESOPHAGEAL REFLUX DISEASE WITHOUT ESOPHAGITIS: ICD-10-CM

## 2023-09-08 DIAGNOSIS — Z00.00 ENCOUNTER FOR MEDICARE ANNUAL WELLNESS EXAM: ICD-10-CM

## 2023-09-08 DIAGNOSIS — Z12.5 ENCOUNTER FOR SCREENING FOR MALIGNANT NEOPLASM OF PROSTATE: ICD-10-CM

## 2023-09-08 DIAGNOSIS — E66.01 SEVERE OBESITY (BMI 35.0-39.9) WITH COMORBIDITY: ICD-10-CM

## 2023-09-08 DIAGNOSIS — I71.43 INFRARENAL ABDOMINAL AORTIC ANEURYSM (AAA) WITHOUT RUPTURE: ICD-10-CM

## 2023-09-08 DIAGNOSIS — J44.9 COPD, SEVERITY TO BE DETERMINED: ICD-10-CM

## 2023-09-08 DIAGNOSIS — Z87.898 HISTORY OF SOLITARY PULMONARY NODULE: ICD-10-CM

## 2023-09-08 DIAGNOSIS — Z72.0 TOBACCO ABUSE: ICD-10-CM

## 2023-09-08 PROBLEM — Z87.01 HISTORY OF BACTERIAL PNEUMONIA: Status: RESOLVED | Noted: 2020-01-23 | Resolved: 2023-09-08

## 2023-09-08 PROBLEM — N18.9 CRI (CHRONIC RENAL INSUFFICIENCY): Status: RESOLVED | Noted: 2022-02-21 | Resolved: 2023-09-08

## 2023-09-08 PROCEDURE — 1101F PT FALLS ASSESS-DOCD LE1/YR: CPT | Mod: HCNC,CPTII,S$GLB, | Performed by: NURSE PRACTITIONER

## 2023-09-08 PROCEDURE — 3288F PR FALLS RISK ASSESSMENT DOCUMENTED: ICD-10-PCS | Mod: HCNC,CPTII,S$GLB, | Performed by: NURSE PRACTITIONER

## 2023-09-08 PROCEDURE — 1126F PR PAIN SEVERITY QUANTIFIED, NO PAIN PRESENT: ICD-10-PCS | Mod: HCNC,CPTII,S$GLB, | Performed by: NURSE PRACTITIONER

## 2023-09-08 PROCEDURE — 1159F PR MEDICATION LIST DOCUMENTED IN MEDICAL RECORD: ICD-10-PCS | Mod: HCNC,CPTII,S$GLB, | Performed by: NURSE PRACTITIONER

## 2023-09-08 PROCEDURE — 99999 PR PBB SHADOW E&M-EST. PATIENT-LVL IV: ICD-10-PCS | Mod: PBBFAC,HCNC,, | Performed by: NURSE PRACTITIONER

## 2023-09-08 PROCEDURE — 1101F PR PT FALLS ASSESS DOC 0-1 FALLS W/OUT INJ PAST YR: ICD-10-PCS | Mod: HCNC,CPTII,S$GLB, | Performed by: NURSE PRACTITIONER

## 2023-09-08 PROCEDURE — 3079F DIAST BP 80-89 MM HG: CPT | Mod: HCNC,CPTII,S$GLB, | Performed by: NURSE PRACTITIONER

## 2023-09-08 PROCEDURE — 1159F MED LIST DOCD IN RCRD: CPT | Mod: HCNC,CPTII,S$GLB, | Performed by: NURSE PRACTITIONER

## 2023-09-08 PROCEDURE — 3008F PR BODY MASS INDEX (BMI) DOCUMENTED: ICD-10-PCS | Mod: HCNC,CPTII,S$GLB, | Performed by: NURSE PRACTITIONER

## 2023-09-08 PROCEDURE — 3074F PR MOST RECENT SYSTOLIC BLOOD PRESSURE < 130 MM HG: ICD-10-PCS | Mod: HCNC,CPTII,S$GLB, | Performed by: NURSE PRACTITIONER

## 2023-09-08 PROCEDURE — 1126F AMNT PAIN NOTED NONE PRSNT: CPT | Mod: HCNC,CPTII,S$GLB, | Performed by: NURSE PRACTITIONER

## 2023-09-08 PROCEDURE — 3074F SYST BP LT 130 MM HG: CPT | Mod: HCNC,CPTII,S$GLB, | Performed by: NURSE PRACTITIONER

## 2023-09-08 PROCEDURE — 3008F BODY MASS INDEX DOCD: CPT | Mod: HCNC,CPTII,S$GLB, | Performed by: NURSE PRACTITIONER

## 2023-09-08 PROCEDURE — 1170F FXNL STATUS ASSESSED: CPT | Mod: HCNC,CPTII,S$GLB, | Performed by: NURSE PRACTITIONER

## 2023-09-08 PROCEDURE — G0439 PPPS, SUBSEQ VISIT: HCPCS | Mod: HCNC,S$GLB,, | Performed by: NURSE PRACTITIONER

## 2023-09-08 PROCEDURE — 4010F PR ACE/ARB THEARPY RXD/TAKEN: ICD-10-PCS | Mod: HCNC,CPTII,S$GLB, | Performed by: NURSE PRACTITIONER

## 2023-09-08 PROCEDURE — 3288F FALL RISK ASSESSMENT DOCD: CPT | Mod: HCNC,CPTII,S$GLB, | Performed by: NURSE PRACTITIONER

## 2023-09-08 PROCEDURE — 99999 PR PBB SHADOW E&M-EST. PATIENT-LVL IV: CPT | Mod: PBBFAC,HCNC,, | Performed by: NURSE PRACTITIONER

## 2023-09-08 PROCEDURE — 4010F ACE/ARB THERAPY RXD/TAKEN: CPT | Mod: HCNC,CPTII,S$GLB, | Performed by: NURSE PRACTITIONER

## 2023-09-08 PROCEDURE — 1170F PR FUNCTIONAL STATUS ASSESSED: ICD-10-PCS | Mod: HCNC,CPTII,S$GLB, | Performed by: NURSE PRACTITIONER

## 2023-09-08 PROCEDURE — G0439 PR MEDICARE ANNUAL WELLNESS SUBSEQUENT VISIT: ICD-10-PCS | Mod: HCNC,S$GLB,, | Performed by: NURSE PRACTITIONER

## 2023-09-08 PROCEDURE — 3079F PR MOST RECENT DIASTOLIC BLOOD PRESSURE 80-89 MM HG: ICD-10-PCS | Mod: HCNC,CPTII,S$GLB, | Performed by: NURSE PRACTITIONER

## 2023-09-08 NOTE — PROGRESS NOTES
"  Gurwinder Hair presented for an initial Medicare AWV and Health Risk Assessment  today. The following components were reviewed and updated:    Medical history  Family History  Social history  Allergies and Current Medications  Health Risk Assessment  Health Maintenance  Care Team    See Completed Assessments for Annual Wellness visit with in the encounter summary    The following assessments were completed:  Depression Screening  Cognitive function Screening  Timed Get Up Test  Whisper Test  Nutrition  Activities of Daily Living   PAQ      Vitals:    09/08/23 1140   BP: 126/82   BP Location: Left arm   Patient Position: Sitting   BP Method: Large (Automatic)   Pulse: 83   SpO2: 99%   Weight: 98.9 kg (218 lb 0.6 oz)   Height: 5' 6" (1.676 m)     Body mass index is 35.19 kg/m².   ]    Review for Opioid Screening: Pt does not have Rx for Opioids.  Review for Substance Use Disorders: Patient does not use substance.    Physical Exam  Constitutional:       Appearance: Normal appearance.   Cardiovascular:      Pulses: Normal pulses.   Pulmonary:      Effort: Pulmonary effort is normal. No respiratory distress.   Musculoskeletal:         General: Normal range of motion.      Cervical back: Normal range of motion.   Skin:     General: Skin is warm and dry.      Capillary Refill: Capillary refill takes 2 to 3 seconds.   Neurological:      Mental Status: He is alert and oriented to person, place, and time.        Diagnoses and health risks identified today and associated recommendations/orders:  1. Encounter for preventive health examination  Screenings performed,  as noted above. Personal preventive testing needs reviewed.   - Hemoglobin A1C; Future  - PSA, Screening; Future    2. Encounter for Medicare annual wellness exam  Screenings performed,  as noted above. Personal preventive testing needs reviewed.   - Ambulatory Referral/Consult to Enhanced Annual Wellness Visit (eAWV)    3. Essential hypertension  Stable, followed " by PCP.     4. COPD, severity to be determined  Stable, followed by PCP.     5. History of solitary pulmonary nodule  Stable, followed by PCP.     6. Infrarenal abdominal aortic aneurysm (AAA) without rupture  Stable, followed by PCP.     7. Hyperlipidemia, unspecified hyperlipidemia type  Stable, followed by PCP.     8. Other chest pain  Stable, followed by PCP.     9. Stage 3b chronic kidney disease  Stable, followed by PCP.     10. Personal history of lung cancer  Stable, followed by PCP.     11. Gastroesophageal reflux disease without esophagitis  Stable, followed by PCP.     12. Sleep apnea, unspecified type  Stable, followed by PCP.     13. Tobacco abuse  Stable, followed by PCP.     14. Abnormal finding of blood chemistry, unspecified  Stable, followed by PCP.   - Hemoglobin A1C; Future    15. Encounter for screening for malignant neoplasm of prostate  - PSA, Screening; Future    16. Hypokalemia  Stable, followed by PCP.     I offered to discuss advanced care planning, including how to pick a person who would make decisions for you if you were unable to make them for yourself, called a health care power of , and what kind of decisions you might make such as use of life sustaining treatments such as ventilators and tube feeding when faced with a life limiting illness recorded on a living will that they will need to know. (How you want to be cared for as you near the end of your natural life)     X Patient is interested in learning more about how to make advanced directives.  I provided them paperwork and offered to discuss this with them.    Provided Gurwinder with a 5-10 year written screening schedule and personal prevention plan. Recommendations were developed using the USPSTF age appropriate recommendations. Education, counseling, and referrals were provided as needed.  After Visit Summary printed and given to patient which includes a list of additional screenings\tests needed.    Follow up in about 1  year (around 9/8/2024), or Annual wellness examination.    Amelia Ramirez NP

## 2023-09-09 NOTE — PATIENT INSTRUCTIONS
Counseling and Referral of Other Preventative  (Italic type indicates deductible and co-insurance are waived)    Patient Name: Gurwinder Hair  Today's Date: 9/8/2023    Health Maintenance       Date Due Completion Date    PROSTATE-SPECIFIC ANTIGEN Never done ---    Hemoglobin A1c (Diabetic Prevention Screening) Never done ---    Influenza Vaccine (1) 06/30/2024 (Originally 9/1/2023) 10/4/2022    Override on 9/18/2021: Done ([facility])    COVID-19 Vaccine (3 - Jennifer risk series) 09/08/2024 (Originally 12/1/2022) 10/6/2022    Colorectal Cancer Screening 03/02/2024 3/2/2021    Lipid Panel 07/11/2028 7/11/2023    TETANUS VACCINE 08/12/2031 8/12/2021        Orders Placed This Encounter   Procedures    Hemoglobin A1C    PSA, Screening       The following information is provided to all patients.  This information is to help you find resources for any of the problems found today that may be affecting your health:                Living healthy guide: www.[facility].gov      Understanding Diabetes: www.diabetes.org      Eating healthy: www.cdc.gov/healthyweight      CDC home safety checklist: www.cdc.gov/steadi/patient.html      Agency on Aging: www.goea.[place].gov      Alcoholics anonymous (AA): www.aa.org      Physical Activity: www.amanuel.nih.gov/fj2oiuf      Tobacco use: www.quitwithusla.org

## 2023-09-12 ENCOUNTER — LAB VISIT (OUTPATIENT)
Dept: PRIMARY CARE CLINIC | Facility: CLINIC | Age: 68
End: 2023-09-12
Payer: MEDICARE

## 2023-09-12 DIAGNOSIS — R79.9 ABNORMAL FINDING OF BLOOD CHEMISTRY, UNSPECIFIED: ICD-10-CM

## 2023-09-12 DIAGNOSIS — Z00.00 ENCOUNTER FOR PREVENTIVE HEALTH EXAMINATION: ICD-10-CM

## 2023-09-12 DIAGNOSIS — Z12.5 ENCOUNTER FOR SCREENING FOR MALIGNANT NEOPLASM OF PROSTATE: ICD-10-CM

## 2023-09-12 LAB
COMPLEXED PSA SERPL-MCNC: 0.51 NG/ML (ref 0–4)
ESTIMATED AVG GLUCOSE: 105 MG/DL (ref 68–131)
HBA1C MFR BLD: 5.3 % (ref 4–5.6)

## 2023-09-12 PROCEDURE — 84153 ASSAY OF PSA TOTAL: CPT | Mod: HCNC | Performed by: NURSE PRACTITIONER

## 2023-09-12 PROCEDURE — 83036 HEMOGLOBIN GLYCOSYLATED A1C: CPT | Mod: HCNC | Performed by: NURSE PRACTITIONER

## 2023-09-18 ENCOUNTER — PATIENT MESSAGE (OUTPATIENT)
Dept: PRIMARY CARE CLINIC | Facility: CLINIC | Age: 68
End: 2023-09-18
Payer: MEDICARE

## 2023-09-21 NOTE — PROGRESS NOTES
9/22/2023    Radiation Oncology Follow-Up Visit      Prior Radiation History:    Site  Technique  Energy  Dose/Fx (Gy)  #Fx  Total Dose (Gy)  Start Date  End Date  Elapsed Days    RUL Lung  SBRT  6X  18  3 / 3  54  12/28/2022  1/3/2023  6        Is the patient female between ages 15-55:  no    Does the patient have a CIED:  no      Assessment   This is a 68 y.o. male with Stage IA2 (cT1b cN0 M0) RUL presumed NSCLC s/p robotic bronch w/ EBUS 10/21/22 with biopsy of FDG avid RUL nodule revealing atypical cells; lymph nodes negative. Lobectomy would carry high risk, so he underwent definitive SBRT 54 Gy in 3 fx completed 1/3/23.     No late toxicity from treatment. CT Chest today 9/22/23 demonstrates stable post-radiation changes in RUL and no evidence of new disease.          Plan   1) I will see him back in 6 months with CT Chest prior for re-staging.            CHIEF COMPLAINT: F/U after lung SBRT    HPI/Focused ROS: Doing well since last visit. Has a rash that he is set up to see Derm for in October. Denies dyspnea, cough, or chest pain.         Past Medical History:   Diagnosis Date    Asthma     CRI (chronic renal insufficiency) 2/21/2022    Disorder of kidney and ureter     History of bacterial pneumonia 1/23/2020    History of solitary pulmonary nodule 2/12/2021    Hyperlipidemia     Hypertension     Malignant neoplasm of right upper lobe of lung 12/13/2022    Obesity     Other closed fractures of distal end of radius (alone) 12/27/2013    Tobacco dependence        Past Surgical History:   Procedure Laterality Date    FINGER FRACTURE SURGERY      ROBOTIC BRONCHOSCOPY N/A 10/21/2022    Procedure: ROBOTIC BRONCHOSCOPY;  Surgeon: Jeremy Ghosh MD;  Location: [facility] OR [address] [place] [address];  Service: Pulmonary;  Laterality: N/A;       Social History     Tobacco Use    Smoking status: Every Day     Current packs/day: 1.00     Average packs/day: 1 pack/day for 55.0 years (55.0 ttl pk-yrs)     Types: Cigarettes    Smokeless  tobacco: Never   Substance Use Topics    Alcohol use: Not Currently     Comment: social    Drug use: Never       Cancer-related family history is not on file.    Current Outpatient Medications on File Prior to Visit   Medication Sig Dispense Refill    amLODIPine (NORVASC) 10 MG tablet Take 1 tablet (10 mg total) by mouth once daily. 90 tablet 3    atorvastatin (LIPITOR) 40 MG tablet Take 1 tablet (40 mg total) by mouth every evening. 90 tablet 1    VANESSA LOW DOSE ASPIRIN ORAL Take by mouth.      FLUAD QUAD 2022-23,65Y UP,,PF, 60 mcg (15 mcg x 4)/0.5 mL Syrg       ipratropium-albuteroL (COMBIVENT)  mcg/actuation inhaler Inhale 2 puffs into the lungs every 6 (six) hours as needed for Wheezing. Rescue 4 g 0    ipratropium-albuteroL (COMBIVENT)  mcg/actuation inhaler Inhale 2 puffs into the lungs every 6 (six) hours as needed for Wheezing. Rescue 4 g 0    losartan (COZAAR) 100 MG tablet Take 1 tablet (100 mg total) by mouth once daily. 90 tablet 3    MODERNA COVID BIVAL,6Y UP,,PF, 50 mcg/0.5 mL injection       pantoprazole (PROTONIX) 20 MG tablet Take 1 tablet (20 mg total) by mouth once daily. 90 tablet 1     No current facility-administered medications on file prior to visit.       Review of patient's allergies indicates:   Allergen Reactions    Codeine Itching    Wellbutrin [bupropion hcl] Rash                Vital Signs: /82   Pulse 91   Resp 20   Wt 99.9 kg (220 lb 3.8 oz)   SpO2 (!) 90%   BMI 35.55 kg/m²     ECOG Performance Status: 0 - Fully Active    Physical Exam  Constitutional:       Appearance: Normal appearance.   HENT:      Head: Normocephalic and atraumatic.   Eyes:      General: No scleral icterus.     Extraocular Movements: Extraocular movements intact.   Pulmonary:      Effort: No accessory muscle usage or respiratory distress.   Musculoskeletal:      Cervical back: Normal range of motion.   Neurological:      Mental Status: He is alert and oriented to person, place, and time.    Psychiatric:         Mood and Affect: Mood and affect normal.         Judgment: Judgment normal.          Labs:    Imaging: I have personally reviewed the patient's available images and reports and summarized pertinent findings above in HPI.     Pathology: No new path

## 2023-09-22 ENCOUNTER — HOSPITAL ENCOUNTER (OUTPATIENT)
Dept: RADIOLOGY | Facility: HOSPITAL | Age: 68
Discharge: HOME | End: 2023-09-22
Attending: RADIOLOGY
Payer: MEDICARE

## 2023-09-22 ENCOUNTER — OFFICE VISIT (OUTPATIENT)
Dept: RADIATION ONCOLOGY | Facility: CLINIC | Age: 68
End: 2023-09-22
Payer: MEDICARE

## 2023-09-22 VITALS
RESPIRATION RATE: 20 BRPM | BODY MASS INDEX: 35.55 KG/M2 | SYSTOLIC BLOOD PRESSURE: 136 MMHG | WEIGHT: 220.25 LBS | OXYGEN SATURATION: 90 % | DIASTOLIC BLOOD PRESSURE: 82 MMHG | HEART RATE: 91 BPM

## 2023-09-22 DIAGNOSIS — Z85.118 PERSONAL HISTORY OF LUNG CANCER: ICD-10-CM

## 2023-09-22 DIAGNOSIS — Z85.118 PERSONAL HISTORY OF LUNG CANCER: Primary | ICD-10-CM

## 2023-09-22 PROCEDURE — 4010F ACE/ARB THERAPY RXD/TAKEN: CPT | Mod: HCNC,CPTII,S$GLB, | Performed by: RADIOLOGY

## 2023-09-22 PROCEDURE — 1159F MED LIST DOCD IN RCRD: CPT | Mod: HCNC,CPTII,S$GLB, | Performed by: RADIOLOGY

## 2023-09-22 PROCEDURE — 71250 CT THORAX DX C-: CPT | Mod: TC,HCNC

## 2023-09-22 PROCEDURE — 4010F PR ACE/ARB THEARPY RXD/TAKEN: ICD-10-PCS | Mod: HCNC,CPTII,S$GLB, | Performed by: RADIOLOGY

## 2023-09-22 PROCEDURE — 99213 PR OFFICE/OUTPT VISIT, EST, LEVL III, 20-29 MIN: ICD-10-PCS | Mod: HCNC,S$GLB,, | Performed by: RADIOLOGY

## 2023-09-22 PROCEDURE — 3008F BODY MASS INDEX DOCD: CPT | Mod: HCNC,CPTII,S$GLB, | Performed by: RADIOLOGY

## 2023-09-22 PROCEDURE — 3288F PR FALLS RISK ASSESSMENT DOCUMENTED: ICD-10-PCS | Mod: HCNC,CPTII,S$GLB, | Performed by: RADIOLOGY

## 2023-09-22 PROCEDURE — 3008F PR BODY MASS INDEX (BMI) DOCUMENTED: ICD-10-PCS | Mod: HCNC,CPTII,S$GLB, | Performed by: RADIOLOGY

## 2023-09-22 PROCEDURE — 1126F PR PAIN SEVERITY QUANTIFIED, NO PAIN PRESENT: ICD-10-PCS | Mod: HCNC,CPTII,S$GLB, | Performed by: RADIOLOGY

## 2023-09-22 PROCEDURE — 99999 PR PBB SHADOW E&M-EST. PATIENT-LVL III: CPT | Mod: PBBFAC,HCNC,, | Performed by: RADIOLOGY

## 2023-09-22 PROCEDURE — 1159F PR MEDICATION LIST DOCUMENTED IN MEDICAL RECORD: ICD-10-PCS | Mod: HCNC,CPTII,S$GLB, | Performed by: RADIOLOGY

## 2023-09-22 PROCEDURE — 3079F DIAST BP 80-89 MM HG: CPT | Mod: HCNC,CPTII,S$GLB, | Performed by: RADIOLOGY

## 2023-09-22 PROCEDURE — 71250 CT CHEST WITHOUT CONTRAST: ICD-10-PCS | Mod: 26,HCNC,, | Performed by: RADIOLOGY

## 2023-09-22 PROCEDURE — 1101F PT FALLS ASSESS-DOCD LE1/YR: CPT | Mod: HCNC,CPTII,S$GLB, | Performed by: RADIOLOGY

## 2023-09-22 PROCEDURE — 99213 OFFICE O/P EST LOW 20 MIN: CPT | Mod: HCNC,S$GLB,, | Performed by: RADIOLOGY

## 2023-09-22 PROCEDURE — 1101F PR PT FALLS ASSESS DOC 0-1 FALLS W/OUT INJ PAST YR: ICD-10-PCS | Mod: HCNC,CPTII,S$GLB, | Performed by: RADIOLOGY

## 2023-09-22 PROCEDURE — 1126F AMNT PAIN NOTED NONE PRSNT: CPT | Mod: HCNC,CPTII,S$GLB, | Performed by: RADIOLOGY

## 2023-09-22 PROCEDURE — 3044F PR MOST RECENT HEMOGLOBIN A1C LEVEL <7.0%: ICD-10-PCS | Mod: HCNC,CPTII,S$GLB, | Performed by: RADIOLOGY

## 2023-09-22 PROCEDURE — 3075F SYST BP GE 130 - 139MM HG: CPT | Mod: HCNC,CPTII,S$GLB, | Performed by: RADIOLOGY

## 2023-09-22 PROCEDURE — 3075F PR MOST RECENT SYSTOLIC BLOOD PRESS GE 130-139MM HG: ICD-10-PCS | Mod: HCNC,CPTII,S$GLB, | Performed by: RADIOLOGY

## 2023-09-22 PROCEDURE — 3044F HG A1C LEVEL LT 7.0%: CPT | Mod: HCNC,CPTII,S$GLB, | Performed by: RADIOLOGY

## 2023-09-22 PROCEDURE — 99999 PR PBB SHADOW E&M-EST. PATIENT-LVL III: ICD-10-PCS | Mod: PBBFAC,HCNC,, | Performed by: RADIOLOGY

## 2023-09-22 PROCEDURE — 71250 CT THORAX DX C-: CPT | Mod: 26,HCNC,, | Performed by: RADIOLOGY

## 2023-09-22 PROCEDURE — 3079F PR MOST RECENT DIASTOLIC BLOOD PRESSURE 80-89 MM HG: ICD-10-PCS | Mod: HCNC,CPTII,S$GLB, | Performed by: RADIOLOGY

## 2023-09-22 PROCEDURE — 3288F FALL RISK ASSESSMENT DOCD: CPT | Mod: HCNC,CPTII,S$GLB, | Performed by: RADIOLOGY

## 2023-10-10 DIAGNOSIS — K21.9 GASTROESOPHAGEAL REFLUX DISEASE WITHOUT ESOPHAGITIS: ICD-10-CM

## 2023-10-11 RX ORDER — PANTOPRAZOLE SODIUM 20 MG/1
20 TABLET, DELAYED RELEASE ORAL
Qty: 90 TABLET | Refills: 1 | Status: SHIPPED | OUTPATIENT
Start: 2023-10-11

## 2023-10-18 ENCOUNTER — PATIENT MESSAGE (OUTPATIENT)
Dept: CARDIOLOGY | Facility: CLINIC | Age: 68
End: 2023-10-18
Payer: MEDICARE

## 2023-10-27 ENCOUNTER — OFFICE VISIT (OUTPATIENT)
Dept: DERMATOLOGY | Facility: CLINIC | Age: 68
End: 2023-10-27
Payer: MEDICARE

## 2023-10-27 DIAGNOSIS — R89.7 ABNORMALITY PRESENT ON GROSS PATHOLOGY: ICD-10-CM

## 2023-10-27 DIAGNOSIS — Z76.89 ENCOUNTER FOR SKIN CARE: ICD-10-CM

## 2023-10-27 DIAGNOSIS — L80 VITILIGO: ICD-10-CM

## 2023-10-27 DIAGNOSIS — L40.9 PSORIASIS: ICD-10-CM

## 2023-10-27 DIAGNOSIS — B07.9 VIRAL WARTS, UNSPECIFIED TYPE: Primary | ICD-10-CM

## 2023-10-27 DIAGNOSIS — R21 RASH AND NONSPECIFIC SKIN ERUPTION: ICD-10-CM

## 2023-10-27 PROCEDURE — 1159F MED LIST DOCD IN RCRD: CPT | Mod: HCNC,CPTII,S$GLB, | Performed by: DERMATOLOGY

## 2023-10-27 PROCEDURE — 4010F ACE/ARB THERAPY RXD/TAKEN: CPT | Mod: HCNC,CPTII,S$GLB, | Performed by: DERMATOLOGY

## 2023-10-27 PROCEDURE — 1126F AMNT PAIN NOTED NONE PRSNT: CPT | Mod: HCNC,CPTII,S$GLB, | Performed by: DERMATOLOGY

## 2023-10-27 PROCEDURE — 3044F HG A1C LEVEL LT 7.0%: CPT | Mod: HCNC,CPTII,S$GLB, | Performed by: DERMATOLOGY

## 2023-10-27 PROCEDURE — 1159F PR MEDICATION LIST DOCUMENTED IN MEDICAL RECORD: ICD-10-PCS | Mod: HCNC,CPTII,S$GLB, | Performed by: DERMATOLOGY

## 2023-10-27 PROCEDURE — 3288F PR FALLS RISK ASSESSMENT DOCUMENTED: ICD-10-PCS | Mod: HCNC,CPTII,S$GLB, | Performed by: DERMATOLOGY

## 2023-10-27 PROCEDURE — 99999 PR PBB SHADOW E&M-EST. PATIENT-LVL III: ICD-10-PCS | Mod: PBBFAC,HCNC,, | Performed by: DERMATOLOGY

## 2023-10-27 PROCEDURE — 17110 PR DESTRUCTION BENIGN LESIONS UP TO 14: ICD-10-PCS | Mod: HCNC,S$GLB,, | Performed by: DERMATOLOGY

## 2023-10-27 PROCEDURE — 3288F FALL RISK ASSESSMENT DOCD: CPT | Mod: HCNC,CPTII,S$GLB, | Performed by: DERMATOLOGY

## 2023-10-27 PROCEDURE — 1101F PR PT FALLS ASSESS DOC 0-1 FALLS W/OUT INJ PAST YR: ICD-10-PCS | Mod: HCNC,CPTII,S$GLB, | Performed by: DERMATOLOGY

## 2023-10-27 PROCEDURE — 99204 OFFICE O/P NEW MOD 45 MIN: CPT | Mod: 25,HCNC,S$GLB, | Performed by: DERMATOLOGY

## 2023-10-27 PROCEDURE — 99999 PR PBB SHADOW E&M-EST. PATIENT-LVL III: CPT | Mod: PBBFAC,HCNC,, | Performed by: DERMATOLOGY

## 2023-10-27 PROCEDURE — 1160F PR REVIEW ALL MEDS BY PRESCRIBER/CLIN PHARMACIST DOCUMENTED: ICD-10-PCS | Mod: HCNC,CPTII,S$GLB, | Performed by: DERMATOLOGY

## 2023-10-27 PROCEDURE — 17110 DESTRUCTION B9 LES UP TO 14: CPT | Mod: HCNC,S$GLB,, | Performed by: DERMATOLOGY

## 2023-10-27 PROCEDURE — 4010F PR ACE/ARB THEARPY RXD/TAKEN: ICD-10-PCS | Mod: HCNC,CPTII,S$GLB, | Performed by: DERMATOLOGY

## 2023-10-27 PROCEDURE — 1126F PR PAIN SEVERITY QUANTIFIED, NO PAIN PRESENT: ICD-10-PCS | Mod: HCNC,CPTII,S$GLB, | Performed by: DERMATOLOGY

## 2023-10-27 PROCEDURE — 3044F PR MOST RECENT HEMOGLOBIN A1C LEVEL <7.0%: ICD-10-PCS | Mod: HCNC,CPTII,S$GLB, | Performed by: DERMATOLOGY

## 2023-10-27 PROCEDURE — 1101F PT FALLS ASSESS-DOCD LE1/YR: CPT | Mod: HCNC,CPTII,S$GLB, | Performed by: DERMATOLOGY

## 2023-10-27 PROCEDURE — 1160F RVW MEDS BY RX/DR IN RCRD: CPT | Mod: HCNC,CPTII,S$GLB, | Performed by: DERMATOLOGY

## 2023-10-27 PROCEDURE — 99204 PR OFFICE/OUTPT VISIT, NEW, LEVL IV, 45-59 MIN: ICD-10-PCS | Mod: 25,HCNC,S$GLB, | Performed by: DERMATOLOGY

## 2023-10-27 RX ORDER — CLOBETASOL PROPIONATE 0.5 MG/G
CREAM TOPICAL 2 TIMES DAILY
Qty: 60 G | Refills: 1 | Status: SHIPPED | OUTPATIENT
Start: 2023-10-27

## 2023-10-27 NOTE — PATIENT INSTRUCTIONS
Discussed all of the following:  Psoriasis is an inflammatory condition that affects the skin and nails. You may have patches of thick, red skin (plaques) covered with silvery scales. These often appear on the elbows, knees, legs, lower back, and scalp.  The plaques itch and can be painful. People with this condition are more likely to have emotional stress and depression.  Psoriasis is not contagious. It cant spread to someone else who touches it. But it can be inherited. It is an autoimmune skin disease. This means that the immune system has an abnormal reaction. It treats healthy skin like it is a foreign substance. This causes skin cells to grow faster than normal and to stack up in raised red patches. Psoriasis is a long-term (chronic) disease. You will have flare-ups that come and go over time.  Psoriasis is made worse by smoking, alcohol, stress, and some blood pressure medications.     Good skin care regimen discussed including limiting to one bath or shower per day, using lukewarm water with mild soap and moisturization to skin once to twice daily.  Consider glycerin bar soap or Dove.  Consider organic coconut oil.  CRYOSURGERY      Your doctor has used a method called cryosurgery to treat your skin condition. Cryosurgery refers to the use of very cold substances to treat a variety of skin conditions such as warts, pre-skin cancers, molluscum contagiosum, sun spots, and several benign growths. The substance we use in cryosurgery is liquid nitrogen and is so cold (-195 degrees Celsius) that is burns when administered.     Following treatment in the office, the skin may immediately burn and become red. You may find the area around the lesion is affected as well. It is sometimes necessary to treat not only the lesion, but a small area of the surrounding normal skin to achieve a good response.     A blister, and even a blood filled blister, may form after treatment.   This is a normal response. If the blister  is painful, it is acceptable to sterilize a needle and with rubbing alcohol and gently pop the blister. It is important that you gently wash the area with soap and warm water as the blister fluid may contain wart virus if a wart was treated. Do no remove the roof of the blister.     The area treated can take anywhere from 1-3 weeks to heal. Healing time depends on the kind skin lesion treated, the location, and how aggressively the lesion was treated. It is recommended that the areas treated are covered with Vaseline or bacitracin ointment and a band-aid. If a band-aid is not practical, just ointment applied several times per day will do. Keeping these areas moist will speed the healing time.    Treatment with liquid nitrogen can leave a scar. In dark skin, it may be a light or dark scar, in light skin it may be a white or pink scar. These will generally fade with time, but may never go away completely.     If you have any concerns after your treatment, please feel free to call the office.       [facility] [place] [address]/ [phone] FAX/ [email or web address]

## 2023-10-27 NOTE — PROGRESS NOTES
Subjective:      Patient ID:  Gurwinder Hair is a 68 y.o. male who presents for   Chief Complaint   Patient presents with    hypopigmentation     All over    Skin Tags     All over     Skin Discoloration - Initial  Affected locations: diffuse  Duration: 4 months  Signs / symptoms: asymptomatic  Severity: mild to moderate  Timing: constant  Aggravated by: nothing  Relieving factors/Treatments tried: nothing  Improvement on treatment: no relief    Skin Tags - Initial  Affected locations: diffuse  Duration: 5 years  Signs / symptoms: asymptomatic  Severity: mild to moderate  Timing: constant  Aggravated by: nothing  Relieving factors/Treatments tried: nothing  Improvement on treatment: no relief      Review of Systems   Constitutional:  Negative for fever and chills.   HENT:  Negative for congestion and sore throat.    Respiratory:  Negative for cough and shortness of breath.    Gastrointestinal:  Negative for nausea and vomiting.   Skin:  Positive for dry skin. Negative for daily sunscreen use, activity-related sunscreen use, recent sunburn and wears hat.   Psychiatric/Behavioral:  Positive for high stress.    Hematologic/Lymphatic: Does not bruise/bleed easily.       Objective:   Physical Exam   Constitutional: He appears well-developed and well-nourished. No distress.   Neurological: He is alert and oriented to person, place, and time. He is not disoriented.   Psychiatric: He has a normal mood and affect.   Skin:                    Diagram Legend     Erythematous scaling macule/papule c/w actinic keratosis       Vascular papule c/w angioma      Pigmented verrucoid papule/plaque c/w seborrheic keratosis      Yellow umbilicated papule c/w sebaceous hyperplasia      Irregularly shaped tan macule c/w lentigo     1-2 mm smooth white papules consistent with Milia      Movable subcutaneous cyst with punctum c/w epidermal inclusion cyst      Subcutaneous movable cyst c/w pilar cyst      Firm pink to brown papule c/w  dermatofibroma      Pedunculated fleshy papule(s) c/w skin tag(s)      Evenly pigmented macule c/w junctional nevus     Mildly variegated pigmented, slightly irregular-bordered macule c/w mildly atypical nevus      Flesh colored to evenly pigmented papule c/w intradermal nevus       Pink pearly papule/plaque c/w basal cell carcinoma      Erythematous hyperkeratotic cursted plaque c/w SCC      Surgical scar with no sign of skin cancer recurrence      Open and closed comedones      Inflammatory papules and pustules      Verrucoid papule consistent consistent with wart     Erythematous eczematous patches and plaques     Dystrophic onycholytic nail with subungual debris c/w onychomycosis     Umbilicated papule    Erythematous-base heme-crusted tan verrucoid plaque consistent with inflamed seborrheic keratosis     Erythematous Silvery Scaling Plaque c/w Psoriasis     See annotation  L shin with white patches.  Arms and trunk with light patches.  Few dry.    Assessment / Plan:        Viral warts, unspecified type  Discussed with patient the etiology and pathogenesis of the disease or skin lesion(s) and possible treatments and aggravators.    Reviewed with patient different treatment options and associated risks.  Cryosurgery procedure note:    Verbal consent from the patient is obtained including, but not limited to, risk of hypopigmentation/hyperpigmentation, scar, recurrence of lesion. Liquid nitrogen cryosurgery is applied to 2 verruca with prior paring, as detailed in the physical exam, to produce a freeze injury. 3 consecutive freeze thaw cycles are applied to each verruca. The patient is aware that blisters (possibly blood blisters) may form.    Abnormality present on gross pathology  -     Ambulatory referral/consult to Dermatology  Previous [facility] labs and or records and notes reviewed and considered for their impact on our clinical decision making today.    Rash and nonspecific skin eruption  -     Ambulatory  referral/consult to Dermatology  Previous [facility] labs and or records and notes reviewed and considered for their impact on our clinical decision making today.    Psoriasis  Discussed with patient the etiology and pathogenesis of the disease or skin lesion(s) and possible treatments and aggravators.    Brochure given for patient education.  Discussed all of the following:  Psoriasis is an inflammatory condition that affects the skin and nails. You may have patches of thick, red skin (plaques) covered with silvery scales. These often appear on the elbows, knees, legs, lower back, and scalp.  The plaques itch and can be painful. People with this condition are more likely to have emotional stress and depression.  Psoriasis is not contagious. It cant spread to someone else who touches it. But it can be inherited. It is an autoimmune skin disease. This means that the immune system has an abnormal reaction. It treats healthy skin like it is a foreign substance. This causes skin cells to grow faster than normal and to stack up in raised red patches. Psoriasis is a long-term (chronic) disease. You will have flare-ups that come and go over time.  Psoriasis is made worse by smoking, alcohol, stress, and some blood pressure medications.  Reviewed with patient different treatment options and associated risks.  Chronic nature of this condition discussed with patient.  Proper application of medications and or care for affected area(s) and condition(s) reviewed.   clobetasoL (TEMOVATE) 0.05 % cream               Apply topically 2 (two) times daily. Prn focal psoriasis.Stop using steroid topical when skin is smooth and non itchy. Do not treat dark or red coloring., Starting Fri 10/27/2023, Normal     Encounter for skin care  Good skin care regimen discussed including limiting to one bath or shower per day, using lukewarm water with mild soap and moisturization to skin once to twice daily.  Consider glycerin bar soap or Dove.   Consider organic coconut oil.  Instructed patient to use petroleum jelly at least daily on affected areas.    Vitiligo  Pt reports old on l lower leg.    Skin tags  Discussed with patient the benign nature of these lesions and that no treatment is indicated.  Chronic nature of this condition discussed with patient.           Follow up in about 6 months (around 4/27/2024).

## 2023-11-02 ENCOUNTER — CLINICAL SUPPORT (OUTPATIENT)
Dept: SMOKING CESSATION | Facility: CLINIC | Age: 68
End: 2023-11-02
Payer: COMMERCIAL

## 2023-11-02 DIAGNOSIS — F17.200 NICOTINE DEPENDENCE: Primary | ICD-10-CM

## 2023-11-02 PROCEDURE — 99404 PREV MED CNSL INDIV APPRX 60: CPT | Mod: S$GLB,,,

## 2023-11-02 PROCEDURE — 99404 PR PREVENT COUNSEL,INDIV,60 MIN: ICD-10-PCS | Mod: S$GLB,,,

## 2023-11-02 NOTE — Clinical Note
Patient was seen in clinic today for intake. Patient states that he smokes about 1 ppd.  Patient's CO monitor was  13  ppm.  Patient states that he is not mentally ready to quit.  Patient gi an follow up appointment.  Informed patient about future help or support if needed.  Contact information was given.

## 2023-12-12 ENCOUNTER — PATIENT MESSAGE (OUTPATIENT)
Dept: ADMINISTRATIVE | Facility: HOSPITAL | Age: 68
End: 2023-12-12
Payer: MEDICARE

## 2023-12-13 ENCOUNTER — PATIENT OUTREACH (OUTPATIENT)
Dept: ADMINISTRATIVE | Facility: HOSPITAL | Age: 68
End: 2023-12-13
Payer: MEDICARE

## 2023-12-13 DIAGNOSIS — Z12.11 ENCOUNTER FOR COLORECTAL CANCER SCREENING: Primary | ICD-10-CM

## 2023-12-13 DIAGNOSIS — Z12.12 ENCOUNTER FOR COLORECTAL CANCER SCREENING: Primary | ICD-10-CM

## 2023-12-20 ENCOUNTER — TELEPHONE (OUTPATIENT)
Dept: ENDOSCOPY | Facility: HOSPITAL | Age: 68
End: 2023-12-20

## 2023-12-20 ENCOUNTER — CLINICAL SUPPORT (OUTPATIENT)
Dept: ENDOSCOPY | Facility: HOSPITAL | Age: 68
End: 2023-12-20
Attending: STUDENT IN AN ORGANIZED HEALTH CARE EDUCATION/TRAINING PROGRAM
Payer: MEDICARE

## 2023-12-20 VITALS — BODY MASS INDEX: 33.27 KG/M2 | HEIGHT: 67 IN | WEIGHT: 212 LBS

## 2023-12-20 DIAGNOSIS — Z12.12 ENCOUNTER FOR COLORECTAL CANCER SCREENING: ICD-10-CM

## 2023-12-20 DIAGNOSIS — Z12.11 ENCOUNTER FOR COLORECTAL CANCER SCREENING: ICD-10-CM

## 2023-12-20 RX ORDER — POLYETHYLENE GLYCOL 3350, SODIUM SULFATE ANHYDROUS, SODIUM BICARBONATE, SODIUM CHLORIDE, POTASSIUM CHLORIDE 236; 22.74; 6.74; 5.86; 2.97 G/4L; G/4L; G/4L; G/4L; G/4L
4 POWDER, FOR SOLUTION ORAL ONCE
Qty: 4000 ML | Refills: 0 | Status: SHIPPED | OUTPATIENT
Start: 2023-12-20 | End: 2023-12-20

## 2023-12-20 NOTE — PLAN OF CARE
Spoke to patient to schedule procedure(s) Colonoscopy       Physician to perform procedure(s) Dr. VANCE Zepeda  Date of Procedure (s) 4/18/24  Arrival Time 8:00 AM  Time of Procedure(s) 9:00 AM   Location of Procedure(s) [facility] 4th Floor  Type of Rx Prep sent to patient: PEG  Instructions provided to patient via [facility]    Patient was informed on the following information and verbalized understanding. Screening questionnaire reviewed with patient and complete. If procedure requires anesthesia, a responsible adult needs to be present to accompany the patient home, patient cannot drive after receiving anesthesia. Appointment details are tentative, especially check-in time. Patient will receive a prep-op call 7 days prior to confirm check-in time for procedure. If applicable the patient should contact their pharmacy to verify Rx for procedure prep is ready for pick-up. Patient was advised to call the scheduling department at [phone] if pharmacy states no Rx is available. Patient was advised to call the endoscopy scheduling department if any questions or concerns arise.      [facility]oscopy Scheduling Department

## 2023-12-20 NOTE — TELEPHONE ENCOUNTER
Spoke to patient to schedule procedure(s) Colonoscopy       Physician to perform procedure(s) Dr. VANCE Zepeda  Date of Procedure (s) 4/18/24  Arrival Time 8:00 AM  Time of Procedure(s) 9:00 AM   Location of Procedure(s) [address]  Type of Rx Prep sent to patient: PEG  Instructions provided to patient via [facility]    Patient was informed on the following information and verbalized understanding. Screening questionnaire reviewed with patient and complete. If procedure requires anesthesia, a responsible adult needs to be present to accompany the patient home, patient cannot drive after receiving anesthesia. Appointment details are tentative, especially check-in time. Patient will receive a prep-op call 7 days prior to confirm check-in time for procedure. If applicable the patient should contact their pharmacy to verify Rx for procedure prep is ready for pick-up. Patient was advised to call the scheduling department at [phone] if pharmacy states no Rx is available. Patient was advised to call the endoscopy scheduling department if any questions or concerns arise.       Endoscopy Scheduling Department         Colonoscopy Procedure Prep Instructions     Date of procedure: 4/18/24 Arrive at: 8:00 AM     Location of Department:   [facility] [address] [place] [address]  Take the [facility] Elevators to 4th Floor Endoscopy Lab     As soon as possible:   your prep from pharmacy and over the counter DULCOLAX LAXATIVE TABLETS             On the day before your procedure   What You CAN do:   You may have clear liquids ONLY-see below for list.      Liquids That Are OK to Drink:   Water  Sports drinks (Gatorade, Power-Aid)  Coffee or tea (no cream or nondairy creamer)  Clear juices without pulp (apple, white grape)  Gelatin desserts (no fruit or toppings)  Clear soda (sprite, coke, ginger ale)  Chicken broth (until 12 midnight the night before procedure)     What You CANNOT do:   Do  not EAT solid food, drink milk or anything   colored red.  Do not drink alcohol.  Do not take oral medications within 1 hour of starting   each dose of prep.  No gum chewing or candy morning of procedure                       Note:   (Please disregard the insert instructions from pharmacy).  PEG Bowel Prep is indicated for cleansing of the colon as a preparation for colonoscopy in adults.   Be sure to tell your doctor about all the medicines you take, including prescription and non-prescription medicines, vitamins, and herbal supplements. PEG Bowel Prep may affect how other medicines work.  Medication taken by mouth may not be absorbed properly when taken within 1 hour before the start of each dose of PEG Bowel Prep.     It is not uncommon to experience some abdominal cramping, nausea and/or vomiting when taking the prep. If you have nausea and/or vomiting while taking the prep, stop drinking for 20 to 30 minutes then continue.        How to take prep:     PEG Bowel Prep is a (2-day) prep.    One (1) bottle of prep are required for a complete preparation for colonoscopy. Dilute the solution concentrate as directed prior to use. You must drink water with each dose of prep, and additional water after each dose.     DOSE 1--Day Before Colonoscopy 4/17/24      Drink at least 6 to 8 glasses of clear liquids from time you wake up until you begin your prep and then continue until bedtime to avoid dehydration.      12:00 pm (NOON) Mix your entire container of prep with lukewarm water and refrigerate. Take four (4) Dulcolax (Bisacodyl) tablets with at least 8 ounces or more of clear liquids.        6:00 pm:     You must complete Steps 1 and 2 below before going to bed:     Step 1-Drink half the liquid in the container within one (1) hour.   Step 2-Refrigerate the remaining half of the liquid for dose 2. See below when to begin this step.                       IMPORTANT: If you experience preparation-related symptoms (for  example, nausea, bloating, or cramping), stop, or slow the rate of drinking the additional water until your symptoms decrease.     DOSE 2--Day of the Colonoscopy 4/18/24 at 2-3 AM.     For this dose, repeat Step 1 shown above using the remaining half of the liquid prep.   You may continue drinking water/clear liquids until   4 hours before your colonoscopy or as directed by the scheduling nurse  5:00 AM.     For more information about your procedure, please watch this informational video. It is important to watch this animated consent video prior to your arrival.   If you haven't watched the video prior to arriving, you are required to watch it during admission which can cause delays.      Options for viewing:  Using a keyboard:  press and hold the control tab (Ctrl) and left mouse click to follow link          Colonoscopy Instructional Video                                                        OR     Type link address into your web browser's address bar:  [email or web address]     Using a mobile phone: tap on web address/link.              IMPORTANT INFORMATION TO KNOW BEFORE YOUR PROCEDURE     [facility] [place] 4th Floor     If your procedure requires the administration of anesthesia, it is necessary for a responsible adult to drive you home. (Medical Transportation, Uber, Lyft, Taxi, etc. may ONLY be used if a responsible adult is present to accompany you home.  The responsible adult CAN'T be the  of the service).       person must be available to return to pick you up within 15 minutes of being notified of discharge.      Due to the limited socially distant seating in our waiting room, please limit your guest (1) who accompany you for this procedure. If someone accompanies you for this procedure into the facility:     Consider having them proceed to an area that is socially distant other than the lobby until a member of the medical team contacts them to  provide an update after the procedure.      Also, please consider being dropped off and picked up from the facility.        Please bring a picture ID, insurance card, & copayment     Take Medications as directed below:           If you begin taking any blood thinning medications or injectable weight loss/diabetes medications (other than insulin) , please contact the endoscopy scheduling department listed below as soon as possible     If you are diabetic see the attached instruction sheet regarding your medication.      If you take HEART, BLOOD PRESSURE, SEIZURE, PAIN, LUNG (including inhalers/nebulizers), ANTI-REJECTION (transplant patients), or PSYCHIATRIC medications, please take at your regular times with a sip of water or as directed by the scheduling nurse.      Important contact information:     Endoscopy [phone] Hours of operation Monday-Friday 8:00-4:30pm.     Questions about insurance or financial obligations call [phone] or [phone].     If you have questions regarding the prep or need to reschedule, please call [phone]. After hours questions requiring immediate assistance, contact [facility] nurse line at [phone] or [phone].   NOTE:      On occasion, unforeseen circumstances may cause a delay in your procedure start time. We respect your time and appreciate your patience during these circumstances.

## 2024-01-26 ENCOUNTER — TELEPHONE (OUTPATIENT)
Dept: PRIMARY CARE CLINIC | Facility: CLINIC | Age: 69
End: 2024-01-26
Payer: COMMERCIAL

## 2024-01-30 ENCOUNTER — CLINICAL SUPPORT (OUTPATIENT)
Dept: SMOKING CESSATION | Facility: CLINIC | Age: 69
End: 2024-01-30
Payer: COMMERCIAL

## 2024-01-30 DIAGNOSIS — F17.200 NICOTINE DEPENDENCE: Primary | ICD-10-CM

## 2024-01-30 PROCEDURE — 99999 PR PBB SHADOW E&M-EST. PATIENT-LVL I: CPT | Mod: PBBFAC,,,

## 2024-01-30 PROCEDURE — 99407 BEHAV CHNG SMOKING > 10 MIN: CPT | Mod: S$GLB,,,

## 2024-01-30 NOTE — PROGRESS NOTES
Spoke with patient today in regard to smoking cessation progress for 3 month telephone follow up, he states not tobacco free. Patient states this is not a good time to quit and will call when ready. Informed patient of benefit period, future follow ups, and contact information if any further help or support is needed. Will resolve episode and complete smart form for Quit attempt #2 and complete 3 month follow up on Quit #3.

## 2024-02-15 DIAGNOSIS — I10 ESSENTIAL HYPERTENSION: Chronic | ICD-10-CM

## 2024-02-15 NOTE — TELEPHONE ENCOUNTER
No care due was identified.  [facility] Embedded Care Due Messages. Reference number: [id number].   2/15/2024 10:05:29 AM CST

## 2024-02-16 RX ORDER — LOSARTAN POTASSIUM 100 MG/1
100 TABLET ORAL
Qty: 90 TABLET | Refills: 1 | Status: SHIPPED | OUTPATIENT
Start: 2024-02-16 | End: 2024-05-10 | Stop reason: SDUPTHER

## 2024-02-16 NOTE — TELEPHONE ENCOUNTER
Refill Decision Note   Gurwinder Hair  is requesting a refill authorization.  Brief Assessment and Rationale for Refill:  Approve     Medication Therapy Plan:         Comments:     Note composed:3:10 AM 02/16/2024

## 2024-03-06 ENCOUNTER — TELEPHONE (OUTPATIENT)
Dept: RADIATION THERAPY | Facility: HOSPITAL | Age: 69
End: 2024-03-06
Payer: COMMERCIAL

## 2024-03-06 ENCOUNTER — TELEPHONE (OUTPATIENT)
Dept: PRIMARY CARE CLINIC | Facility: CLINIC | Age: 69
End: 2024-03-06

## 2024-03-06 NOTE — TELEPHONE ENCOUNTER
----- Message from Cherri Guillermo sent at 3/6/2024  9:14 AM CST -----  Type: Patient Call Back    Who called:Sabrina Collins    What is the request in detail:Verify heart condition     Can the clinic reply by [facility]?No    Would the patient rather a call back or a response via [facility]? Call    Best call back number:[phone] ext [phone]    Additional Information:

## 2024-03-06 NOTE — TELEPHONE ENCOUNTER
Spoke to Karina Bennett @ [phone] ext [phone] at [employer] who has questions regarding patient medical history. Informed she needed to contact Gurwinder Hair's PCP Amelia Ramirez, states had tried but has not had a response

## 2024-03-06 NOTE — TELEPHONE ENCOUNTER
Spoke with rep. She stated she  will fax over a letter to confirm  problem list.  Fax number provided.

## 2024-03-06 NOTE — TELEPHONE ENCOUNTER
----- Message from Aisha Lora sent at 3/6/2024  2:38 PM CST -----  Contact: Sabrina Collins/[employer]  [phone]  Patient is returning a phone call.  Who left a message for the patient: Joan Willson  Does patient know what this is regarding:  yes  Would you like a call back, or a response through your [facility] portal?:   Call back  Comments:     Please call and advise.    Thank You

## 2024-04-03 ENCOUNTER — PATIENT MESSAGE (OUTPATIENT)
Dept: ADMINISTRATIVE | Facility: HOSPITAL | Age: 69
End: 2024-04-03
Payer: MEDICARE

## 2024-04-12 DIAGNOSIS — I10 ESSENTIAL HYPERTENSION: ICD-10-CM

## 2024-04-12 NOTE — TELEPHONE ENCOUNTER
Care Due:                  Date            Visit Type   Department     Provider  --------------------------------------------------------------------------------                                EP -                              PRIMARY      [facility]  Last Visit: 07-      CARE ([facility])   SANTOS Acharya  Next Visit: None Scheduled  None         None Found                                                            Last  Test          Frequency    Reason                     Performed    Due Date  --------------------------------------------------------------------------------    CMP.........  12 months..  atorvastatin, losartan...  07- 07-    Lipid Panel.  12 months..  atorvastatin.............  07- 07-    [employer] Embedded Care Due Messages. Reference number: [id number].   4/12/2024 1:45:19 PM CDT

## 2024-04-13 RX ORDER — AMLODIPINE BESYLATE 10 MG/1
10 TABLET ORAL
Qty: 90 TABLET | Refills: 0 | Status: SHIPPED | OUTPATIENT
Start: 2024-04-13 | End: 2024-05-10 | Stop reason: SDUPTHER

## 2024-04-14 NOTE — TELEPHONE ENCOUNTER
Provider Staff:  Action required for this patient    Requires labs      Please see care gap opportunities below in Care Due Message.    Thanks!  [facility]     Appointments      Date Provider   Last Visit   7/11/2023 Mercedes Acharya MD   Next Visit   Visit date not found Mercedes Acharya MD     Refill Decision Note   Gurwinder Hair  is requesting a refill authorization.  Brief Assessment and Rationale for Refill:  Approve     Medication Therapy Plan:         Comments:     Note composed:8:46 PM 04/13/2024

## 2024-04-15 ENCOUNTER — TELEPHONE (OUTPATIENT)
Dept: ENDOSCOPY | Facility: HOSPITAL | Age: 69
End: 2024-04-15
Payer: MEDICARE

## 2024-04-15 DIAGNOSIS — Z12.11 COLON CANCER SCREENING: Primary | ICD-10-CM

## 2024-04-15 RX ORDER — POLYETHYLENE GLYCOL 3350, SODIUM SULFATE ANHYDROUS, SODIUM BICARBONATE, SODIUM CHLORIDE, POTASSIUM CHLORIDE 236; 22.74; 6.74; 5.86; 2.97 G/4L; G/4L; G/4L; G/4L; G/4L
4 POWDER, FOR SOLUTION ORAL ONCE
Qty: 4000 ML | Refills: 0 | Status: SHIPPED | OUTPATIENT
Start: 2024-04-15 | End: 2024-04-15

## 2024-04-18 ENCOUNTER — ANESTHESIA EVENT (OUTPATIENT)
Dept: ENDOSCOPY | Facility: HOSPITAL | Age: 69
End: 2024-04-18
Payer: MEDICARE

## 2024-04-18 ENCOUNTER — HOSPITAL ENCOUNTER (OUTPATIENT)
Facility: HOSPITAL | Age: 69
Discharge: HOME | End: 2024-04-18
Attending: INTERNAL MEDICINE | Admitting: INTERNAL MEDICINE
Payer: MEDICARE

## 2024-04-18 ENCOUNTER — ANESTHESIA (OUTPATIENT)
Dept: ENDOSCOPY | Facility: HOSPITAL | Age: 69
End: 2024-04-18
Payer: MEDICARE

## 2024-04-18 VITALS
WEIGHT: 203 LBS | HEART RATE: 99 BPM | HEIGHT: 67 IN | OXYGEN SATURATION: 95 % | SYSTOLIC BLOOD PRESSURE: 140 MMHG | TEMPERATURE: 98 F | BODY MASS INDEX: 31.86 KG/M2 | RESPIRATION RATE: 16 BRPM | DIASTOLIC BLOOD PRESSURE: 80 MMHG

## 2024-04-18 DIAGNOSIS — Z12.11 ENCOUNTER FOR SCREENING COLONOSCOPY: ICD-10-CM

## 2024-04-18 PROCEDURE — 37000008 HC ANESTHESIA 1ST 15 MINUTES: Mod: HCNC | Performed by: INTERNAL MEDICINE

## 2024-04-18 PROCEDURE — 45385 COLONOSCOPY W/LESION REMOVAL: CPT | Mod: PT,HCNC | Performed by: INTERNAL MEDICINE

## 2024-04-18 PROCEDURE — 88305 TISSUE EXAM BY PATHOLOGIST: CPT | Mod: 26,HCNC,, | Performed by: PATHOLOGY

## 2024-04-18 PROCEDURE — 25000003 PHARM REV CODE 250: Mod: HCNC | Performed by: NURSE ANESTHETIST, CERTIFIED REGISTERED

## 2024-04-18 PROCEDURE — 37000009 HC ANESTHESIA EA ADD 15 MINS: Mod: HCNC | Performed by: INTERNAL MEDICINE

## 2024-04-18 PROCEDURE — 45385 COLONOSCOPY W/LESION REMOVAL: CPT | Mod: PT,HCNC,, | Performed by: INTERNAL MEDICINE

## 2024-04-18 PROCEDURE — 88305 TISSUE EXAM BY PATHOLOGIST: CPT | Mod: HCNC | Performed by: PATHOLOGY

## 2024-04-18 PROCEDURE — 27201089 HC SNARE, DISP (ANY): Mod: HCNC | Performed by: INTERNAL MEDICINE

## 2024-04-18 PROCEDURE — 63600175 PHARM REV CODE 636 W HCPCS: Mod: HCNC | Performed by: NURSE ANESTHETIST, CERTIFIED REGISTERED

## 2024-04-18 PROCEDURE — E9220 PRA ENDO ANESTHESIA: HCPCS | Mod: PT,,, | Performed by: NURSE ANESTHETIST, CERTIFIED REGISTERED

## 2024-04-18 RX ORDER — PROPOFOL 10 MG/ML
VIAL (ML) INTRAVENOUS
Status: DISCONTINUED | OUTPATIENT
Start: 2024-04-18 | End: 2024-04-19

## 2024-04-18 RX ORDER — SODIUM CHLORIDE 9 MG/ML
INJECTION, SOLUTION INTRAVENOUS CONTINUOUS
Status: DISCONTINUED | OUTPATIENT
Start: 2024-04-18 | End: 2024-04-18 | Stop reason: HOSPADM

## 2024-04-18 RX ORDER — LIDOCAINE HYDROCHLORIDE 20 MG/ML
INJECTION INTRAVENOUS
Status: DISCONTINUED | OUTPATIENT
Start: 2024-04-18 | End: 2024-04-19

## 2024-04-18 RX ADMIN — SODIUM CHLORIDE: 0.9 INJECTION, SOLUTION INTRAVENOUS at 08:04

## 2024-04-18 RX ADMIN — PROPOFOL 125 MCG/KG/MIN: 10 INJECTION, EMULSION INTRAVENOUS at 08:04

## 2024-04-18 RX ADMIN — PROPOFOL 80 MG: 10 INJECTION, EMULSION INTRAVENOUS at 08:04

## 2024-04-18 RX ADMIN — LIDOCAINE HYDROCHLORIDE 50 MG: 20 INJECTION INTRAVENOUS at 08:04

## 2024-04-18 RX ADMIN — GLYCOPYRROLATE 0.2 MG: 0.2 INJECTION, SOLUTION INTRAMUSCULAR; INTRAVENOUS at 08:04

## 2024-04-18 NOTE — TRANSFER OF CARE
"Anesthesia Transfer of Care Note    Patient: Gurwinder Hair    Procedure(s) Performed: Procedure(s) (LRB):  COLONOSCOPY (N/A)    Patient location: PACU    Anesthesia Type: general    Transport from OR: Transported from OR on 6-10 L/min O2 by face mask with adequate spontaneous ventilation    Post pain: adequate analgesia    Post assessment: no apparent anesthetic complications and tolerated procedure well    Post vital signs: stable    Level of consciousness: sedated    Nausea/Vomiting: no nausea/vomiting    Complications: none    Transfer of care protocol was followed      Last vitals: Visit Vitals  BP (!) 142/93   Pulse 87   Temp 36.7 °C (98.1 °F) (Temporal)   Resp 18   Ht 5' 7" (1.702 m)   Wt 92.1 kg (203 lb)   SpO2 (!) 93%   BMI 31.79 kg/m²     "

## 2024-04-18 NOTE — H&P
Short Stay Endoscopy History and Physical    PCP - Mercedes Acharya MD  Referring Physician - PRE-ADMIT NURSE 2, ENDO -[facility]  No address on file    Procedure - colonoscopy  ASA - per anesthesia  Mallampati - per anesthesia  History of Anesthesia problems - per anesthesia  Family history Anesthesia problems -  per anesthesia   Plan of anesthesia - per anesthesia    HPI:  This is a 69 y.o. male here for evaluation of: screening colonoscopy; previously negative Cologuard 2021; negative colonoscopy 2011 or 2012 per patient report.    Reflux - no  Dysphagia - no  Abdominal pain - no  Diarrhea - no    ROS:  Constitutional: No fevers, chills, No weight loss  CV: No chest pain  Pulm: No cough, No shortness of breath  Ophtho: No vision changes  GI: see HPI  Derm: No rash    Medical History:  has a past medical history of Asthma, CRI (chronic renal insufficiency) (2/21/2022), Disorder of kidney and ureter, History of bacterial pneumonia (1/23/2020), History of solitary pulmonary nodule (2/12/2021), Hyperlipidemia, Hypertension, Malignant neoplasm of right upper lobe of lung (12/13/2022), Obesity, Other closed fractures of distal end of radius (alone) (12/27/2013), and Tobacco dependence.    Surgical History:  has a past surgical history that includes Finger fracture surgery and robotic bronchoscopy (N/A, 10/21/2022).    Family History: family history includes No Known Problems in his father and mother..    Social History:  reports that he has been smoking cigarettes. He started smoking about 57 years ago. He has a 52 pack-year smoking history. He has never used smokeless tobacco. He reports that he does not currently use alcohol. He reports that he does not use drugs.    Review of patient's allergies indicates:   Allergen Reactions    Codeine Itching    Wellbutrin [bupropion hcl] Rash              Medications:   Medications Prior to Admission   Medication Sig Dispense Refill Last Dose    amLODIPine (NORVASC)  10 MG tablet TAKE 1 TABLET(10 MG) BY MOUTH EVERY DAY 90 tablet 0 4/18/2024    atorvastatin (LIPITOR) 40 MG tablet TAKE 1 TABLET(40 MG) BY MOUTH EVERY EVENING 90 tablet 2 4/18/2024    VANESSA LOW DOSE ASPIRIN ORAL Take by mouth.   4/18/2024    losartan (COZAAR) 100 MG tablet TAKE 1 TABLET(100 MG) BY MOUTH EVERY DAY 90 tablet 1 4/18/2024    pantoprazole (PROTONIX) 20 MG tablet TAKE 1 TABLET(20 MG) BY MOUTH EVERY DAY 90 tablet 1 4/18/2024    clobetasoL (TEMOVATE) 0.05 % cream Apply topically 2 (two) times daily. Prn focal psoriasis.Stop using steroid topical when skin is smooth and non itchy.  Do not treat dark or red coloring. 60 g 1     FLUAD QUAD 2022-23,65Y UP,,PF, 60 mcg (15 mcg x 4)/0.5 mL Syrg        ipratropium-albuteroL (COMBIVENT)  mcg/actuation inhaler Inhale 2 puffs into the lungs every 6 (six) hours as needed for Wheezing. Rescue 4 g 0     ipratropium-albuteroL (COMBIVENT)  mcg/actuation inhaler Inhale 2 puffs into the lungs every 6 (six) hours as needed for Wheezing. Rescue 4 g 0 More than a month    MODERNA COVID BIVAL,6Y UP,,PF, 50 mcg/0.5 mL injection           Physical Exam:    Vital Signs:   Vitals:    04/18/24 0812   BP: (!) 142/93   Pulse:    Resp:    Temp:        General Appearance: Well appearing in no acute distress    Labs:  Lab Results   Component Value Date    WBC 6.47 11/17/2022    HGB 16.5 11/17/2022    HCT 51.2 11/17/2022     11/17/2022    CHOL 166 07/11/2023    TRIG 145 07/11/2023    HDL 34 (L) 07/11/2023    ALT 27 07/11/2023    AST 25 07/11/2023     07/11/2023    K 3.5 07/11/2023     07/11/2023    CREATININE 1.2 07/11/2023    BUN 14 07/11/2023    CO2 25 07/11/2023    TSH 1.553 05/05/2023    PSA 0.51 09/12/2023    HGBA1C 5.3 09/12/2023       I have explained the risks and benefits of this endoscopic procedure to the patient including but not limited to bleeding, inflammation, infection, perforation, missing a lesion and death.      Varun Zepeda MD

## 2024-04-18 NOTE — ANESTHESIA PREPROCEDURE EVALUATION
[facility]  Anesthesia Pre-Operative Evaluation       Patient Name: Gurwinder Hair  YOB: 1955  MRN: [id number]  [facility]: [id number]      Code Status: Prior   Date of Procedure: 4/18/2024  Anesthesia: Choice Procedure: Procedure(s) (LRB):  COLONOSCOPY (N/A)  Pre-Operative Diagnosis: Encounter for colorectal cancer screening [Z12.11, Z12.12]  Proceduralist: Surgeons and Role:     * Varun Zepeda MD - Primary        SUBJECTIVE:   Gurwinder Hair is a 69 y.o. male who  has a past medical history of Asthma, CRI (chronic renal insufficiency) (2/21/2022), Disorder of kidney and ureter, History of bacterial pneumonia (1/23/2020), History of solitary pulmonary nodule (2/12/2021), Hyperlipidemia, Hypertension, Malignant neoplasm of right upper lobe of lung (12/13/2022), Obesity, Other closed fractures of distal end of radius (alone) (12/27/2013), and Tobacco dependence. No notes on file    COPD on home O2, SLOANE non compliant w CPAP. Ate crawfish yesterday.    Anticoagulants   Medication Route Frequency       he has a current medication list which includes the following long-term medication(s): amlodipine, atorvastatin, clobetasol, ipratropium-albuterol, ipratropium-albuterol, losartan, and pantoprazole.   ALLERGIES:     Review of patient's allergies indicates:   Allergen Reactions    Codeine Itching    Wellbutrin [bupropion hcl] Rash            LDA:          Lines/Drains/Airways       None                 MEDICATIONS:     Antibiotics (From admission, onward)      None          VTE Risk Mitigation (From admission, onward)      None          Current Facility-Administered Medications   Medication Dose Route Frequency Provider Last Rate Last Admin    0.9%  NaCl infusion   Intravenous Continuous Varun Zepeda MD              History:   There are no hospital problems to display for this patient.    Surgical History:    has a past surgical history that includes Finger fracture surgery and robotic bronchoscopy  (N/A, 10/21/2022).   Social History:    reports that he is not currently sexually active.  reports that he has been smoking cigarettes. He started smoking about 57 years ago. He has a 52 pack-year smoking history. He has never used smokeless tobacco. He reports that he does not currently use alcohol. He reports that he does not use drugs.     OBJECTIVE:     Vital Signs (Most Recent):    Vital Signs Range (Last 24H):          There is no height or weight on file to calculate BMI.   Wt Readings from Last 4 Encounters:   12/20/23 96.2 kg (212 lb)   09/22/23 99.9 kg (220 lb 3.8 oz)   09/08/23 98.9 kg (218 lb 0.6 oz)   07/25/23 97.5 kg (214 lb 15.2 oz)     Significant Labs:  Lab Results   Component Value Date    WBC 6.47 11/17/2022    HGB 16.5 11/17/2022    HCT 51.2 11/17/2022     11/17/2022     07/11/2023    K 3.5 07/11/2023     07/11/2023    CREATININE 1.2 07/11/2023    BUN 14 07/11/2023    CO2 25 07/11/2023    GLU 87 07/11/2023    CALCIUM 9.7 07/11/2023    ALKPHOS 78 07/11/2023    ALT 27 07/11/2023    AST 25 07/11/2023    ALBUMIN 4.4 07/11/2023    HGBA1C 5.3 09/12/2023    TROPONINI 0.007 06/21/2022    BNP <10 06/21/2022     No LMP for male patient.  No results found for this or any previous visit (from the past 72 hour(s)).    EKG:   Results for orders placed or performed in visit on 01/31/23   EKG 12-lead    Collection Time: 01/31/23  9:36 AM    Narrative    Test Reason : I71.43,    Vent. Rate : 076 BPM     Atrial Rate : 076 BPM     P-R Int : 190 ms          QRS Dur : 076 ms      QT Int : 418 ms       P-R-T Axes : 086 -44 083 degrees     QTc Int : 470 ms    Normal sinus rhythm with sinus arrhythmia  Left axis deviation  Inferior infarct ,age undetermined  Abnormal ECG  When compared with ECG of 21-JUN-2022 19:08,  Inferior infarct is now Present  Nonspecific T wave abnormality, worse in Anterior-lateral leads  Confirmed by Larisa GOODEN, [facility] BRENNAN ([id number]) on 2/7/2023 2:37:07 PM    Referred By:              "Confirmed By:Constantino Peres MD       TTE:  Results for orders placed or performed during the hospital encounter of 07/12/22   Echo   Result Value Ref Range    Ascending aorta 3.43 cm    STJ 2.75 cm    AV mean gradient 3 mmHg    Ao peak emil 1.01 m/s    Ao VTI 23.01 cm    IVRT 143.02 msec    IVS 0.85 0.6 - 1.1 cm    LA size 3.24 cm    Left Atrium Major Axis 3.86 cm    Left Atrium Minor Axis 4.97 cm    LVIDd 4.36 3.5 - 6.0 cm    LVIDs 2.79 2.1 - 4.0 cm    LVOT diameter 2.12 cm    LVOT peak VTI 18.85 cm    Posterior Wall 0.81 0.6 - 1.1 cm    MV Peak A Emil 1.10 m/s    E wave deceleration time 151.66 msec    MV Peak E Emil 0.56 m/s    PV Peak D Emil 0.43 m/s    PV Peak S Emil 0.50 m/s    RA Major Axis 5.04 cm    RVDD 2.42 cm    Sinus 3.14 cm    TR Max Emil 2.11 m/s    LA WIDTH 3.35 cm    PV PEAK VELOCITY 0.90 cm/s    MV stenosis pressure 1/2 time 43.98 ms    LV Diastolic Volume 85.96 mL    LV Systolic Volume 29.37 mL    LVOT peak emil 0.86 m/s    MV "A" wave duration 11.07 msec    TDI LATERAL 0.08 m/s    TDI SEPTAL 0.07 m/s    LV LATERAL E/E' RATIO 7.00 m/s    LV SEPTAL E/E' RATIO 8.00 m/s    FS 36 %    LA volume 40.09 cm3    LV mass 113.16 g    Left Ventricle Relative Wall Thickness 0.37 cm    AV valve area 2.89 cm2    AV Velocity Ratio 0.85     AV index (prosthetic) 0.82     MV valve area p 1/2 method 5.00 cm2    E/A ratio 0.51     Mean e' 0.08 m/s    Pulm vein S/D ratio 1.16     LVOT area 3.5 cm2    LVOT stroke volume 66.50 cm3    AV peak gradient 4 mmHg    E/E' ratio 7.47 m/s    Triscuspid Valve Regurgitation Peak Gradient 18 mmHg    BSA 2.09 m2    LV Systolic Volume Index 14.5 mL/m2    LV Diastolic Volume Index 42.34 mL/m2    LA Volume Index 19.7 mL/m2    LV Mass Index 56 g/m2    LA Volume Index (Mod) 18.7 mL/m2    LA volume (mod) 38.00 cm3    EF 60 %    Narrative    · The left ventricle is normal in size with normal systolic function.  · The estimated ejection fraction is 60%.  · Grade I left ventricular diastolic " "dysfunction.  · Normal right ventricular size with normal right ventricular systolic   function.        EF   Date Value Ref Range Status   07/12/2022 60 % Final     Nuc Stress EF   Date Value Ref Range Status   07/12/2022 62 % Final     Nuc Rest EF   Date Value Ref Range Status   07/12/2022 68  Final      No results found for this or any previous visit.  BINH:  No results found for this or any previous visit.  Stress Test:  Results for orders placed during the hospital encounter of 07/12/22    Nuclear Stress - Cardiology Interpreted    Interpretation Summary    Normal myocardial perfusion scan. There is no evidence of myocardial ischemia or infarction.    The gated perfusion images showed an ejection fraction of 68% at rest. The gated perfusion images showed an ejection fraction of 62% post stress.    There is normal wall motion at rest and post stress.    The EKG portion of this study is negative for ischemia.    The patient reported no chest pain during the stress test.    There were no arrhythmias during stress.    The exercise capacity was below average.     LHC:  No results found for this or any previous visit.     PFT:  No results found for: "FEV1", "FVC", "VNY5AXU", "TLC", "DLCO"   ASSESSMENT/PLAN:           Pre-op Assessment          Review of Systems  Cardiovascular:     Hypertension                                  Hypertension         Pulmonary:   COPD Asthma    Sleep Apnea   Asthma:   Chronic Obstructive Pulmonary Disease (COPD):           Obstructive Sleep Apnea (SLOANE).           Renal/:  Chronic Renal Disease        Kidney Function/Disease             Hepatic/GI:     GERD      Gerd              Physical Exam  General: Well nourished    Airway:  Mallampati: III / II  Mouth Opening: Normal  TM Distance: Normal  Tongue: Normal  Neck ROM: Normal ROM    Dental:  Intact        Anesthesia Plan  Type of Anesthesia, risks & benefits discussed:    Anesthesia Type: Gen ETT, Gen Natural Airway, MAC  Intra-op " Monitoring Plan: Standard ASA Monitors  Post Op Pain Control Plan: multimodal analgesia and IV/PO Opioids PRN  Induction:  IV  Airway Plan: Direct and Video, Post-Induction  Informed Consent: Informed consent signed with the Patient and all parties understand the risks and agree with anesthesia plan.  All questions answered.   ASA Score: 3  Day of Surgery Review of History & Physical: H&P completed by Anesthesiologist.    Ready For Surgery From Anesthesia Perspective.     .

## 2024-04-18 NOTE — [employer]ATION PATIENT INSTRUCTIONS
Discharge Summary/Instructions after an Endoscopic Procedure  Patient Name: Gurwinder Hair  Patient MRN: [id number]  Patient YOB: 1955 Thursday, April 18, 2024  Varun Zepeda MD  Dear patient,  As a result of recent federal legislation (The Federal Cures Act), you may   receive lab or pathology results from your procedure in your [facility]   account before your physician is able to contact you. Your physician or   their representative will relay the results to you with their   recommendations at their soonest availability.  Thank you,  RESTRICTIONS:  During your procedure today, you received medications for sedation.  These   medications may affect your judgment, balance and coordination.  Therefore,   for 24 hours, you have the following restrictions:   - DO NOT drive a car, operate machinery, make legal/financial decisions,   sign important papers or drink alcohol.    ACTIVITY:  Today: no heavy lifting, straining or running due to procedural   sedation/anesthesia.  The following day: return to full activity including work.  DIET:  Eat and drink normally unless instructed otherwise.     TREATMENT FOR COMMON SIDE EFFECTS:  - Mild abdominal pain, nausea, belching, bloating or excessive gas:  rest,   eat lightly and use a heating pad.  - Sore Throat: treat with throat lozenges and/or gargle with warm salt   water.  - Because air was used during the procedure, expelling large amounts of air   from your rectum or belching is normal.  - If a bowel prep was taken, you may not have a bowel movement for 1-3 days.    This is normal.  SYMPTOMS TO WATCH FOR AND REPORT TO YOUR PHYSICIAN:  1. Abdominal pain or bloating, other than gas cramps.  2. Chest pain.  3. Back pain.  4. Signs of infection such as: chills or fever occurring within 24 hours   after the procedure.  5. Rectal bleeding, which would show as bright red, maroon, or black stools.   (A tablespoon of blood from the rectum is not serious, especially if    hemorrhoids are present.)  6. Vomiting.  7. Weakness or dizziness.  GO DIRECTLY TO THE NEAREST EMERGENCY ROOM IF YOU HAVE ANY OF THE FOLLOWING:      Difficulty breathing              Chills and/or fever over 101 F   Persistent vomiting and/or vomiting blood   Severe abdominal pain   Severe chest pain   Black, tarry stools   Bleeding- more than one tablespoon   Any other symptom or condition that you feel may need urgent attention  Your doctor recommends these additional instructions:  If any biopsies were taken, your doctors clinic will contact you in 1 to 2   weeks with any results.  - Patient has a contact number available for emergencies.  The signs and   symptoms of potential delayed complications were discussed with the   patient.  Return to normal activities tomorrow.  Written discharge   instructions were provided to the patient.   - Discharge patient to home (ambulatory).   - Resume previous diet.   - Continue present medications.   - Await pathology results.   - Repeat colonoscopy in 3 years for surveillance. Recommend 24hr clear   liquid diet prior to future colonoscopies.  - Return to primary care physician.  For questions, problems or results please call your physician - Varun Zepeda MD at Work:  [phone].  [facility], EMERGENCY ROOM PHONE NUMBER: [phone]  IF A COMPLICATION OR EMERGENCY SITUATION ARISES AND YOU ARE UNABLE TO REACH   YOUR PHYSICIAN - GO DIRECTLY TO THE EMERGENCY ROOM.  Varun Zepeda MD  4/18/2024 9:16:14 AM  This report has been verified and signed electronically.  Dear patient,  As a result of recent federal legislation (The Federal Cures Act), you may   receive lab or pathology results from your procedure in your [facility]   account before your physician is able to contact you. Your physician or   their representative will relay the results to you with their   recommendations at their soonest availability.  Thank you,  [employer]ATION

## 2024-04-19 NOTE — ANESTHESIA POSTPROCEDURE EVALUATION
Anesthesia Post Evaluation    Patient: Gurwinder Hair    Procedure(s) Performed: Procedure(s) (LRB):  COLONOSCOPY (N/A)    Final Anesthesia Type: general      Patient location during evaluation: PACU  Patient participation: Yes- Able to Participate  Level of consciousness: awake and alert  Post-procedure vital signs: reviewed and stable  Pain management: adequate  Airway patency: patent    PONV status at discharge: No PONV  Anesthetic complications: no      Cardiovascular status: blood pressure returned to baseline  Respiratory status: unassisted  Hydration status: euvolemic  Follow-up not needed.              Vitals Value Taken Time   /80 04/18/24 0946   Temp 36.7 °C (98 °F) 04/18/24 0917   Pulse 99 04/18/24 0946   Resp 16 04/18/24 0946   SpO2 95 % 04/18/24 0946         Event Time   Out of Recovery 09:47:08         Pain/Jose Score: Jose Score: 10 (4/18/2024  9:17 AM)

## 2024-04-25 LAB
FINAL PATHOLOGIC DIAGNOSIS: NORMAL
GROSS: NORMAL
Lab: NORMAL

## 2024-05-02 ENCOUNTER — CLINICAL SUPPORT (OUTPATIENT)
Dept: SMOKING CESSATION | Facility: CLINIC | Age: 69
End: 2024-05-02
Payer: COMMERCIAL

## 2024-05-02 DIAGNOSIS — F17.200 NICOTINE DEPENDENCE: Primary | ICD-10-CM

## 2024-05-02 PROCEDURE — 99406 BEHAV CHNG SMOKING 3-10 MIN: CPT | Mod: S$GLB,,,

## 2024-05-02 NOTE — PROGRESS NOTES
Spoke with patient today in regard to smoking cessation progress for 6 month telephone follow up, she states not tobacco free.  Patient states not ready to schedule for the program at this time.  Patient states he will contact at a later time to schedule an appointment.  Informed patient of benefit period, future follow up, and contact information if any further help or support is needed.  Will complete smart form for 6 month follow up on Quit attempt #3.

## 2024-05-10 ENCOUNTER — OFFICE VISIT (OUTPATIENT)
Dept: PRIMARY CARE CLINIC | Facility: CLINIC | Age: 69
End: 2024-05-10
Payer: MEDICARE

## 2024-05-10 VITALS
HEART RATE: 67 BPM | WEIGHT: 211 LBS | TEMPERATURE: 98 F | BODY MASS INDEX: 33.91 KG/M2 | DIASTOLIC BLOOD PRESSURE: 79 MMHG | HEIGHT: 66 IN | OXYGEN SATURATION: 97 % | SYSTOLIC BLOOD PRESSURE: 121 MMHG

## 2024-05-10 DIAGNOSIS — I10 ESSENTIAL HYPERTENSION: Chronic | ICD-10-CM

## 2024-05-10 DIAGNOSIS — I10 ESSENTIAL HYPERTENSION: ICD-10-CM

## 2024-05-10 DIAGNOSIS — F17.210 SMOKES LESS THAN 1 PACK A DAY WITH GREATER THAN 40 PACK YEAR HISTORY: ICD-10-CM

## 2024-05-10 DIAGNOSIS — E78.5 HYPERLIPIDEMIA, UNSPECIFIED HYPERLIPIDEMIA TYPE: ICD-10-CM

## 2024-05-10 DIAGNOSIS — J44.9 COPD, SEVERITY TO BE DETERMINED: ICD-10-CM

## 2024-05-10 DIAGNOSIS — I71.43 INFRARENAL ABDOMINAL AORTIC ANEURYSM (AAA) WITHOUT RUPTURE: ICD-10-CM

## 2024-05-10 DIAGNOSIS — E66.09 CLASS 1 OBESITY DUE TO EXCESS CALORIES WITH SERIOUS COMORBIDITY AND BODY MASS INDEX (BMI) OF 34.0 TO 34.9 IN ADULT: ICD-10-CM

## 2024-05-10 DIAGNOSIS — R91.1 LUNG NODULE: Primary | ICD-10-CM

## 2024-05-10 LAB
ALBUMIN/CREAT UR: 5.5 UG/MG (ref 0–30)
BACTERIA #/AREA URNS AUTO: NORMAL /HPF
BILIRUB UR QL STRIP: NEGATIVE
CAOX CRY UR QL COMP ASSIST: NORMAL
CLARITY UR REFRACT.AUTO: CLEAR
COLOR UR AUTO: YELLOW
CREAT UR-MCNC: 361 MG/DL (ref 23–375)
GLUCOSE UR QL STRIP: NEGATIVE
HGB UR QL STRIP: NEGATIVE
HYALINE CASTS UR QL AUTO: 0 /LPF
KETONES UR QL STRIP: NEGATIVE
LEUKOCYTE ESTERASE UR QL STRIP: NEGATIVE
MICROALBUMIN UR DL<=1MG/L-MCNC: 20 UG/ML
MICROSCOPIC COMMENT: NORMAL
NITRITE UR QL STRIP: NEGATIVE
PH UR STRIP: 7 [PH] (ref 5–8)
PROT UR QL STRIP: ABNORMAL
RBC #/AREA URNS AUTO: 0 /HPF (ref 0–4)
SP GR UR STRIP: 1.03 (ref 1–1.03)
URN SPEC COLLECT METH UR: ABNORMAL
WBC #/AREA URNS AUTO: 2 /HPF (ref 0–5)

## 2024-05-10 PROCEDURE — 1126F AMNT PAIN NOTED NONE PRSNT: CPT | Mod: CPTII,S$GLB,, | Performed by: STUDENT IN AN ORGANIZED HEALTH CARE EDUCATION/TRAINING PROGRAM

## 2024-05-10 PROCEDURE — 99214 OFFICE O/P EST MOD 30 MIN: CPT | Mod: S$GLB,,, | Performed by: STUDENT IN AN ORGANIZED HEALTH CARE EDUCATION/TRAINING PROGRAM

## 2024-05-10 PROCEDURE — 1101F PT FALLS ASSESS-DOCD LE1/YR: CPT | Mod: CPTII,S$GLB,, | Performed by: STUDENT IN AN ORGANIZED HEALTH CARE EDUCATION/TRAINING PROGRAM

## 2024-05-10 PROCEDURE — 1159F MED LIST DOCD IN RCRD: CPT | Mod: CPTII,S$GLB,, | Performed by: STUDENT IN AN ORGANIZED HEALTH CARE EDUCATION/TRAINING PROGRAM

## 2024-05-10 PROCEDURE — 3074F SYST BP LT 130 MM HG: CPT | Mod: CPTII,S$GLB,, | Performed by: STUDENT IN AN ORGANIZED HEALTH CARE EDUCATION/TRAINING PROGRAM

## 2024-05-10 PROCEDURE — 3288F FALL RISK ASSESSMENT DOCD: CPT | Mod: CPTII,S$GLB,, | Performed by: STUDENT IN AN ORGANIZED HEALTH CARE EDUCATION/TRAINING PROGRAM

## 2024-05-10 PROCEDURE — 81001 URINALYSIS AUTO W/SCOPE: CPT | Performed by: STUDENT IN AN ORGANIZED HEALTH CARE EDUCATION/TRAINING PROGRAM

## 2024-05-10 PROCEDURE — 82043 UR ALBUMIN QUANTITATIVE: CPT | Performed by: STUDENT IN AN ORGANIZED HEALTH CARE EDUCATION/TRAINING PROGRAM

## 2024-05-10 PROCEDURE — 3078F DIAST BP <80 MM HG: CPT | Mod: CPTII,S$GLB,, | Performed by: STUDENT IN AN ORGANIZED HEALTH CARE EDUCATION/TRAINING PROGRAM

## 2024-05-10 PROCEDURE — 4010F ACE/ARB THERAPY RXD/TAKEN: CPT | Mod: CPTII,S$GLB,, | Performed by: STUDENT IN AN ORGANIZED HEALTH CARE EDUCATION/TRAINING PROGRAM

## 2024-05-10 PROCEDURE — 3008F BODY MASS INDEX DOCD: CPT | Mod: CPTII,S$GLB,, | Performed by: STUDENT IN AN ORGANIZED HEALTH CARE EDUCATION/TRAINING PROGRAM

## 2024-05-10 PROCEDURE — 99999 PR PBB SHADOW E&M-EST. PATIENT-LVL IV: CPT | Mod: PBBFAC,,, | Performed by: STUDENT IN AN ORGANIZED HEALTH CARE EDUCATION/TRAINING PROGRAM

## 2024-05-10 RX ORDER — LOSARTAN POTASSIUM 100 MG/1
100 TABLET ORAL DAILY
Qty: 90 TABLET | Refills: 3 | Status: SHIPPED | OUTPATIENT
Start: 2024-05-10

## 2024-05-10 RX ORDER — ALBUTEROL SULFATE 90 UG/1
AEROSOL, METERED RESPIRATORY (INHALATION)
COMMUNITY

## 2024-05-10 RX ORDER — AMLODIPINE BESYLATE 10 MG/1
10 TABLET ORAL DAILY
Qty: 90 TABLET | Refills: 3 | Status: SHIPPED | OUTPATIENT
Start: 2024-05-10

## 2024-05-10 NOTE — PROGRESS NOTES
Subjective     Patient ID: Gurwinder Hair is a 69 y.o. male.    Chief Complaint: Annual Exam (Chills/fever and increased BP 1 wk ago. /Recently had RSV and Tetanus shot about 1.5 wk ago./Recent colonoscopy about 2 wk ago. ) and Hip Pain (Felt prior to rain. Pain severe to where as pt does not want to get out of bed. )    Pt here for annual visit. No big health complaints currently. Recently got all vaccines and colonoscopy - recommend repeat in 3 years. Taking all medications as prescribed with good BP control. Not taking pantoprazole currently since he ran out, and also since he had some L hip pain from his psoriatic arthritis that acts up when it rains. Taking advil PM for the hip pain, which brings relief.     Pt smokes a pack a day, for 60 years. Does not drink, or use rec drugs.  Lives alone apt 1st floor, feels safe with no barriers to daily activities. 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      Review of Systems   Constitutional:  Negative for chills, fever and unexpected weight change.   HENT:  Positive for sneezing. Negative for ear pain and sinus pressure/congestion.    Eyes:  Negative for visual disturbance.   Respiratory:  Negative for chest tightness and shortness of breath.    Cardiovascular:  Negative for chest pain, palpitations and leg swelling.   Gastrointestinal:  Negative for blood in stool and change in bowel habit.   Genitourinary:  Negative for difficulty urinating, dysuria and hematuria.   Musculoskeletal:  Positive for arthralgias.        L hip pain intermittent, worse at night, with weather changes   Allergic/Immunologic: Positive for environmental allergies.   Neurological:  Negative for dizziness, syncope and light-headedness.   Hematological:  Negative for adenopathy.   Psychiatric/Behavioral:  Negative for behavioral problems. The patient is not nervous/anxious.           Objective     Physical Exam  Constitutional:       Appearance: Normal appearance.   HENT:      Head:  Normocephalic.      Nose: Nose normal.      Mouth/Throat:      Mouth: Mucous membranes are moist.      Pharynx: Oropharynx is clear.   Eyes:      Extraocular Movements: Extraocular movements intact.      Conjunctiva/sclera: Conjunctivae normal.   Cardiovascular:      Rate and Rhythm: Normal rate and regular rhythm.      Pulses: Normal pulses.      Heart sounds: Normal heart sounds.   Pulmonary:      Effort: Pulmonary effort is normal. No respiratory distress.      Breath sounds: Normal breath sounds. No wheezing.   Abdominal:      General: Abdomen is flat.      Palpations: Abdomen is soft.   Musculoskeletal:         General: Normal range of motion.      Cervical back: Normal range of motion.   Skin:     General: Skin is warm and dry.   Neurological:      General: No focal deficit present.      Mental Status: He is alert.   Psychiatric:         Mood and Affect: Mood normal.         Behavior: Behavior normal.       Medication List with Changes/Refills   Current Medications    ALBUTEROL (PROAIR HFA) 90 MCG/ACTUATION INHALER    INHALE 2 PUFFS (180 MCG) BY INHALATION ROUTE EVERY 4-6 HOURS AS NEEDED    ATORVASTATIN (LIPITOR) 40 MG TABLET    TAKE 1 TABLET(40 MG) BY MOUTH EVERY EVENING    VANESSA LOW DOSE ASPIRIN ORAL    Take by mouth.    CLOBETASOL (TEMOVATE) 0.05 % CREAM    Apply topically 2 (two) times daily. Prn focal psoriasis.Stop using steroid topical when skin is smooth and non itchy.  Do not treat dark or red coloring.    IPRATROPIUM-ALBUTEROL (COMBIVENT)  MCG/ACTUATION INHALER    Inhale 2 puffs into the lungs every 6 (six) hours as needed for Wheezing. Rescue    IPRATROPIUM-ALBUTEROL (COMBIVENT)  MCG/ACTUATION INHALER    Inhale 2 puffs into the lungs every 6 (six) hours as needed for Wheezing. Rescue    MODERNA COVID BIVAL,6Y UP,,PF, 50 MCG/0.5 ML INJECTION        PANTOPRAZOLE (PROTONIX) 20 MG TABLET    TAKE 1 TABLET(20 MG) BY MOUTH EVERY DAY   Changed and/or Refilled Medications    Modified Medication  Previous Medication    AMLODIPINE (NORVASC) 10 MG TABLET amLODIPine (NORVASC) 10 MG tablet       Take 1 tablet (10 mg total) by mouth once daily.    TAKE 1 TABLET(10 MG) BY MOUTH EVERY DAY    LOSARTAN (COZAAR) 100 MG TABLET losartan (COZAAR) 100 MG tablet       Take 1 tablet (100 mg total) by mouth once daily.    TAKE 1 TABLET(100 MG) BY MOUTH EVERY DAY   Discontinued Medications    FLUAD QUAD 2022-23,65Y UP,,PF, 60 MCG (15 MCG X 4)/0.5 ML SYRG             Assessment and Plan     1. Lung nodule  Comments:  Stable on former CTs  Orders:  -     CT Chest Lung Screening Low Dose; Future; Expected date: 05/10/2024    2. Smokes less than 1 pack a day with greater than 40 pack year history  -     CT Chest Lung Screening Low Dose; Future; Expected date: 05/10/2024    3. Hyperlipidemia, unspecified hyperlipidemia type  LDL goal 70  Lipid panel up to date    5. Essential hypertension  Comments:well controlled continue current regimen  Orders:  -     Comprehensive Metabolic Panel; Future; Expected date: 05/10/2024  -     Microalbumin/creatinine urine ratio  -     amLODIPine (NORVASC) 10 MG tablet; Take 1 tablet (10 mg total) by mouth once daily.  Dispense: 90 tablet; Refill: 3  -     losartan (COZAAR) 100 MG tablet; Take 1 tablet (100 mg total) by mouth once daily.  Dispense: 90 tablet; Refill: 3    6. COPD, severity to be determined  Stable; continue inhalers as prescribed    7. Severe obesity (BMI 34.0-39.9) with comorbidity  Discussed lifestyle modifications    8. Infrarenal abdominal aortic aneurysm (AAA) without rupture  June 2023 AAA: IMPRESSION: Small abdominal aortic aneurysm measured 2.7 x 3.3 cm in the infrarenal aorta.   Stable       Min Sergio CARLOS         Follow up in about 6 months (around 11/10/2024) for routine health.    I hereby acknowledge that I am relying upon documentation authored by a medical student working under my supervision and further I hereby attest that I have verified the student documentation or  findings by personally performing the physical exam and medical decision making activities of the Evaluation and Management service to be billed.    Mercedes Acharya MD

## 2024-05-13 ENCOUNTER — HOSPITAL ENCOUNTER (OUTPATIENT)
Dept: RADIOLOGY | Facility: OTHER | Age: 69
Discharge: HOME | End: 2024-05-13
Attending: STUDENT IN AN ORGANIZED HEALTH CARE EDUCATION/TRAINING PROGRAM
Payer: MEDICARE

## 2024-05-13 DIAGNOSIS — R91.1 LUNG NODULE: ICD-10-CM

## 2024-05-13 DIAGNOSIS — F17.210 SMOKES LESS THAN 1 PACK A DAY WITH GREATER THAN 40 PACK YEAR HISTORY: ICD-10-CM

## 2024-05-13 PROCEDURE — 71271 CT THORAX LUNG CANCER SCR C-: CPT | Mod: TC

## 2024-05-13 PROCEDURE — 71271 CT THORAX LUNG CANCER SCR C-: CPT | Mod: 26,,, | Performed by: RADIOLOGY

## 2024-05-14 ENCOUNTER — TELEPHONE (OUTPATIENT)
Dept: PRIMARY CARE CLINIC | Facility: CLINIC | Age: 69
End: 2024-05-14
Payer: MEDICARE

## 2024-05-14 NOTE — TELEPHONE ENCOUNTER
----- Message from Mercedes Acharya MD sent at 5/14/2024  9:14 AM CDT -----  Please let pt know that his routine blood work was normal.

## 2024-05-24 DIAGNOSIS — K21.9 GASTROESOPHAGEAL REFLUX DISEASE WITHOUT ESOPHAGITIS: ICD-10-CM

## 2024-05-24 RX ORDER — PANTOPRAZOLE SODIUM 20 MG/1
20 TABLET, DELAYED RELEASE ORAL
Qty: 90 TABLET | Refills: 3 | Status: SHIPPED | OUTPATIENT
Start: 2024-05-24

## 2024-05-24 NOTE — TELEPHONE ENCOUNTER
No care due was identified.  [facility] Embedded Care Due Messages. Reference number: [id number].   5/24/2024 10:47:48 AM CDT

## 2024-05-24 NOTE — TELEPHONE ENCOUNTER
Refill Decision Note   Gurwinder Hair  is requesting a refill authorization.  Brief Assessment and Rationale for Refill:  Approve     Medication Therapy Plan:       Medication Reconciliation Completed: No   Comments:     No Care Gaps recommended.     Note composed:12:11 PM 05/24/2024

## 2024-07-05 ENCOUNTER — PATIENT OUTREACH (OUTPATIENT)
Dept: ADMINISTRATIVE | Facility: HOSPITAL | Age: 69
End: 2024-07-05
Payer: MEDICARE

## 2024-07-09 ENCOUNTER — PATIENT MESSAGE (OUTPATIENT)
Dept: ADMINISTRATIVE | Facility: HOSPITAL | Age: 69
End: 2024-07-09
Payer: MEDICARE

## 2024-07-11 DIAGNOSIS — F17.200 NICOTINE DEPENDENCE: ICD-10-CM

## 2024-07-11 RX ORDER — BUPROPION HYDROCHLORIDE 150 MG/1
TABLET, EXTENDED RELEASE ORAL
Qty: 60 TABLET | Refills: 11 | OUTPATIENT
Start: 2024-07-11

## 2024-07-11 NOTE — TELEPHONE ENCOUNTER
Refill Decision Note   Gurwinder Hair  is requesting a refill authorization.  Brief Assessment and Rationale for Refill:  Quick Discontinue     Medication Therapy Plan: The original prescription was discontinued on 5/16/2023 by Roya Cotto CTTS      Comments:     Note composed:10:03 AM 07/11/2024

## 2024-07-26 ENCOUNTER — OFFICE VISIT (OUTPATIENT)
Dept: PRIMARY CARE CLINIC | Facility: CLINIC | Age: 69
End: 2024-07-26
Payer: MEDICARE

## 2024-07-26 VITALS
TEMPERATURE: 99 F | HEIGHT: 67 IN | HEART RATE: 92 BPM | DIASTOLIC BLOOD PRESSURE: 87 MMHG | BODY MASS INDEX: 33.46 KG/M2 | OXYGEN SATURATION: 97 % | WEIGHT: 213.19 LBS | SYSTOLIC BLOOD PRESSURE: 134 MMHG

## 2024-07-26 DIAGNOSIS — L40.9 PSORIASIS: ICD-10-CM

## 2024-07-26 DIAGNOSIS — R21 RASH AND NONSPECIFIC SKIN ERUPTION: ICD-10-CM

## 2024-07-26 DIAGNOSIS — I10 ESSENTIAL HYPERTENSION: Primary | ICD-10-CM

## 2024-07-26 PROCEDURE — 3061F NEG MICROALBUMINURIA REV: CPT | Mod: HCNC,CPTII,S$GLB, | Performed by: STUDENT IN AN ORGANIZED HEALTH CARE EDUCATION/TRAINING PROGRAM

## 2024-07-26 PROCEDURE — 3288F FALL RISK ASSESSMENT DOCD: CPT | Mod: HCNC,CPTII,S$GLB, | Performed by: STUDENT IN AN ORGANIZED HEALTH CARE EDUCATION/TRAINING PROGRAM

## 2024-07-26 PROCEDURE — 99214 OFFICE O/P EST MOD 30 MIN: CPT | Mod: HCNC,S$GLB,, | Performed by: STUDENT IN AN ORGANIZED HEALTH CARE EDUCATION/TRAINING PROGRAM

## 2024-07-26 PROCEDURE — 1159F MED LIST DOCD IN RCRD: CPT | Mod: HCNC,CPTII,S$GLB, | Performed by: STUDENT IN AN ORGANIZED HEALTH CARE EDUCATION/TRAINING PROGRAM

## 2024-07-26 PROCEDURE — 3075F SYST BP GE 130 - 139MM HG: CPT | Mod: HCNC,CPTII,S$GLB, | Performed by: STUDENT IN AN ORGANIZED HEALTH CARE EDUCATION/TRAINING PROGRAM

## 2024-07-26 PROCEDURE — 4010F ACE/ARB THERAPY RXD/TAKEN: CPT | Mod: HCNC,CPTII,S$GLB, | Performed by: STUDENT IN AN ORGANIZED HEALTH CARE EDUCATION/TRAINING PROGRAM

## 2024-07-26 PROCEDURE — 1126F AMNT PAIN NOTED NONE PRSNT: CPT | Mod: HCNC,CPTII,S$GLB, | Performed by: STUDENT IN AN ORGANIZED HEALTH CARE EDUCATION/TRAINING PROGRAM

## 2024-07-26 PROCEDURE — 1101F PT FALLS ASSESS-DOCD LE1/YR: CPT | Mod: HCNC,CPTII,S$GLB, | Performed by: STUDENT IN AN ORGANIZED HEALTH CARE EDUCATION/TRAINING PROGRAM

## 2024-07-26 PROCEDURE — 3079F DIAST BP 80-89 MM HG: CPT | Mod: HCNC,CPTII,S$GLB, | Performed by: STUDENT IN AN ORGANIZED HEALTH CARE EDUCATION/TRAINING PROGRAM

## 2024-07-26 PROCEDURE — 99999 PR PBB SHADOW E&M-EST. PATIENT-LVL III: CPT | Mod: PBBFAC,HCNC,, | Performed by: STUDENT IN AN ORGANIZED HEALTH CARE EDUCATION/TRAINING PROGRAM

## 2024-07-26 PROCEDURE — 3008F BODY MASS INDEX DOCD: CPT | Mod: HCNC,CPTII,S$GLB, | Performed by: STUDENT IN AN ORGANIZED HEALTH CARE EDUCATION/TRAINING PROGRAM

## 2024-07-26 PROCEDURE — 3066F NEPHROPATHY DOC TX: CPT | Mod: HCNC,CPTII,S$GLB, | Performed by: STUDENT IN AN ORGANIZED HEALTH CARE EDUCATION/TRAINING PROGRAM

## 2024-07-26 RX ORDER — CLOBETASOL PROPIONATE 0.5 MG/G
CREAM TOPICAL 2 TIMES DAILY
Qty: 60 G | Refills: 1 | Status: SHIPPED | OUTPATIENT
Start: 2024-07-26

## 2024-07-26 NOTE — PROGRESS NOTES
07/26/2024    Gurwinder Hair  [id number]    Chief Complaint   Patient presents with    Follow-up     6 mo F/U.   HTN questions.        HPI    This pt is known to me and presents for routine follow up. Chronic conditions: HTN, lung nodule, tobacco use, psoriasis and vitaligo      HTN  Follows with digital medicine and has been running high  Feels like home cuff squeezes too tight.   Taking valsartan 320 mg  and amlodipine 10 mg    Of note thinks that he is breathing better sine taking valsartan and not getting dizzy      Negative 10 point ROS outside of HPI    Social History     Socioeconomic History    Marital status:    Tobacco Use    Smoking status: Every Day     Current packs/day: 1.00     Average packs/day: 0.9 packs/day for 57.5 years (52.3 ttl pk-yrs)     Types: Cigarettes     Start date: 1/23/1967    Smokeless tobacco: Never   Substance and Sexual Activity    Alcohol use: Not Currently     Comment: social    Drug use: Never    Sexual activity: Not Currently   Social History Narrative    Tobacco: Initiated at age 11 yo; max use 1ppd    EtOH: None    Drug: None    Employment: Retired     Education: 1 year of college    Lives alone         2 adult children      Social Determinants of Health     Financial Resource Strain: Low Risk  (9/8/2023)    Overall Financial Resource Strain (CARDIA)     Difficulty of Paying Living Expenses: Not very hard   Food Insecurity: No Food Insecurity (9/8/2023)    Hunger Vital Sign     Worried About Running Out of Food in the Last Year: Never true     Ran Out of Food in the Last Year: Never true   Transportation Needs: No Transportation Needs (9/8/2023)    PRAPARE - Transportation     Lack of Transportation (Medical): No     Lack of Transportation (Non-Medical): No   Physical Activity: Insufficiently Active (9/8/2023)    Exercise Vital Sign     Days of Exercise per Week: 3 days     Minutes of Exercise per Session: 30 min   Stress: No Stress Concern Present  (9/8/2023)    [employer] - Occupational Stress Questionnaire     Feeling of Stress : Not at all   Housing Stability: Low Risk  (9/8/2023)    Housing Stability Vital Sign     Unable to Pay for Housing in the Last Year: No     Number of Places Lived in the Last Year: 1     Unstable Housing in the Last Year: No           Current Outpatient Medications:     albuterol (PROAIR HFA) 90 mcg/actuation inhaler, INHALE 2 PUFFS (180 MCG) BY INHALATION ROUTE EVERY 4-6 HOURS AS NEEDED, Disp: , Rfl:     amLODIPine (NORVASC) 10 MG tablet, Take 1 tablet (10 mg total) by mouth once daily., Disp: 90 tablet, Rfl: 3    atorvastatin (LIPITOR) 40 MG tablet, TAKE 1 TABLET(40 MG) BY MOUTH EVERY EVENING, Disp: 90 tablet, Rfl: 2    VANESSA LOW DOSE ASPIRIN ORAL, Take by mouth., Disp: , Rfl:     clobetasoL (TEMOVATE) 0.05 % cream, Apply topically 2 (two) times daily. Prn focal psoriasis.Stop using steroid topical when skin is smooth and non itchy.  Do not treat dark or red coloring., Disp: 60 g, Rfl: 1    ipratropium-albuteroL (COMBIVENT)  mcg/actuation inhaler, Inhale 2 puffs into the lungs every 6 (six) hours as needed for Wheezing. Rescue, Disp: 4 g, Rfl: 0    pantoprazole (PROTONIX) 20 MG tablet, TAKE 1 TABLET(20 MG) BY MOUTH EVERY DAY, Disp: 90 tablet, Rfl: 3    valsartan (DIOVAN) 320 MG tablet, Take 1 tablet (320 mg total) by mouth once daily. Replaces losartan., Disp: 90 tablet, Rfl: 1    ipratropium-albuteroL (COMBIVENT)  mcg/actuation inhaler, Inhale 2 puffs into the lungs every 6 (six) hours as needed for Wheezing. Rescue (Patient not taking: Reported on 7/26/2024), Disp: 4 g, Rfl: 0    MODERNA COVID BIVAL,6Y UP,,PF, 50 mcg/0.5 mL injection, , Disp: , Rfl:       Physical Exam  Vitals:    07/26/24 1521   BP: 134/87   Pulse: 92   Temp: 98.5 °F (36.9 °C)       Physical Exam      Gen: well appearing, NAD  Resp: non labored breathing, no crackles, no wheezes, CTAB  CV: RRR no murmur, gallops, rubs,  no LE edema  Abd: soft nontender BS present no organomegaly    1. Essential hypertension  Instructed to bring  home BP cuff for nurse visit   Continue current regimen    2. Rash and nonspecific skin eruption  - clobetasoL (TEMOVATE) 0.05 % cream; Apply topically 2 (two) times daily. Prn focal psoriasis.Stop using steroid topical when skin is smooth and non itchy.  Do not treat dark or red coloring.  Dispense: 60 g; Refill: 1    3. Psoriasis  - REFILLED clobetasoL (TEMOVATE) 0.05 % cream; Apply topically 2 (two) times daily. Prn focal psoriasis.Stop using steroid topical when skin is smooth and non itchy.  Do not treat dark or red coloring.  Dispense: 60 g; Refill: 1      RTC in 6 months for routine care    Mercedes Acharya MD  Family Medicine

## 2024-10-23 ENCOUNTER — PATIENT MESSAGE (OUTPATIENT)
Dept: ADMINISTRATIVE | Facility: OTHER | Age: 69
End: 2024-10-23
Payer: MEDICARE

## 2024-11-07 ENCOUNTER — TELEPHONE (OUTPATIENT)
Dept: SMOKING CESSATION | Facility: CLINIC | Age: 69
End: 2024-11-07
Payer: MEDICARE

## 2024-11-20 ENCOUNTER — TELEPHONE (OUTPATIENT)
Dept: SMOKING CESSATION | Facility: CLINIC | Age: 69
End: 2024-11-20
Payer: MEDICARE

## 2024-11-20 NOTE — TELEPHONE ENCOUNTER
2nd attempt, left message regarding smoking cessation 12 month follow up on quit 3 episode. Unable to reach patient. Will resolve episode.

## 2025-01-01 DIAGNOSIS — J44.1 COPD EXACERBATION: ICD-10-CM

## 2025-01-01 NOTE — TELEPHONE ENCOUNTER
Care Due:                  Date            Visit Type   Department     Provider  --------------------------------------------------------------------------------                                EP -                              PRIMARY      [facility] PRIMARY  Last Visit: 07-      CARE ([facility])   SANTOS Acharya  Next Visit: None Scheduled  None         None Found                                                            Last  Test          Frequency    Reason                     Performed    Due Date  --------------------------------------------------------------------------------    Lipid Panel.  12 months..  atorvastatin.............  07- 07-    Health [facility] Embedded Care Due Messages. Reference number: [id number].   1/01/2025 10:27:05 AM CST

## 2025-01-09 ENCOUNTER — OFFICE VISIT (OUTPATIENT)
Dept: INTERNAL MEDICINE | Facility: CLINIC | Age: 70
End: 2025-01-09
Payer: MEDICARE

## 2025-01-09 VITALS
HEART RATE: 97 BPM | SYSTOLIC BLOOD PRESSURE: 126 MMHG | BODY MASS INDEX: 34.29 KG/M2 | HEIGHT: 67 IN | OXYGEN SATURATION: 97 % | WEIGHT: 218.5 LBS | DIASTOLIC BLOOD PRESSURE: 80 MMHG

## 2025-01-09 DIAGNOSIS — Z00.00 ENCOUNTER FOR PREVENTIVE HEALTH EXAMINATION: Primary | ICD-10-CM

## 2025-01-09 DIAGNOSIS — R91.1 LUNG NODULE: ICD-10-CM

## 2025-01-09 DIAGNOSIS — I70.0 AORTIC ATHEROSCLEROSIS: ICD-10-CM

## 2025-01-09 DIAGNOSIS — I71.43 INFRARENAL ABDOMINAL AORTIC ANEURYSM (AAA) WITHOUT RUPTURE: ICD-10-CM

## 2025-01-09 DIAGNOSIS — Z85.118 PERSONAL HISTORY OF LUNG CANCER: ICD-10-CM

## 2025-01-09 DIAGNOSIS — L40.50 ARTHROPATHIC PSORIASIS, UNSPECIFIED: ICD-10-CM

## 2025-01-09 DIAGNOSIS — I77.1 TORTUOUS AORTA: ICD-10-CM

## 2025-01-09 DIAGNOSIS — E66.01 SEVERE OBESITY (BMI 35.0-39.9) WITH COMORBIDITY: ICD-10-CM

## 2025-01-09 DIAGNOSIS — J44.9 COPD, SEVERITY TO BE DETERMINED: ICD-10-CM

## 2025-01-09 DIAGNOSIS — E78.5 HYPERLIPIDEMIA, UNSPECIFIED HYPERLIPIDEMIA TYPE: ICD-10-CM

## 2025-01-09 DIAGNOSIS — K21.9 GASTROESOPHAGEAL REFLUX DISEASE WITHOUT ESOPHAGITIS: ICD-10-CM

## 2025-01-09 DIAGNOSIS — G47.30 SLEEP APNEA, UNSPECIFIED TYPE: ICD-10-CM

## 2025-01-09 DIAGNOSIS — Z72.0 TOBACCO ABUSE: ICD-10-CM

## 2025-01-09 DIAGNOSIS — R07.89 OTHER CHEST PAIN: ICD-10-CM

## 2025-01-09 DIAGNOSIS — I10 ESSENTIAL HYPERTENSION: ICD-10-CM

## 2025-01-09 PROCEDURE — 99999 PR PBB SHADOW E&M-EST. PATIENT-LVL IV: CPT | Mod: PBBFAC,HCNC,, | Performed by: NURSE PRACTITIONER

## 2025-01-09 NOTE — PROGRESS NOTES
"  Gurwinder Hair presented for an initial Medicare AWV today. The following components were reviewed and updated:    Medical history  Family History  Social history  Allergies and Current Medications  Health Risk Assessment  Health Maintenance  Care Team    **See Completed Assessments for Annual Wellness visit with in the encounter summary    The following assessments were completed:  Depression Screening  Cognitive function Screening  Timed Get Up Test  Whisper Test      Opioid documentation:      Patient does not have a current opioid prescription.            Vitals:    01/09/25 0750   BP: 126/80   BP Location: Left arm   Patient Position: Sitting   Pulse: 97   SpO2: 97%   Weight: 99.1 kg (218 lb 7.6 oz)   Height: 5' 7" (1.702 m)     Body mass index is 34.22 kg/m².       Physical Exam  Constitutional:       Appearance: Normal appearance. He is obese.   Pulmonary:      Effort: Pulmonary effort is normal.      Breath sounds: Normal breath sounds.   Neurological:      Mental Status: He is alert.         Diagnoses and health risks identified today and associated recommendations/orders:  1. Encounter for preventive health examination  Annual Health Risk Assessment (HRA) visit today.  Counseling and referral of health maintenance and preventative health measures performed.  Patient given annual wellness paperwork to take home.  Encouraged to return in 1 year for subsequent HRA visit.     2. Aortic atherosclerosis  Chronic. Stable. Continue current treatment plan as previously prescribed by PCP.    3. Tortuous aorta  Chronic. Stable. Continue current treatment plan as previously prescribed by PCP.    4. Arthropathic psoriasis, unspecified  Chronic. Stable. Continue current treatment plan as previously prescribed by PCP.    5. Severe obesity (BMI 35.0-39.9) with comorbidity  Chronic. Stable. Encouraged to increase exercise as tolerated and improve diet to heart healthy, low sodium diet. Continue current treatment plan as " previously prescribed by PCP.    6. COPD, severity to be determined  Chronic. Stable. Continue current treatment plan as previously prescribed by PCP.    7. Other chest pain  Chronic. Stable. Continue current treatment plan as previously prescribed by PCP.    8. Hyperlipidemia, unspecified hyperlipidemia type  Chronic. Stable. Continue current treatment plan as previously prescribed by PCP.    9. Essential hypertension  Chronic. Stable. Controlled. Encouraged to increase exercise as tolerated (moderate-intensity aerobic activity and muscle-strengthening activities) improve diet to heart healthy, low sodium diet.  Continue current treatment plan as previously prescribed by PCP.    10. Infrarenal abdominal aortic aneurysm (AAA) without rupture  Chronic. Stable. Continue current treatment plan as previously prescribed by PCP.    11. Personal history of lung cancer  Chronic. Stable. Continue current treatment plan as previously prescribed by PCP.    12. Gastroesophageal reflux disease without esophagitis  Chronic. Stable. Continue current treatment plan as previously prescribed by PCP.    13. Tobacco abuse  Chronic. Stable. Continue current treatment plan as previously prescribed by PCP.    14. Sleep apnea, unspecified type  Chronic. Stable. Continue current treatment plan as previously prescribed by PCP.    15. Lung nodule  Chronic. Stable. Continue current treatment plan as previously prescribed by PCP.      Provided Gurwinder with a 5-10 year written screening schedule and personal prevention plan. Recommendations were developed using the USPSTF age appropriate recommendations. Education, counseling, and referrals were provided as needed.  After Visit Summary printed and given to patient which includes a list of additional screenings\tests needed.    Follow up in about 1 year (around 1/9/2026).      Mayur Noble NP

## 2025-01-09 NOTE — PATIENT INSTRUCTIONS
Counseling and Referral of Other Preventative  (Italic type indicates deductible and co-insurance are waived)    Patient Name: Gurwinder Hair  Today's Date: 1/9/2025    Health Maintenance       Date Due Completion Date    PROSTATE-SPECIFIC ANTIGEN 09/12/2024 9/12/2023    COVID-19 Vaccine (5 - 2024-25 season) 12/19/2024 10/24/2024    Annual UACr 05/10/2025 5/10/2024    Hemoglobin A1c (Diabetic Prevention Screening) 09/12/2026 9/12/2023    Colorectal Cancer Screening 04/18/2027 4/18/2024    Lipid Panel 07/11/2028 7/11/2023    TETANUS VACCINE 04/17/2034 4/17/2024        No orders of the defined types were placed in this encounter.      The following information is provided to all patients.  This information is to help you find resources for any of the problems found today that may be affecting your health:                  Living healthy guide: www.[facility].[place].[facility]      Understanding Diabetes: www.diabetes.org      Eating healthy: www.cdc.gov/healthyweight      CDC home safety checklist: www.cdc.gov/steadi/patient.html      Agency on Aging: www.goea.[place].[facility]      Alcoholics anonymous (AA): www.aa.org      Physical Activity: www.amanuel.nih.gov/az1onbk      Tobacco use: www.quitwithusla.org

## 2025-02-12 ENCOUNTER — OFFICE VISIT (OUTPATIENT)
Dept: PRIMARY CARE CLINIC | Facility: CLINIC | Age: 70
End: 2025-02-12
Payer: MEDICARE

## 2025-02-12 ENCOUNTER — LAB VISIT (OUTPATIENT)
Dept: PRIMARY CARE CLINIC | Facility: CLINIC | Age: 70
End: 2025-02-12
Payer: MEDICARE

## 2025-02-12 VITALS
SYSTOLIC BLOOD PRESSURE: 129 MMHG | TEMPERATURE: 98 F | OXYGEN SATURATION: 97 % | DIASTOLIC BLOOD PRESSURE: 81 MMHG | BODY MASS INDEX: 33.96 KG/M2 | HEART RATE: 71 BPM | WEIGHT: 216.38 LBS | HEIGHT: 67 IN

## 2025-02-12 DIAGNOSIS — L40.9 PSORIASIS: ICD-10-CM

## 2025-02-12 DIAGNOSIS — Z23 IMMUNIZATION DUE: Primary | ICD-10-CM

## 2025-02-12 DIAGNOSIS — Z12.5 PROSTATE CANCER SCREENING: ICD-10-CM

## 2025-02-12 DIAGNOSIS — I10 ESSENTIAL HYPERTENSION: ICD-10-CM

## 2025-02-12 LAB
ALBUMIN SERPL BCP-MCNC: 4 G/DL (ref 3.5–5.2)
ALP SERPL-CCNC: 75 U/L (ref 40–150)
ALT SERPL W/O P-5'-P-CCNC: 18 U/L (ref 10–44)
ANION GAP SERPL CALC-SCNC: 12 MMOL/L (ref 8–16)
AST SERPL-CCNC: 19 U/L (ref 10–40)
BASOPHILS # BLD AUTO: 0.03 K/UL (ref 0–0.2)
BASOPHILS NFR BLD: 0.6 % (ref 0–1.9)
BILIRUB SERPL-MCNC: 0.6 MG/DL (ref 0.1–1)
BUN SERPL-MCNC: 15 MG/DL (ref 8–23)
CALCIUM SERPL-MCNC: 9.7 MG/DL (ref 8.7–10.5)
CHLORIDE SERPL-SCNC: 105 MMOL/L (ref 95–110)
CO2 SERPL-SCNC: 27 MMOL/L (ref 23–29)
COMPLEXED PSA SERPL-MCNC: 0.67 NG/ML (ref 0–4)
CREAT SERPL-MCNC: 1.3 MG/DL (ref 0.5–1.4)
DIFFERENTIAL METHOD BLD: ABNORMAL
EOSINOPHIL # BLD AUTO: 0.2 K/UL (ref 0–0.5)
EOSINOPHIL NFR BLD: 3.3 % (ref 0–8)
ERYTHROCYTE [DISTWIDTH] IN BLOOD BY AUTOMATED COUNT: 14.7 % (ref 11.5–14.5)
EST. GFR  (NO RACE VARIABLE): 59.5 ML/MIN/1.73 M^2
GLUCOSE SERPL-MCNC: 83 MG/DL (ref 70–110)
HCT VFR BLD AUTO: 51.8 % (ref 40–54)
HGB BLD-MCNC: 17.1 G/DL (ref 14–18)
IMM GRANULOCYTES # BLD AUTO: 0.01 K/UL (ref 0–0.04)
IMM GRANULOCYTES NFR BLD AUTO: 0.2 % (ref 0–0.5)
LYMPHOCYTES # BLD AUTO: 1.6 K/UL (ref 1–4.8)
LYMPHOCYTES NFR BLD: 32.2 % (ref 18–48)
MCH RBC QN AUTO: 30.9 PG (ref 27–31)
MCHC RBC AUTO-ENTMCNC: 33 G/DL (ref 32–36)
MCV RBC AUTO: 94 FL (ref 82–98)
MONOCYTES # BLD AUTO: 0.5 K/UL (ref 0.3–1)
MONOCYTES NFR BLD: 10.5 % (ref 4–15)
NEUTROPHILS # BLD AUTO: 2.6 K/UL (ref 1.8–7.7)
NEUTROPHILS NFR BLD: 53.2 % (ref 38–73)
NRBC BLD-RTO: 0 /100 WBC
PLATELET # BLD AUTO: 166 K/UL (ref 150–450)
PMV BLD AUTO: 11 FL (ref 9.2–12.9)
POTASSIUM SERPL-SCNC: 3.7 MMOL/L (ref 3.5–5.1)
PROT SERPL-MCNC: 7.5 G/DL (ref 6–8.4)
RBC # BLD AUTO: 5.53 M/UL (ref 4.6–6.2)
SODIUM SERPL-SCNC: 144 MMOL/L (ref 136–145)
TSH SERPL DL<=0.005 MIU/L-ACNC: 1.25 UIU/ML (ref 0.4–4)
WBC # BLD AUTO: 4.88 K/UL (ref 3.9–12.7)

## 2025-02-12 PROCEDURE — 85025 COMPLETE CBC W/AUTO DIFF WBC: CPT | Mod: HCNC | Performed by: STUDENT IN AN ORGANIZED HEALTH CARE EDUCATION/TRAINING PROGRAM

## 2025-02-12 PROCEDURE — 84153 ASSAY OF PSA TOTAL: CPT | Mod: HCNC | Performed by: STUDENT IN AN ORGANIZED HEALTH CARE EDUCATION/TRAINING PROGRAM

## 2025-02-12 PROCEDURE — 80053 COMPREHEN METABOLIC PANEL: CPT | Mod: HCNC | Performed by: STUDENT IN AN ORGANIZED HEALTH CARE EDUCATION/TRAINING PROGRAM

## 2025-02-12 PROCEDURE — 84443 ASSAY THYROID STIM HORMONE: CPT | Mod: HCNC | Performed by: STUDENT IN AN ORGANIZED HEALTH CARE EDUCATION/TRAINING PROGRAM

## 2025-02-12 PROCEDURE — 99999 PR PBB SHADOW E&M-EST. PATIENT-LVL IV: CPT | Mod: PBBFAC,HCNC,, | Performed by: STUDENT IN AN ORGANIZED HEALTH CARE EDUCATION/TRAINING PROGRAM

## 2025-02-12 NOTE — PROGRESS NOTES
[facility]   Primary Care Visit    DATE   02/12/2025 8:32 AM    REASON FOR VISIT LISTED IN EPIC   Annual Exam (Pt wants to stop digital medicine )    Last Primary Care Visit: Visit date not found    HISTORY OF PRESENTING ILLNESS   Gurwinder Hair, 70 y.o., presents today due to annual check. He has a past medical history of Asthma, CRI (chronic renal insufficiency), Disorder of kidney and ureter, History of bacterial pneumonia, History of solitary pulmonary nodule, Hyperlipidemia, Hypertension, Malignant neoplasm of right upper lobe of lung, Obesity, Other closed fractures of distal end of radius (alone), and Tobacco dependence.    Today, the patient reports     Here for annual check    DIGITAL MEDICINE  He wants to discontinue digital medicine. They're calling him frequently and his measurements are higher when measuring than on his own.    PSORIASIS  Rash has improved, but has a new small spot on his right hand, on the dorsum off his right foot and thigh. Uses a steroid cream PRN but unsure if it actually helps.     COPD  Reports COPD has been pretty good. Can hear himself wheeze at the end of expiration at night. Not new.     No recent illness  Diet: Fair  Exercise: Recently got a bike, goes about 1-2x a week  Sleep: Pretty good.      Current Medications:  Current Outpatient Medications   Medication Sig Note    albuterol (PROAIR HFA) 90 mcg/actuation inhaler INHALE 2 PUFFS (180 MCG) BY INHALATION ROUTE EVERY 4-6 HOURS AS NEEDED     amLODIPine (NORVASC) 10 MG tablet Take 1 tablet (10 mg total) by mouth once daily.     atorvastatin (LIPITOR) 40 MG tablet TAKE 1 TABLET(40 MG) BY MOUTH EVERY EVENING     VANESSA LOW DOSE ASPIRIN ORAL Take by mouth.     clobetasoL (TEMOVATE) 0.05 % cream Apply topically 2 (two) times daily. Prn focal psoriasis.Stop using steroid topical when skin is smooth and non itchy.  Do not treat dark or red coloring.     ipratropium-albuteroL (COMBIVENT)   "mcg/actuation inhaler Inhale 2 puffs into the lungs every 6 (six) hours as needed for Wheezing. Rescue     pantoprazole (PROTONIX) 20 MG tablet TAKE 1 TABLET(20 MG) BY MOUTH EVERY DAY     valsartan (DIOVAN) 320 MG tablet Take 1 tablet (320 mg total) by mouth once daily. Replaces losartan.     ipratropium-albuteroL (COMBIVENT)  mcg/actuation inhaler Inhale 2 puffs into the lungs every 6 (six) hours as needed for Wheezing. Rescue 9/8/2023: Prn     Last reviewed on 2/12/2025  8:28 AM by Maisha Ch MA    Allergies:  Review of patient's allergies indicates:   Allergen Reactions    Codeine Itching    Wellbutrin [bupropion hcl] Rash              SUBJECTIVE   Review of Systems:  Review of Systems   Constitutional: Negative.    HENT: Negative.     Eyes: Negative.    Respiratory: Negative.     Cardiovascular: Negative.    Gastrointestinal: Negative.    Musculoskeletal: Negative.    Skin:  Positive for rash (On right hand, dorum of foot, and thigh).   Allergic/Immunologic: Negative.    Neurological: Negative.    Hematological: Negative.    Psychiatric/Behavioral: Negative.          OBJECTIVE   Vital Signs:  Vitals:    02/12/25 0828   BP: 129/81   BP Location: Right arm   Patient Position: Sitting   Pulse: 71   Temp: 97.9 °F (36.6 °C)   TempSrc: Oral   SpO2: 97%   Weight: 98.1 kg (216 lb 6.1 oz)   Height: 5' 7" (1.702 m)    Body mass index is 33.89 kg/m².    Previous Weight:  Wt Readings from Last 3 Encounters:   02/12/25 98.1 kg (216 lb 6.1 oz)   01/09/25 99.1 kg (218 lb 7.6 oz)   07/26/24 96.7 kg (213 lb 3 oz)        Physical Exam:  Physical Exam  Vitals reviewed.   Constitutional:       Appearance: Normal appearance.   HENT:      Head: Normocephalic.      Right Ear: Tympanic membrane normal.      Left Ear: Tympanic membrane normal.   Cardiovascular:      Rate and Rhythm: Normal rate and regular rhythm.   Pulmonary:      Effort: Pulmonary effort is normal.      Breath sounds: Normal breath sounds.   Abdominal:      " General: Abdomen is flat.      Palpations: Abdomen is soft.   Skin:     Findings: Rash present.   Neurological:      General: No focal deficit present.      Mental Status: He is alert and oriented to person, place, and time.   Psychiatric:         Mood and Affect: Mood normal.         Behavior: Behavior normal.         Laboratory Results:  Lab Results   Component Value Date/Time    HGB 16.5 11/17/2022 10:16 AM    HCT 51.2 11/17/2022 10:16 AM    WBC 6.47 11/17/2022 10:16 AM     11/17/2022 10:16 AM    GLU 82 05/13/2024 08:24 AM    TSH 1.553 05/05/2023 08:08 AM    CHOL 166 07/11/2023 09:00 AM    HDL 34 (L) 07/11/2023 09:00 AM    TRIG 145 07/11/2023 09:00 AM    ALT 13 05/13/2024 08:24 AM    AST 18 05/13/2024 08:24 AM    K 3.3 (L) 05/13/2024 08:24 AM     05/13/2024 08:24 AM     05/13/2024 08:24 AM    BUN 13 05/13/2024 08:24 AM        PAST MEDICAL HISTORY and FAMILY HISTORY   Past medical, surgical, and family history: Reviewed and updated in EMR.     He has a past medical history of Asthma, CRI (chronic renal insufficiency), Disorder of kidney and ureter, History of bacterial pneumonia, History of solitary pulmonary nodule, Hyperlipidemia, Hypertension, Malignant neoplasm of right upper lobe of lung, Obesity, Other closed fractures of distal end of radius (alone), and Tobacco dependence. He has a past surgical history that includes Finger fracture surgery; robotic bronchoscopy (N/A, 10/21/2022); and Colonoscopy (N/A, 4/18/2024). His family history includes No Known Problems in his father and mother.    SOCIAL HISTORY   Reviewed and updated social history in EMR.    He reports that he has been smoking cigarettes. He started smoking about 58 years ago. He has a 52.8 pack-year smoking history. He has never used smokeless tobacco. He reports that he does not currently use alcohol. He reports that he does not use drugs.     HEALTH MAINTENANCE PLANNING     Health Maintenance         Date Due Completion Date     PROSTATE-SPECIFIC ANTIGEN 09/12/2024 9/12/2023    COVID-19 Vaccine (5 - 2024-25 season) 12/19/2024 10/24/2024    Annual UACr 05/10/2025 5/10/2024    High Dose Statin 02/12/2026 2/12/2025    Hemoglobin A1c (Diabetic Prevention Screening) 09/12/2026 9/12/2023    Colorectal Cancer Screening 04/18/2027 4/18/2024    Lipid Panel 07/11/2028 7/11/2023    TETANUS VACCINE 04/17/2034 4/17/2024            Diabetes Screening:  Hemoglobin A1C   Date Value Ref Range Status   09/12/2023 5.3 4.0 - 5.6 % Final     Comment:     ADA Screening Guidelines:  5.7-6.4%  Consistent with prediabetes  >or=6.5%  Consistent with diabetes    High levels of fetal hemoglobin interfere with the HbA1C  assay. Heterozygous hemoglobin variants (HbS, HgC, etc)do  not significantly interfere with this assay.   However, presence of multiple variants may affect accuracy.          Cardiac Risk Screening:  The 10-year ASCVD risk score (Shemar VILCHIS, et al., 2019) is: 31.6%    Values used to calculate the score:      Age: 70 years      Sex: Male      Is Non- : Yes      Diabetic: No      Tobacco smoker: Yes      Systolic Blood Pressure: 129 mmHg      Is BP treated: Yes      HDL Cholesterol: 34 mg/dL      Total Cholesterol: 166 mg/dL    ASSESSMENT and PLAN     Gurwinder Reginaldo should follow up in 6 months for routine care  There are no diagnoses linked to this encounter.     Health maintenance   -- CBC auto differential; future  -- Comprehensive metabolic panel; future  -- Hemoglobin A1c; future   -- TSH; future    Immunizations due  -- COVID-19 (Pfizer) 30 mcg/0.3 mL IM vaccine (>/=13 yo) 0.3 mL    Psoriasis  - Has a new rash on the right hand, dorsum of the right foot and thigh  -- steroid cream PRN    Hypertension  Well controlled  -- cont Valsartan 320 mg tablet once daily  -- cont Amlodipine 10 mg once daily     COPD  Well controlled follows with pulmonology  -- cont. Ipratropium-albuterol  PRN  -- cont. Albuterol 90 mcg/actuation  inhaler      Health Maintenance Discussed  - Patient advised of health maintenance recommendations for age, sex, and personal/social risk factors.   - Patient advised of benefits of receiving a wellness exam with health maintenance lab work and recommended vaccines annually.    Recommended Immunizations Discussed   - Patient advised of the benefits and risks of receiving recommended immunizations for health maintenance.    - Patient advised of the most common side effects of immunizations, including injection site soreness, injection site redness, injection site pain, and tiredness/fatigue for about 48-72 hours.   - Patient advised that severe side effects are very rare, including severe allergic reactions, wheezing, difficulties in breathing or shortness of breath.   - For in clinic administration of vaccine, patient advised that it is recommended that the patient stays in the waiting room for observation for at least 15 minutes to monitor for any reaction(s) to the vaccine.    Patient was counseled today on:  - Diet:  Patient has been advised to follow a diet emphasizing vegetables, fruits, whole grains, low-fat dairy products, fish, legumes and nuts. Patient has been advised to limit sodium, sweets and red meat. Benefit of 15-20 minute post-prandial walk/stroll was discussed.  - Exercise: Patient has been advised that regular physical activity positively impacts health. Patients who are able should engage in mild-to-moderate-intensity aerobic for a minimum of 20-30 minutes 4-5 times a week.    - Smoking and Alcohol:  Harmful effects of smoking, etOH, and recreational drugs discussed. Patient has been advised that counseling with or without drug therapy is available to help quit smoking.  - Sleep Apnea: Patient has been advised that a referral for SLOANE testing is available, and that treatment with positive airway pressure may be beneficial if he is found to have SLOANE.    The above asssessment and plan was discussed  with Dr. Acharya.    --  Ronald Centeno   Medical Student

## 2025-02-14 ENCOUNTER — TELEPHONE (OUTPATIENT)
Dept: PRIMARY CARE CLINIC | Facility: CLINIC | Age: 70
End: 2025-02-14
Payer: MEDICARE

## 2025-02-14 NOTE — TELEPHONE ENCOUNTER
----- Message from Mercedes Acharya MD sent at 2/14/2025  8:25 AM CST -----  Please let patient know his routine labs good and can be repeated as needed.

## 2025-06-14 DIAGNOSIS — E78.49 OTHER HYPERLIPIDEMIA: ICD-10-CM

## 2025-06-14 DIAGNOSIS — I10 ESSENTIAL HYPERTENSION: ICD-10-CM

## 2025-06-14 RX ORDER — ATORVASTATIN CALCIUM 40 MG/1
40 TABLET, FILM COATED ORAL NIGHTLY
Qty: 90 TABLET | Refills: 3 | Status: CANCELLED | OUTPATIENT
Start: 2025-06-14

## 2025-06-14 NOTE — TELEPHONE ENCOUNTER
No care due was identified.  [facility] Embedded Care Due Messages. Reference number: [id number].   6/14/2025 5:57:11 PM CDT

## 2025-06-14 NOTE — TELEPHONE ENCOUNTER
Care Due:                  Date            Visit Type   Department     Provider  --------------------------------------------------------------------------------                                EP -                              PRIMARY      [facility] PRIMARY  Last Visit: 02-      CARE ([facility])   SANTOS Acharya  Next Visit: None Scheduled  None         None Found                                                            Last  Test          Frequency    Reason                     Performed    Due Date  --------------------------------------------------------------------------------    Lipid Panel.  12 months..  atorvastatin.............  07- 07-    Health [facility] Embedded Care Due Messages. Reference number: [id number].   6/14/2025 5:55:45 PM CDT

## 2025-06-17 ENCOUNTER — PATIENT OUTREACH (OUTPATIENT)
Dept: ADMINISTRATIVE | Facility: OTHER | Age: 70
End: 2025-06-17
Payer: MEDICARE

## 2025-06-17 RX ORDER — VALSARTAN 320 MG/1
320 TABLET ORAL DAILY
Qty: 90 TABLET | Refills: 3 | Status: SHIPPED | OUTPATIENT
Start: 2025-06-17

## 2025-06-17 NOTE — PROGRESS NOTES
CHW - Initial Contact    This Community Health Worker reviewed the Social Determinant of Health questionnaire with MRN [id number] over the phone today.    Pt identified barriers of most importance are: None   Referrals to community agencies completed with patient/caregiver consent outside of [facility] include: Yes  Referrals were put through [facility] - No  Support and Services: [employer] program   Other information discussed the patient needs / wants help with: Patient will participate in the food box program through [employer]. Initial form completed for patient, informed someone will contact him regarding next steps.   Follow up required: Yes, general update   No future outreach task assigned

## 2025-06-18 DIAGNOSIS — K21.9 GASTROESOPHAGEAL REFLUX DISEASE WITHOUT ESOPHAGITIS: ICD-10-CM

## 2025-06-18 RX ORDER — PANTOPRAZOLE SODIUM 20 MG/1
20 TABLET, DELAYED RELEASE ORAL
Qty: 90 TABLET | Refills: 2 | Status: SHIPPED | OUTPATIENT
Start: 2025-06-18

## 2025-06-18 NOTE — TELEPHONE ENCOUNTER
Refill Decision Note   Gurwinder Hair  is requesting a refill authorization.  Brief Assessment and Rationale for Refill:  Approve     Medication Therapy Plan:         Comments:     Note composed:8:30 AM 06/18/2025

## 2025-06-18 NOTE — TELEPHONE ENCOUNTER
No care due was identified.  [facility] Embedded Care Due Messages. Reference number: [id number].   6/18/2025 5:56:25 AM CDT

## 2025-06-26 DIAGNOSIS — I10 ESSENTIAL HYPERTENSION: ICD-10-CM

## 2025-06-26 DIAGNOSIS — K21.9 GASTROESOPHAGEAL REFLUX DISEASE WITHOUT ESOPHAGITIS: ICD-10-CM

## 2025-06-26 DIAGNOSIS — E78.49 OTHER HYPERLIPIDEMIA: ICD-10-CM

## 2025-06-27 RX ORDER — AMLODIPINE BESYLATE 10 MG/1
10 TABLET ORAL DAILY
Qty: 90 TABLET | Refills: 3 | Status: SHIPPED | OUTPATIENT
Start: 2025-06-27

## 2025-06-27 RX ORDER — ATORVASTATIN CALCIUM 40 MG/1
40 TABLET, FILM COATED ORAL NIGHTLY
Qty: 90 TABLET | Refills: 3 | Status: SHIPPED | OUTPATIENT
Start: 2025-06-27

## 2025-06-27 RX ORDER — VALSARTAN 320 MG/1
320 TABLET ORAL DAILY
Qty: 90 TABLET | Refills: 3 | Status: SHIPPED | OUTPATIENT
Start: 2025-06-27

## 2025-06-27 RX ORDER — PANTOPRAZOLE SODIUM 20 MG/1
20 TABLET, DELAYED RELEASE ORAL DAILY
Qty: 90 TABLET | Refills: 2 | Status: SHIPPED | OUTPATIENT
Start: 2025-06-27

## 2025-07-29 ENCOUNTER — PATIENT OUTREACH (OUTPATIENT)
Dept: ADMINISTRATIVE | Facility: OTHER | Age: 70
End: 2025-07-29
Payer: MEDICARE

## 2025-07-29 NOTE — PROGRESS NOTES
CHW - Case Closure    This Community Health Worker spoke to VICKY Johnson over the phone today.   Pt/Caregiver reported: No updates reported. Patient informed someone from the [employer] will contact him regarding next steps, no additional assistance required from CHW.   Pt/Caregiver denied any additional needs at this time and agrees with episode closure at this time.  Provided VICKY Johnson with Community Health Worker's contact information and encouraged him/her to contact this Community Health Worker if additional needs arise.

## (undated) DEVICE — GOWN POLY REINF BRTH SLV XL

## (undated) DEVICE — SYR SLIP TIP 20CC

## (undated) DEVICE — LUBRICANT SURGILUBE 2 OZ

## (undated) DEVICE — NDL FLEXISION BIOPSY 21G

## (undated) DEVICE — CATH BRONCHOSCOPE F/BF

## (undated) DEVICE — PENCIL GOLF STERILE

## (undated) DEVICE — NDL ASPIRATING VIZISHOT 20-40M

## (undated) DEVICE — CYTOSPIN COLLECTION FLUID BLT

## (undated) DEVICE — CONTAINER SPECIMEN STRL 4OZ

## (undated) DEVICE — PACK SET UP CONVERTORS

## (undated) DEVICE — SYR 10CC LUER LOCK

## (undated) DEVICE — ALCOHOL BLEND 95%

## (undated) DEVICE — NDL VIZISHOT 2 FLEX 22G

## (undated) DEVICE — BOWL STERILE LARGE 32OZ

## (undated) DEVICE — ADAPTER SWIVEL

## (undated) DEVICE — DRESSING TRANS 6X8 TEGADERM

## (undated) DEVICE — GAUZE SPONGE 4X4 12PLY

## (undated) DEVICE — DRAPE THREE-QTR REINF 53X77IN

## (undated) DEVICE — SYS LABEL CORRECT MED